# Patient Record
Sex: FEMALE | Race: WHITE | Employment: UNEMPLOYED | URBAN - METROPOLITAN AREA
[De-identification: names, ages, dates, MRNs, and addresses within clinical notes are randomized per-mention and may not be internally consistent; named-entity substitution may affect disease eponyms.]

---

## 2020-01-01 ENCOUNTER — APPOINTMENT (OUTPATIENT)
Dept: GENERAL RADIOLOGY | Age: 70
DRG: 480 | End: 2020-01-01
Attending: ORTHOPAEDIC SURGERY
Payer: MEDICARE

## 2020-01-01 ENCOUNTER — APPOINTMENT (OUTPATIENT)
Dept: GENERAL RADIOLOGY | Age: 70
DRG: 480 | End: 2020-01-01
Attending: INTERNAL MEDICINE
Payer: MEDICARE

## 2020-01-01 ENCOUNTER — APPOINTMENT (OUTPATIENT)
Dept: GENERAL RADIOLOGY | Age: 70
DRG: 480 | End: 2020-01-01
Attending: EMERGENCY MEDICINE
Payer: MEDICARE

## 2020-01-01 ENCOUNTER — APPOINTMENT (OUTPATIENT)
Dept: MRI IMAGING | Age: 70
DRG: 480 | End: 2020-01-01
Attending: INTERNAL MEDICINE
Payer: MEDICARE

## 2020-01-01 ENCOUNTER — ANESTHESIA EVENT (OUTPATIENT)
Dept: SURGERY | Age: 70
DRG: 480 | End: 2020-01-01
Payer: MEDICARE

## 2020-01-01 ENCOUNTER — APPOINTMENT (OUTPATIENT)
Dept: NON INVASIVE DIAGNOSTICS | Age: 70
DRG: 480 | End: 2020-01-01
Attending: HOSPITALIST
Payer: MEDICARE

## 2020-01-01 ENCOUNTER — APPOINTMENT (OUTPATIENT)
Dept: ULTRASOUND IMAGING | Age: 70
DRG: 480 | End: 2020-01-01
Attending: NURSE PRACTITIONER
Payer: MEDICARE

## 2020-01-01 ENCOUNTER — APPOINTMENT (OUTPATIENT)
Dept: ULTRASOUND IMAGING | Age: 70
DRG: 480 | End: 2020-01-01
Attending: INTERNAL MEDICINE
Payer: MEDICARE

## 2020-01-01 ENCOUNTER — HOSPITAL ENCOUNTER (INPATIENT)
Age: 70
LOS: 15 days | DRG: 480 | End: 2020-05-18
Attending: EMERGENCY MEDICINE | Admitting: HOSPITALIST
Payer: MEDICARE

## 2020-01-01 ENCOUNTER — ANESTHESIA (OUTPATIENT)
Dept: SURGERY | Age: 70
DRG: 480 | End: 2020-01-01
Payer: MEDICARE

## 2020-01-01 ENCOUNTER — APPOINTMENT (OUTPATIENT)
Dept: CT IMAGING | Age: 70
DRG: 480 | End: 2020-01-01
Attending: EMERGENCY MEDICINE
Payer: MEDICARE

## 2020-01-01 VITALS
TEMPERATURE: 97 F | WEIGHT: 149.4 LBS | DIASTOLIC BLOOD PRESSURE: 36 MMHG | HEART RATE: 56 BPM | RESPIRATION RATE: 16 BRPM | SYSTOLIC BLOOD PRESSURE: 55 MMHG | OXYGEN SATURATION: 96 % | BODY MASS INDEX: 26.47 KG/M2 | HEIGHT: 63 IN

## 2020-01-01 DIAGNOSIS — S72.001A CLOSED FRACTURE OF RIGHT HIP, INITIAL ENCOUNTER (HCC): Primary | ICD-10-CM

## 2020-01-01 DIAGNOSIS — R50.9 FEVER, UNSPECIFIED FEVER CAUSE: ICD-10-CM

## 2020-01-01 LAB
ABO + RH BLD: NORMAL
ACTIN IGG SERPL-ACNC: 6 UNITS (ref 0–19)
ALBUMIN SERPL-MCNC: 2.5 G/DL (ref 3.5–5)
ALBUMIN SERPL-MCNC: 2.6 G/DL (ref 3.5–5)
ALBUMIN SERPL-MCNC: 2.7 G/DL (ref 3.5–5)
ALBUMIN SERPL-MCNC: 2.7 G/DL (ref 3.5–5)
ALBUMIN SERPL-MCNC: 2.8 G/DL (ref 3.5–5)
ALBUMIN SERPL-MCNC: 2.9 G/DL (ref 3.5–5)
ALBUMIN SERPL-MCNC: 3 G/DL (ref 3.5–5)
ALBUMIN SERPL-MCNC: 3.4 G/DL (ref 3.5–5)
ALBUMIN SERPL-MCNC: 3.4 G/DL (ref 3.5–5)
ALBUMIN/GLOB SERPL: 0.7 {RATIO} (ref 1.1–2.2)
ALBUMIN/GLOB SERPL: 0.8 {RATIO} (ref 1.1–2.2)
ALBUMIN/GLOB SERPL: 0.9 {RATIO} (ref 1.1–2.2)
ALBUMIN/GLOB SERPL: 0.9 {RATIO} (ref 1.1–2.2)
ALP BONE CFR SERPL: 42 % (ref 14–68)
ALP BONE SERPL-MCNC: 114.9 UG/L
ALP INTEST CFR SERPL: 2 % (ref 0–18)
ALP LIVER CFR SERPL: 56 % (ref 18–85)
ALP SERPL-CCNC: 113 U/L (ref 45–117)
ALP SERPL-CCNC: 135 U/L (ref 45–117)
ALP SERPL-CCNC: 179 U/L (ref 45–117)
ALP SERPL-CCNC: 460 IU/L (ref 39–117)
ALP SERPL-CCNC: 466 U/L (ref 45–117)
ALP SERPL-CCNC: 480 U/L (ref 45–117)
ALP SERPL-CCNC: 508 U/L (ref 45–117)
ALP SERPL-CCNC: 569 U/L (ref 45–117)
ALP SERPL-CCNC: 620 U/L (ref 45–117)
ALP SERPL-CCNC: 622 U/L (ref 45–117)
ALT SERPL-CCNC: 104 U/L (ref 12–78)
ALT SERPL-CCNC: 106 U/L (ref 12–78)
ALT SERPL-CCNC: 111 U/L (ref 12–78)
ALT SERPL-CCNC: 131 U/L (ref 12–78)
ALT SERPL-CCNC: 28 U/L (ref 12–78)
ALT SERPL-CCNC: 36 U/L (ref 12–78)
ALT SERPL-CCNC: 37 U/L (ref 12–78)
ALT SERPL-CCNC: 74 U/L (ref 12–78)
ALT SERPL-CCNC: 78 U/L (ref 12–78)
ANION GAP SERPL CALC-SCNC: 11 MMOL/L (ref 5–15)
ANION GAP SERPL CALC-SCNC: 12 MMOL/L (ref 5–15)
ANION GAP SERPL CALC-SCNC: 4 MMOL/L (ref 5–15)
ANION GAP SERPL CALC-SCNC: 5 MMOL/L (ref 5–15)
ANION GAP SERPL CALC-SCNC: 6 MMOL/L (ref 5–15)
ANION GAP SERPL CALC-SCNC: 7 MMOL/L (ref 5–15)
ANION GAP SERPL CALC-SCNC: 7 MMOL/L (ref 5–15)
ANION GAP SERPL CALC-SCNC: 8 MMOL/L (ref 5–15)
ANION GAP SERPL CALC-SCNC: 9 MMOL/L (ref 5–15)
APPEARANCE UR: ABNORMAL
AST SERPL-CCNC: 17 U/L (ref 15–37)
AST SERPL-CCNC: 17 U/L (ref 15–37)
AST SERPL-CCNC: 20 U/L (ref 15–37)
AST SERPL-CCNC: 33 U/L (ref 15–37)
AST SERPL-CCNC: 38 U/L (ref 15–37)
AST SERPL-CCNC: 43 U/L (ref 15–37)
AST SERPL-CCNC: 61 U/L (ref 15–37)
AST SERPL-CCNC: 63 U/L (ref 15–37)
AST SERPL-CCNC: 71 U/L (ref 15–37)
ATRIAL RATE: 55 BPM
ATRIAL RATE: 91 BPM
AV VELOCITY RATIO: 0.7
B PERT DNA SPEC QL NAA+PROBE: NOT DETECTED
BACTERIA SPEC CULT: ABNORMAL
BACTERIA SPEC CULT: NORMAL
BACTERIA URNS QL MICRO: ABNORMAL /HPF
BASOPHILS # BLD: 0 K/UL (ref 0–0.1)
BASOPHILS # BLD: 0.1 K/UL (ref 0–0.1)
BASOPHILS NFR BLD: 0 % (ref 0–1)
BASOPHILS NFR BLD: 1 % (ref 0–1)
BILIRUB SERPL-MCNC: 0.3 MG/DL (ref 0.2–1)
BILIRUB SERPL-MCNC: 0.4 MG/DL (ref 0.2–1)
BILIRUB SERPL-MCNC: 0.4 MG/DL (ref 0.2–1)
BILIRUB SERPL-MCNC: 0.5 MG/DL (ref 0.2–1)
BILIRUB SERPL-MCNC: 0.7 MG/DL (ref 0.2–1)
BILIRUB SERPL-MCNC: 0.7 MG/DL (ref 0.2–1)
BILIRUB SERPL-MCNC: 0.8 MG/DL (ref 0.2–1)
BILIRUB UR QL: NEGATIVE
BLOOD GROUP ANTIBODIES SERPL: NORMAL
BNP SERPL-MCNC: 4772 PG/ML
BNP SERPL-MCNC: 5422 PG/ML
BORDETELLA PARAPERTUSSIS PCR, BORPAR: NOT DETECTED
BUN SERPL-MCNC: 13 MG/DL (ref 6–20)
BUN SERPL-MCNC: 15 MG/DL (ref 6–20)
BUN SERPL-MCNC: 16 MG/DL (ref 6–20)
BUN SERPL-MCNC: 17 MG/DL (ref 6–20)
BUN SERPL-MCNC: 22 MG/DL (ref 6–20)
BUN SERPL-MCNC: 25 MG/DL (ref 6–20)
BUN SERPL-MCNC: 26 MG/DL (ref 6–20)
BUN SERPL-MCNC: 27 MG/DL (ref 6–20)
BUN SERPL-MCNC: 27 MG/DL (ref 6–20)
BUN SERPL-MCNC: 28 MG/DL (ref 6–20)
BUN SERPL-MCNC: 29 MG/DL (ref 6–20)
BUN/CREAT SERPL: 16 (ref 12–20)
BUN/CREAT SERPL: 19 (ref 12–20)
BUN/CREAT SERPL: 20 (ref 12–20)
BUN/CREAT SERPL: 25 (ref 12–20)
BUN/CREAT SERPL: 27 (ref 12–20)
BUN/CREAT SERPL: 29 (ref 12–20)
BUN/CREAT SERPL: 33 (ref 12–20)
BUN/CREAT SERPL: 35 (ref 12–20)
BUN/CREAT SERPL: 36 (ref 12–20)
BUN/CREAT SERPL: 37 (ref 12–20)
BUN/CREAT SERPL: 41 (ref 12–20)
C PNEUM DNA SPEC QL NAA+PROBE: NOT DETECTED
CALCIUM SERPL-MCNC: 8.3 MG/DL (ref 8.5–10.1)
CALCIUM SERPL-MCNC: 8.6 MG/DL (ref 8.5–10.1)
CALCIUM SERPL-MCNC: 8.7 MG/DL (ref 8.5–10.1)
CALCIUM SERPL-MCNC: 8.7 MG/DL (ref 8.5–10.1)
CALCIUM SERPL-MCNC: 8.8 MG/DL (ref 8.5–10.1)
CALCIUM SERPL-MCNC: 8.9 MG/DL (ref 8.5–10.1)
CALCIUM SERPL-MCNC: 9 MG/DL (ref 8.5–10.1)
CALCIUM SERPL-MCNC: 9.1 MG/DL (ref 8.5–10.1)
CALCIUM SERPL-MCNC: 9.1 MG/DL (ref 8.5–10.1)
CALCIUM SERPL-MCNC: 9.2 MG/DL (ref 8.5–10.1)
CALCIUM SERPL-MCNC: 9.6 MG/DL (ref 8.5–10.1)
CALCULATED P AXIS, ECG09: 73 DEGREES
CALCULATED R AXIS, ECG10: -26 DEGREES
CALCULATED R AXIS, ECG10: -52 DEGREES
CALCULATED T AXIS, ECG11: 114 DEGREES
CALCULATED T AXIS, ECG11: 87 DEGREES
CC UR VC: ABNORMAL
CHLORIDE SERPL-SCNC: 106 MMOL/L (ref 97–108)
CHLORIDE SERPL-SCNC: 107 MMOL/L (ref 97–108)
CHLORIDE SERPL-SCNC: 108 MMOL/L (ref 97–108)
CHLORIDE SERPL-SCNC: 108 MMOL/L (ref 97–108)
CHLORIDE SERPL-SCNC: 110 MMOL/L (ref 97–108)
CHLORIDE SERPL-SCNC: 110 MMOL/L (ref 97–108)
CHLORIDE SERPL-SCNC: 111 MMOL/L (ref 97–108)
CHLORIDE SERPL-SCNC: 113 MMOL/L (ref 97–108)
CHLORIDE SERPL-SCNC: 114 MMOL/L (ref 97–108)
CHLORIDE SERPL-SCNC: 115 MMOL/L (ref 97–108)
CHOLEST SERPL-MCNC: 160 MG/DL
CK MB CFR SERPL CALC: 1.7 % (ref 0–2.5)
CK MB SERPL-MCNC: 3.2 NG/ML (ref 5–25)
CK SERPL-CCNC: 186 U/L (ref 26–192)
CO2 SERPL-SCNC: 18 MMOL/L (ref 21–32)
CO2 SERPL-SCNC: 18 MMOL/L (ref 21–32)
CO2 SERPL-SCNC: 23 MMOL/L (ref 21–32)
CO2 SERPL-SCNC: 24 MMOL/L (ref 21–32)
CO2 SERPL-SCNC: 25 MMOL/L (ref 21–32)
CO2 SERPL-SCNC: 26 MMOL/L (ref 21–32)
CO2 SERPL-SCNC: 28 MMOL/L (ref 21–32)
COLOR UR: ABNORMAL
COMMENT, HOLDF: NORMAL
CORTIS SERPL-MCNC: 27.8 UG/DL
CORTIS SERPL-MCNC: 29.1 UG/DL
CREAT SERPL-MCNC: 0.61 MG/DL (ref 0.55–1.02)
CREAT SERPL-MCNC: 0.63 MG/DL (ref 0.55–1.02)
CREAT SERPL-MCNC: 0.67 MG/DL (ref 0.55–1.02)
CREAT SERPL-MCNC: 0.71 MG/DL (ref 0.55–1.02)
CREAT SERPL-MCNC: 0.75 MG/DL (ref 0.55–1.02)
CREAT SERPL-MCNC: 0.75 MG/DL (ref 0.55–1.02)
CREAT SERPL-MCNC: 0.76 MG/DL (ref 0.55–1.02)
CREAT SERPL-MCNC: 0.77 MG/DL (ref 0.55–1.02)
CREAT SERPL-MCNC: 0.8 MG/DL (ref 0.55–1.02)
CREAT SERPL-MCNC: 0.82 MG/DL (ref 0.55–1.02)
CREAT SERPL-MCNC: 0.83 MG/DL (ref 0.55–1.02)
CREAT SERPL-MCNC: 0.86 MG/DL (ref 0.55–1.02)
CREAT SERPL-MCNC: 0.93 MG/DL (ref 0.55–1.02)
CRP SERPL-MCNC: 5.49 MG/DL (ref 0–0.6)
D DIMER PPP FEU-MCNC: 30.85 MG/L FEU (ref 0–0.65)
D DIMER PPP FEU-MCNC: 9.08 MG/L FEU (ref 0–0.65)
DIAGNOSIS, 93000: NORMAL
DIAGNOSIS, 93000: NORMAL
DIFFERENTIAL METHOD BLD: ABNORMAL
ECHO AO ROOT DIAM: 2.93 CM
ECHO AV AREA PEAK VELOCITY: 2.3 CM2
ECHO AV AREA/BSA PEAK VELOCITY: 1.4 CM2/M2
ECHO AV PEAK GRADIENT: 7.3 MMHG
ECHO AV PEAK VELOCITY: 134.93 CM/S
ECHO LA AREA 4C: 25.9 CM2
ECHO LA MAJOR AXIS: 3.49 CM
ECHO LA TO AORTIC ROOT RATIO: 1.19
ECHO LA VOL 2C: 85.15 ML (ref 22–52)
ECHO LA VOL 4C: 78.48 ML (ref 22–52)
ECHO LA VOL BP: 91.85 ML (ref 22–52)
ECHO LA VOL/BSA BIPLANE: 56.19 ML/M2 (ref 16–28)
ECHO LA VOLUME INDEX A2C: 52.09 ML/M2 (ref 16–28)
ECHO LA VOLUME INDEX A4C: 48.01 ML/M2 (ref 16–28)
ECHO LV E' LATERAL VELOCITY: 8.54 CM/S
ECHO LV E' SEPTAL VELOCITY: 7.86 CM/S
ECHO LV EDV TEICHHOLZ: 0.37 ML
ECHO LV ESV TEICHHOLZ: 0.12 ML
ECHO LV INTERNAL DIMENSION DIASTOLIC: 3.72 CM (ref 3.9–5.3)
ECHO LV INTERNAL DIMENSION SYSTOLIC: 2.39 CM
ECHO LV IVSD: 1.46 CM (ref 0.6–0.9)
ECHO LV IVSS: 2 CM
ECHO LV MASS 2D: 269.2 G (ref 67–162)
ECHO LV MASS INDEX 2D: 164.7 G/M2 (ref 43–95)
ECHO LV POSTERIOR WALL DIASTOLIC: 1.69 CM (ref 0.6–0.9)
ECHO LV POSTERIOR WALL SYSTOLIC: 2.01 CM
ECHO LVOT DIAM: 2.06 CM
ECHO LVOT PEAK GRADIENT: 3.5 MMHG
ECHO LVOT PEAK VELOCITY: 93.92 CM/S
ECHO MV AREA PHT: 4.3 CM2
ECHO MV E DECELERATION TIME (DT): 206.6 MS
ECHO MV E VELOCITY: 157.87 CM/S
ECHO MV E/E' LATERAL: 18.49
ECHO MV E/E' RATIO (AVERAGED): 19.29
ECHO MV E/E' SEPTAL: 20.09
ECHO MV PRESSURE HALF TIME (PHT): 50.9 MS
ECHO MV REGURGITANT PEAK GRADIENT: 138.7 MMHG
ECHO MV REGURGITANT PEAK VELOCITY: 588.9 CM/S
ECHO PV MAX VELOCITY: 127.76 CM/S
ECHO PV PEAK GRADIENT: 6.5 MMHG
ECHO RV INTERNAL DIMENSION: 4.38 CM
ECHO TV REGURGITANT MAX VELOCITY: 234.96 CM/S
ECHO TV REGURGITANT PEAK GRADIENT: 22.1 MMHG
EOSINOPHIL # BLD: 0 K/UL (ref 0–0.4)
EOSINOPHIL # BLD: 0.1 K/UL (ref 0–0.4)
EOSINOPHIL # BLD: 0.1 K/UL (ref 0–0.4)
EOSINOPHIL # BLD: 0.2 K/UL (ref 0–0.4)
EOSINOPHIL NFR BLD: 0 % (ref 0–7)
EOSINOPHIL NFR BLD: 1 % (ref 0–7)
EPITH CASTS URNS QL MICRO: ABNORMAL /LPF
ERYTHROCYTE [DISTWIDTH] IN BLOOD BY AUTOMATED COUNT: 14.4 % (ref 11.5–14.5)
ERYTHROCYTE [DISTWIDTH] IN BLOOD BY AUTOMATED COUNT: 14.6 % (ref 11.5–14.5)
ERYTHROCYTE [DISTWIDTH] IN BLOOD BY AUTOMATED COUNT: 15.3 % (ref 11.5–14.5)
ERYTHROCYTE [DISTWIDTH] IN BLOOD BY AUTOMATED COUNT: 16.2 % (ref 11.5–14.5)
ERYTHROCYTE [DISTWIDTH] IN BLOOD BY AUTOMATED COUNT: 16.5 % (ref 11.5–14.5)
ERYTHROCYTE [DISTWIDTH] IN BLOOD BY AUTOMATED COUNT: 17 % (ref 11.5–14.5)
ERYTHROCYTE [DISTWIDTH] IN BLOOD BY AUTOMATED COUNT: 17.3 % (ref 11.5–14.5)
ERYTHROCYTE [DISTWIDTH] IN BLOOD BY AUTOMATED COUNT: 17.3 % (ref 11.5–14.5)
ERYTHROCYTE [DISTWIDTH] IN BLOOD BY AUTOMATED COUNT: 17.5 % (ref 11.5–14.5)
EST. AVERAGE GLUCOSE BLD GHB EST-MCNC: 212 MG/DL
FERRITIN SERPL-MCNC: 115 NG/ML (ref 26–388)
FERRITIN SERPL-MCNC: 141 NG/ML (ref 8–252)
FLUAV H1 2009 PAND RNA SPEC QL NAA+PROBE: NOT DETECTED
FLUAV H1 RNA SPEC QL NAA+PROBE: NOT DETECTED
FLUAV H3 RNA SPEC QL NAA+PROBE: NOT DETECTED
FLUAV SUBTYP SPEC NAA+PROBE: NOT DETECTED
FLUBV RNA SPEC QL NAA+PROBE: NOT DETECTED
FLUID CULTURE, SPNG2: NORMAL
GGT SERPL-CCNC: 412 U/L (ref 5–55)
GLOBULIN SER CALC-MCNC: 3.4 G/DL (ref 2–4)
GLOBULIN SER CALC-MCNC: 3.4 G/DL (ref 2–4)
GLOBULIN SER CALC-MCNC: 3.6 G/DL (ref 2–4)
GLOBULIN SER CALC-MCNC: 3.6 G/DL (ref 2–4)
GLOBULIN SER CALC-MCNC: 3.8 G/DL (ref 2–4)
GLOBULIN SER CALC-MCNC: 3.9 G/DL (ref 2–4)
GLOBULIN SER CALC-MCNC: 4.3 G/DL (ref 2–4)
GLUCOSE BLD STRIP.AUTO-MCNC: 111 MG/DL (ref 65–100)
GLUCOSE BLD STRIP.AUTO-MCNC: 113 MG/DL (ref 65–100)
GLUCOSE BLD STRIP.AUTO-MCNC: 116 MG/DL (ref 65–100)
GLUCOSE BLD STRIP.AUTO-MCNC: 119 MG/DL (ref 65–100)
GLUCOSE BLD STRIP.AUTO-MCNC: 121 MG/DL (ref 65–100)
GLUCOSE BLD STRIP.AUTO-MCNC: 125 MG/DL (ref 65–100)
GLUCOSE BLD STRIP.AUTO-MCNC: 125 MG/DL (ref 65–100)
GLUCOSE BLD STRIP.AUTO-MCNC: 126 MG/DL (ref 65–100)
GLUCOSE BLD STRIP.AUTO-MCNC: 133 MG/DL (ref 65–100)
GLUCOSE BLD STRIP.AUTO-MCNC: 133 MG/DL (ref 65–100)
GLUCOSE BLD STRIP.AUTO-MCNC: 134 MG/DL (ref 65–100)
GLUCOSE BLD STRIP.AUTO-MCNC: 143 MG/DL (ref 65–100)
GLUCOSE BLD STRIP.AUTO-MCNC: 148 MG/DL (ref 65–100)
GLUCOSE BLD STRIP.AUTO-MCNC: 148 MG/DL (ref 65–100)
GLUCOSE BLD STRIP.AUTO-MCNC: 175 MG/DL (ref 65–100)
GLUCOSE BLD STRIP.AUTO-MCNC: 180 MG/DL (ref 65–100)
GLUCOSE BLD STRIP.AUTO-MCNC: 187 MG/DL (ref 65–100)
GLUCOSE BLD STRIP.AUTO-MCNC: 191 MG/DL (ref 65–100)
GLUCOSE BLD STRIP.AUTO-MCNC: 199 MG/DL (ref 65–100)
GLUCOSE BLD STRIP.AUTO-MCNC: 201 MG/DL (ref 65–100)
GLUCOSE BLD STRIP.AUTO-MCNC: 202 MG/DL (ref 65–100)
GLUCOSE BLD STRIP.AUTO-MCNC: 208 MG/DL (ref 65–100)
GLUCOSE BLD STRIP.AUTO-MCNC: 209 MG/DL (ref 65–100)
GLUCOSE BLD STRIP.AUTO-MCNC: 210 MG/DL (ref 65–100)
GLUCOSE BLD STRIP.AUTO-MCNC: 211 MG/DL (ref 65–100)
GLUCOSE BLD STRIP.AUTO-MCNC: 212 MG/DL (ref 65–100)
GLUCOSE BLD STRIP.AUTO-MCNC: 214 MG/DL (ref 65–100)
GLUCOSE BLD STRIP.AUTO-MCNC: 214 MG/DL (ref 65–100)
GLUCOSE BLD STRIP.AUTO-MCNC: 216 MG/DL (ref 65–100)
GLUCOSE BLD STRIP.AUTO-MCNC: 217 MG/DL (ref 65–100)
GLUCOSE BLD STRIP.AUTO-MCNC: 223 MG/DL (ref 65–100)
GLUCOSE BLD STRIP.AUTO-MCNC: 224 MG/DL (ref 65–100)
GLUCOSE BLD STRIP.AUTO-MCNC: 227 MG/DL (ref 65–100)
GLUCOSE BLD STRIP.AUTO-MCNC: 229 MG/DL (ref 65–100)
GLUCOSE BLD STRIP.AUTO-MCNC: 229 MG/DL (ref 65–100)
GLUCOSE BLD STRIP.AUTO-MCNC: 230 MG/DL (ref 65–100)
GLUCOSE BLD STRIP.AUTO-MCNC: 230 MG/DL (ref 65–100)
GLUCOSE BLD STRIP.AUTO-MCNC: 233 MG/DL (ref 65–100)
GLUCOSE BLD STRIP.AUTO-MCNC: 235 MG/DL (ref 65–100)
GLUCOSE BLD STRIP.AUTO-MCNC: 239 MG/DL (ref 65–100)
GLUCOSE BLD STRIP.AUTO-MCNC: 241 MG/DL (ref 65–100)
GLUCOSE BLD STRIP.AUTO-MCNC: 244 MG/DL (ref 65–100)
GLUCOSE BLD STRIP.AUTO-MCNC: 248 MG/DL (ref 65–100)
GLUCOSE BLD STRIP.AUTO-MCNC: 250 MG/DL (ref 65–100)
GLUCOSE BLD STRIP.AUTO-MCNC: 253 MG/DL (ref 65–100)
GLUCOSE BLD STRIP.AUTO-MCNC: 260 MG/DL (ref 65–100)
GLUCOSE BLD STRIP.AUTO-MCNC: 264 MG/DL (ref 65–100)
GLUCOSE BLD STRIP.AUTO-MCNC: 266 MG/DL (ref 65–100)
GLUCOSE BLD STRIP.AUTO-MCNC: 267 MG/DL (ref 65–100)
GLUCOSE BLD STRIP.AUTO-MCNC: 269 MG/DL (ref 65–100)
GLUCOSE BLD STRIP.AUTO-MCNC: 271 MG/DL (ref 65–100)
GLUCOSE BLD STRIP.AUTO-MCNC: 273 MG/DL (ref 65–100)
GLUCOSE BLD STRIP.AUTO-MCNC: 276 MG/DL (ref 65–100)
GLUCOSE BLD STRIP.AUTO-MCNC: 285 MG/DL (ref 65–100)
GLUCOSE BLD STRIP.AUTO-MCNC: 288 MG/DL (ref 65–100)
GLUCOSE BLD STRIP.AUTO-MCNC: 288 MG/DL (ref 65–100)
GLUCOSE BLD STRIP.AUTO-MCNC: 300 MG/DL (ref 65–100)
GLUCOSE BLD STRIP.AUTO-MCNC: 306 MG/DL (ref 65–100)
GLUCOSE BLD STRIP.AUTO-MCNC: 311 MG/DL (ref 65–100)
GLUCOSE BLD STRIP.AUTO-MCNC: 322 MG/DL (ref 65–100)
GLUCOSE BLD STRIP.AUTO-MCNC: 327 MG/DL (ref 65–100)
GLUCOSE BLD STRIP.AUTO-MCNC: 363 MG/DL (ref 65–100)
GLUCOSE BLD STRIP.AUTO-MCNC: 368 MG/DL (ref 65–100)
GLUCOSE BLD STRIP.AUTO-MCNC: 385 MG/DL (ref 65–100)
GLUCOSE BLD STRIP.AUTO-MCNC: 395 MG/DL (ref 65–100)
GLUCOSE BLD STRIP.AUTO-MCNC: 59 MG/DL (ref 65–100)
GLUCOSE BLD STRIP.AUTO-MCNC: 64 MG/DL (ref 65–100)
GLUCOSE BLD STRIP.AUTO-MCNC: 84 MG/DL (ref 65–100)
GLUCOSE SERPL-MCNC: 100 MG/DL (ref 65–100)
GLUCOSE SERPL-MCNC: 121 MG/DL (ref 65–100)
GLUCOSE SERPL-MCNC: 126 MG/DL (ref 65–100)
GLUCOSE SERPL-MCNC: 200 MG/DL (ref 65–100)
GLUCOSE SERPL-MCNC: 204 MG/DL (ref 65–100)
GLUCOSE SERPL-MCNC: 217 MG/DL (ref 65–100)
GLUCOSE SERPL-MCNC: 220 MG/DL (ref 65–100)
GLUCOSE SERPL-MCNC: 226 MG/DL (ref 65–100)
GLUCOSE SERPL-MCNC: 237 MG/DL (ref 65–100)
GLUCOSE SERPL-MCNC: 241 MG/DL (ref 65–100)
GLUCOSE SERPL-MCNC: 52 MG/DL (ref 65–100)
GLUCOSE SERPL-MCNC: 66 MG/DL (ref 65–100)
GLUCOSE SERPL-MCNC: 92 MG/DL (ref 65–100)
GLUCOSE UR STRIP.AUTO-MCNC: 100 MG/DL
HADV DNA SPEC QL NAA+PROBE: NOT DETECTED
HAV IGM SER QL: NONREACTIVE
HBA1C MFR BLD: 9 % (ref 4–5.6)
HBV CORE IGM SER QL: NONREACTIVE
HBV SURFACE AG SER QL: <0.1 INDEX
HBV SURFACE AG SER QL: NEGATIVE
HCOV 229E RNA SPEC QL NAA+PROBE: NOT DETECTED
HCOV HKU1 RNA SPEC QL NAA+PROBE: NOT DETECTED
HCOV NL63 RNA SPEC QL NAA+PROBE: NOT DETECTED
HCOV OC43 RNA SPEC QL NAA+PROBE: NOT DETECTED
HCT VFR BLD AUTO: 24.5 % (ref 35–47)
HCT VFR BLD AUTO: 24.7 % (ref 35–47)
HCT VFR BLD AUTO: 26.1 % (ref 35–47)
HCT VFR BLD AUTO: 26.4 % (ref 35–47)
HCT VFR BLD AUTO: 26.7 % (ref 35–47)
HCT VFR BLD AUTO: 27 % (ref 35–47)
HCT VFR BLD AUTO: 27.2 % (ref 35–47)
HCT VFR BLD AUTO: 28.9 % (ref 35–47)
HCT VFR BLD AUTO: 29.4 % (ref 35–47)
HCV AB SERPL QL IA: NONREACTIVE
HCV COMMENT,HCGAC: NORMAL
HDLC SERPL-MCNC: 54 MG/DL
HDLC SERPL: 3 {RATIO} (ref 0–5)
HGB BLD-MCNC: 7.7 G/DL (ref 11.5–16)
HGB BLD-MCNC: 7.9 G/DL (ref 11.5–16)
HGB BLD-MCNC: 8 G/DL (ref 11.5–16)
HGB BLD-MCNC: 8.7 G/DL (ref 11.5–16)
HGB BLD-MCNC: 8.8 G/DL (ref 11.5–16)
HGB BLD-MCNC: 8.8 G/DL (ref 11.5–16)
HGB BLD-MCNC: 9.2 G/DL (ref 11.5–16)
HGB BLD-MCNC: 9.8 G/DL (ref 11.5–16)
HGB UR QL STRIP: ABNORMAL
HMPV RNA SPEC QL NAA+PROBE: NOT DETECTED
HPIV1 RNA SPEC QL NAA+PROBE: NOT DETECTED
HPIV2 RNA SPEC QL NAA+PROBE: NOT DETECTED
HPIV3 RNA SPEC QL NAA+PROBE: NOT DETECTED
HPIV4 RNA SPEC QL NAA+PROBE: NOT DETECTED
HYALINE CASTS URNS QL MICRO: ABNORMAL /LPF (ref 0–5)
IL6 SERPL-MCNC: 25.7 PG/ML (ref 0–15.5)
IMM GRANULOCYTES # BLD AUTO: 0 K/UL (ref 0–0.04)
IMM GRANULOCYTES # BLD AUTO: 0 K/UL (ref 0–0.04)
IMM GRANULOCYTES # BLD AUTO: 0.1 K/UL (ref 0–0.04)
IMM GRANULOCYTES # BLD AUTO: 0.2 K/UL (ref 0–0.04)
IMM GRANULOCYTES NFR BLD AUTO: 0 % (ref 0–0.5)
IMM GRANULOCYTES NFR BLD AUTO: 0 % (ref 0–0.5)
IMM GRANULOCYTES NFR BLD AUTO: 1 % (ref 0–0.5)
INR PPP: 1 (ref 0.9–1.1)
INR PPP: 1.1 (ref 0.9–1.1)
IRON SATN MFR SERPL: 21 % (ref 20–50)
IRON SERPL-MCNC: 55 UG/DL (ref 35–150)
KETONES UR QL STRIP.AUTO: NEGATIVE MG/DL
L PNEUMO1 AG UR QL IA: NEGATIVE
LACTATE SERPL-SCNC: 1.2 MMOL/L (ref 0.4–2)
LACTATE SERPL-SCNC: 1.6 MMOL/L (ref 0.4–2)
LACTATE SERPL-SCNC: 1.7 MMOL/L (ref 0.4–2)
LACTATE SERPL-SCNC: 2.6 MMOL/L (ref 0.4–2)
LACTATE SERPL-SCNC: 4.2 MMOL/L (ref 0.4–2)
LDH SERPL L TO P-CCNC: 320 U/L (ref 81–246)
LDH SERPL L TO P-CCNC: 425 U/L (ref 81–246)
LDLC SERPL CALC-MCNC: 90.4 MG/DL (ref 0–100)
LEUKOCYTE ESTERASE UR QL STRIP.AUTO: NEGATIVE
LIPID PROFILE,FLP: NORMAL
LVFS 2D: 35.79 %
LVSV (TEICH): 23.73 ML
LYMPHOCYTES # BLD: 0.8 K/UL (ref 0.8–3.5)
LYMPHOCYTES # BLD: 1 K/UL (ref 0.8–3.5)
LYMPHOCYTES # BLD: 1.5 K/UL (ref 0.8–3.5)
LYMPHOCYTES # BLD: 1.6 K/UL (ref 0.8–3.5)
LYMPHOCYTES # BLD: 1.8 K/UL (ref 0.8–3.5)
LYMPHOCYTES # BLD: 2.4 K/UL (ref 0.8–3.5)
LYMPHOCYTES # BLD: 3 K/UL (ref 0.8–3.5)
LYMPHOCYTES NFR BLD: 10 % (ref 12–49)
LYMPHOCYTES NFR BLD: 12 % (ref 12–49)
LYMPHOCYTES NFR BLD: 14 % (ref 12–49)
LYMPHOCYTES NFR BLD: 14 % (ref 12–49)
LYMPHOCYTES NFR BLD: 20 % (ref 12–49)
LYMPHOCYTES NFR BLD: 25 % (ref 12–49)
LYMPHOCYTES NFR BLD: 7 % (ref 12–49)
M PNEUMO DNA SPEC QL NAA+PROBE: NOT DETECTED
MAGNESIUM SERPL-MCNC: 1.6 MG/DL (ref 1.6–2.4)
MAGNESIUM SERPL-MCNC: 2.1 MG/DL (ref 1.6–2.4)
MCH RBC QN AUTO: 28.6 PG (ref 26–34)
MCH RBC QN AUTO: 29 PG (ref 26–34)
MCH RBC QN AUTO: 29.3 PG (ref 26–34)
MCH RBC QN AUTO: 29.3 PG (ref 26–34)
MCH RBC QN AUTO: 29.4 PG (ref 26–34)
MCH RBC QN AUTO: 29.6 PG (ref 26–34)
MCH RBC QN AUTO: 29.7 PG (ref 26–34)
MCH RBC QN AUTO: 29.7 PG (ref 26–34)
MCH RBC QN AUTO: 29.8 PG (ref 26–34)
MCHC RBC AUTO-ENTMCNC: 31.2 G/DL (ref 30–36.5)
MCHC RBC AUTO-ENTMCNC: 31.8 G/DL (ref 30–36.5)
MCHC RBC AUTO-ENTMCNC: 32.2 G/DL (ref 30–36.5)
MCHC RBC AUTO-ENTMCNC: 32.4 G/DL (ref 30–36.5)
MCHC RBC AUTO-ENTMCNC: 32.6 G/DL (ref 30–36.5)
MCHC RBC AUTO-ENTMCNC: 32.6 G/DL (ref 30–36.5)
MCHC RBC AUTO-ENTMCNC: 33 G/DL (ref 30–36.5)
MCHC RBC AUTO-ENTMCNC: 33.3 G/DL (ref 30–36.5)
MCHC RBC AUTO-ENTMCNC: 33.3 G/DL (ref 30–36.5)
MCV RBC AUTO: 88.2 FL (ref 80–99)
MCV RBC AUTO: 88.8 FL (ref 80–99)
MCV RBC AUTO: 89 FL (ref 80–99)
MCV RBC AUTO: 90.4 FL (ref 80–99)
MCV RBC AUTO: 90.7 FL (ref 80–99)
MCV RBC AUTO: 91.2 FL (ref 80–99)
MCV RBC AUTO: 91.8 FL (ref 80–99)
MCV RBC AUTO: 91.9 FL (ref 80–99)
MCV RBC AUTO: 92 FL (ref 80–99)
MONOCYTES # BLD: 0.4 K/UL (ref 0–1)
MONOCYTES # BLD: 0.6 K/UL (ref 0–1)
MONOCYTES # BLD: 0.9 K/UL (ref 0–1)
MONOCYTES # BLD: 0.9 K/UL (ref 0–1)
MONOCYTES # BLD: 1.1 K/UL (ref 0–1)
MONOCYTES # BLD: 1.2 K/UL (ref 0–1)
MONOCYTES # BLD: 1.6 K/UL (ref 0–1)
MONOCYTES NFR BLD: 10 % (ref 5–13)
MONOCYTES NFR BLD: 13 % (ref 5–13)
MONOCYTES NFR BLD: 4 % (ref 5–13)
MONOCYTES NFR BLD: 5 % (ref 5–13)
MONOCYTES NFR BLD: 7 % (ref 5–13)
MONOCYTES NFR BLD: 8 % (ref 5–13)
MONOCYTES NFR BLD: 8 % (ref 5–13)
MV DEC SLOPE: 7.64
NEUTS SEG # BLD: 10.5 K/UL (ref 1.8–8)
NEUTS SEG # BLD: 12.2 K/UL (ref 1.8–8)
NEUTS SEG # BLD: 7.7 K/UL (ref 1.8–8)
NEUTS SEG # BLD: 7.8 K/UL (ref 1.8–8)
NEUTS SEG # BLD: 8.1 K/UL (ref 1.8–8)
NEUTS SEG # BLD: 8.2 K/UL (ref 1.8–8)
NEUTS SEG # BLD: 8.8 K/UL (ref 1.8–8)
NEUTS SEG NFR BLD: 64 % (ref 32–75)
NEUTS SEG NFR BLD: 65 % (ref 32–75)
NEUTS SEG NFR BLD: 76 % (ref 32–75)
NEUTS SEG NFR BLD: 77 % (ref 32–75)
NEUTS SEG NFR BLD: 80 % (ref 32–75)
NEUTS SEG NFR BLD: 85 % (ref 32–75)
NEUTS SEG NFR BLD: 88 % (ref 32–75)
NITRITE UR QL STRIP.AUTO: NEGATIVE
NRBC # BLD: 0 K/UL (ref 0–0.01)
NRBC BLD-RTO: 0 PER 100 WBC
ORGANISM ID, SPNG3: NORMAL
P-R INTERVAL, ECG05: 240 MS
PH UR STRIP: 7 [PH] (ref 5–8)
PLATELET # BLD AUTO: 386 K/UL (ref 150–400)
PLATELET # BLD AUTO: 424 K/UL (ref 150–400)
PLATELET # BLD AUTO: 434 K/UL (ref 150–400)
PLATELET # BLD AUTO: 542 K/UL (ref 150–400)
PLATELET # BLD AUTO: 678 K/UL (ref 150–400)
PLATELET # BLD AUTO: 690 K/UL (ref 150–400)
PLATELET # BLD AUTO: 699 K/UL (ref 150–400)
PLATELET # BLD AUTO: 730 K/UL (ref 150–400)
PLATELET # BLD AUTO: 739 K/UL (ref 150–400)
PLATELET COMMENTS,PCOM: ABNORMAL
PLEASE NOTE, SPNG4: NORMAL
PMV BLD AUTO: 10.2 FL (ref 8.9–12.9)
PMV BLD AUTO: 10.4 FL (ref 8.9–12.9)
PMV BLD AUTO: 10.6 FL (ref 8.9–12.9)
PMV BLD AUTO: 10.6 FL (ref 8.9–12.9)
PMV BLD AUTO: 10.8 FL (ref 8.9–12.9)
PMV BLD AUTO: 10.9 FL (ref 8.9–12.9)
PMV BLD AUTO: 11 FL (ref 8.9–12.9)
PMV BLD AUTO: 11.1 FL (ref 8.9–12.9)
PMV BLD AUTO: 11.6 FL (ref 8.9–12.9)
POTASSIUM SERPL-SCNC: 3.3 MMOL/L (ref 3.5–5.1)
POTASSIUM SERPL-SCNC: 3.3 MMOL/L (ref 3.5–5.1)
POTASSIUM SERPL-SCNC: 3.4 MMOL/L (ref 3.5–5.1)
POTASSIUM SERPL-SCNC: 3.5 MMOL/L (ref 3.5–5.1)
POTASSIUM SERPL-SCNC: 3.6 MMOL/L (ref 3.5–5.1)
POTASSIUM SERPL-SCNC: 3.6 MMOL/L (ref 3.5–5.1)
POTASSIUM SERPL-SCNC: 3.7 MMOL/L (ref 3.5–5.1)
POTASSIUM SERPL-SCNC: 3.8 MMOL/L (ref 3.5–5.1)
POTASSIUM SERPL-SCNC: 3.9 MMOL/L (ref 3.5–5.1)
POTASSIUM SERPL-SCNC: 4 MMOL/L (ref 3.5–5.1)
POTASSIUM SERPL-SCNC: 4.1 MMOL/L (ref 3.5–5.1)
POTASSIUM SERPL-SCNC: 4.2 MMOL/L (ref 3.5–5.1)
POTASSIUM SERPL-SCNC: 5 MMOL/L (ref 3.5–5.1)
PROCALCITONIN SERPL-MCNC: 0.13 NG/ML
PROCALCITONIN SERPL-MCNC: 0.42 NG/ML
PROT SERPL-MCNC: 6.1 G/DL (ref 6.4–8.2)
PROT SERPL-MCNC: 6.1 G/DL (ref 6.4–8.2)
PROT SERPL-MCNC: 6.2 G/DL (ref 6.4–8.2)
PROT SERPL-MCNC: 6.4 G/DL (ref 6.4–8.2)
PROT SERPL-MCNC: 6.6 G/DL (ref 6.4–8.2)
PROT SERPL-MCNC: 6.7 G/DL (ref 6.4–8.2)
PROT SERPL-MCNC: 6.7 G/DL (ref 6.4–8.2)
PROT SERPL-MCNC: 7.3 G/DL (ref 6.4–8.2)
PROT SERPL-MCNC: 7.7 G/DL (ref 6.4–8.2)
PROT UR STRIP-MCNC: 300 MG/DL
PROTHROMBIN TIME: 10.9 SEC (ref 9–11.1)
PROTHROMBIN TIME: 11.4 SEC (ref 9–11.1)
Q-T INTERVAL, ECG07: 390 MS
Q-T INTERVAL, ECG07: 448 MS
QRS DURATION, ECG06: 80 MS
QRS DURATION, ECG06: 90 MS
QTC CALCULATION (BEZET), ECG08: 471 MS
QTC CALCULATION (BEZET), ECG08: 493 MS
RBC # BLD AUTO: 2.69 M/UL (ref 3.8–5.2)
RBC # BLD AUTO: 2.7 M/UL (ref 3.8–5.2)
RBC # BLD AUTO: 2.92 M/UL (ref 3.8–5.2)
RBC # BLD AUTO: 2.96 M/UL (ref 3.8–5.2)
RBC # BLD AUTO: 3 M/UL (ref 3.8–5.2)
RBC # BLD AUTO: 3.14 M/UL (ref 3.8–5.2)
RBC # BLD AUTO: 3.31 M/UL (ref 3.8–5.2)
RBC #/AREA URNS HPF: ABNORMAL /HPF (ref 0–5)
RBC MORPH BLD: ABNORMAL
RSV RNA SPEC QL NAA+PROBE: NOT DETECTED
RV+EV RNA SPEC QL NAA+PROBE: NOT DETECTED
S PNEUM AG SPEC QL LA: NEGATIVE
SAMPLES BEING HELD,HOLD: NORMAL
SARS-COV-2, COV2: NOT DETECTED
SERVICE CMNT-IMP: ABNORMAL
SERVICE CMNT-IMP: NORMAL
SODIUM SERPL-SCNC: 137 MMOL/L (ref 136–145)
SODIUM SERPL-SCNC: 138 MMOL/L (ref 136–145)
SODIUM SERPL-SCNC: 139 MMOL/L (ref 136–145)
SODIUM SERPL-SCNC: 140 MMOL/L (ref 136–145)
SODIUM SERPL-SCNC: 141 MMOL/L (ref 136–145)
SODIUM SERPL-SCNC: 141 MMOL/L (ref 136–145)
SODIUM SERPL-SCNC: 142 MMOL/L (ref 136–145)
SODIUM SERPL-SCNC: 144 MMOL/L (ref 136–145)
SODIUM SERPL-SCNC: 144 MMOL/L (ref 136–145)
SODIUM SERPL-SCNC: 145 MMOL/L (ref 136–145)
SODIUM SERPL-SCNC: 146 MMOL/L (ref 136–145)
SP GR UR REFRACTOMETRY: 1.01 (ref 1–1.03)
SP1: NORMAL
SP2: NORMAL
SP3: NORMAL
SPECIMEN EXP DATE BLD: NORMAL
SPECIMEN SOURCE, FCOV2M: NORMAL
SPECIMEN SOURCE: NORMAL
SPECIMEN SOURCE: NORMAL
SPECIMEN, SPNG1: NORMAL
T4 FREE SERPL-MCNC: 1.1 NG/DL (ref 0.8–1.5)
TIBC SERPL-MCNC: 256 UG/DL (ref 250–450)
TRIGL SERPL-MCNC: 78 MG/DL (ref ?–150)
TROPONIN I SERPL-MCNC: 0.05 NG/ML
TROPONIN I SERPL-MCNC: 0.05 NG/ML
TROPONIN I SERPL-MCNC: 0.08 NG/ML
TROPONIN I SERPL-MCNC: <0.05 NG/ML
TROPONIN I SERPL-MCNC: <0.05 NG/ML
TSH SERPL DL<=0.05 MIU/L-ACNC: 1.51 UIU/ML (ref 0.36–3.74)
UA: UC IF INDICATED,UAUC: ABNORMAL
UROBILINOGEN UR QL STRIP.AUTO: 1 EU/DL (ref 0.2–1)
VENTRICULAR RATE, ECG03: 73 BPM
VENTRICULAR RATE, ECG03: 88 BPM
VLDLC SERPL CALC-MCNC: 15.6 MG/DL
WBC # BLD AUTO: 10.5 K/UL (ref 3.6–11)
WBC # BLD AUTO: 11.5 K/UL (ref 3.6–11)
WBC # BLD AUTO: 11.9 K/UL (ref 3.6–11)
WBC # BLD AUTO: 12 K/UL (ref 3.6–11)
WBC # BLD AUTO: 12.1 K/UL (ref 3.6–11)
WBC # BLD AUTO: 13.4 K/UL (ref 3.6–11)
WBC # BLD AUTO: 15.3 K/UL (ref 3.6–11)
WBC # BLD AUTO: 15.5 K/UL (ref 3.6–11)
WBC # BLD AUTO: 9.7 K/UL (ref 3.6–11)
WBC URNS QL MICRO: ABNORMAL /HPF (ref 0–4)

## 2020-01-01 PROCEDURE — 74011000250 HC RX REV CODE- 250: Performed by: NURSE PRACTITIONER

## 2020-01-01 PROCEDURE — 74011250637 HC RX REV CODE- 250/637: Performed by: ORTHOPAEDIC SURGERY

## 2020-01-01 PROCEDURE — 74011636637 HC RX REV CODE- 636/637: Performed by: ORTHOPAEDIC SURGERY

## 2020-01-01 PROCEDURE — 97530 THERAPEUTIC ACTIVITIES: CPT

## 2020-01-01 PROCEDURE — 74011636637 HC RX REV CODE- 636/637: Performed by: INTERNAL MEDICINE

## 2020-01-01 PROCEDURE — 74011250636 HC RX REV CODE- 250/636: Performed by: INTERNAL MEDICINE

## 2020-01-01 PROCEDURE — 82962 GLUCOSE BLOOD TEST: CPT

## 2020-01-01 PROCEDURE — 74011250637 HC RX REV CODE- 250/637: Performed by: INTERNAL MEDICINE

## 2020-01-01 PROCEDURE — 94760 N-INVAS EAR/PLS OXIMETRY 1: CPT

## 2020-01-01 PROCEDURE — 85025 COMPLETE CBC W/AUTO DIFF WBC: CPT

## 2020-01-01 PROCEDURE — 83605 ASSAY OF LACTIC ACID: CPT

## 2020-01-01 PROCEDURE — 65270000029 HC RM PRIVATE

## 2020-01-01 PROCEDURE — 93970 EXTREMITY STUDY: CPT

## 2020-01-01 PROCEDURE — 36415 COLL VENOUS BLD VENIPUNCTURE: CPT

## 2020-01-01 PROCEDURE — C1713 ANCHOR/SCREW BN/BN,TIS/BN: HCPCS | Performed by: ORTHOPAEDIC SURGERY

## 2020-01-01 PROCEDURE — 74011250636 HC RX REV CODE- 250/636: Performed by: NURSE PRACTITIONER

## 2020-01-01 PROCEDURE — 51798 US URINE CAPACITY MEASURE: CPT

## 2020-01-01 PROCEDURE — 83516 IMMUNOASSAY NONANTIBODY: CPT

## 2020-01-01 PROCEDURE — C9113 INJ PANTOPRAZOLE SODIUM, VIA: HCPCS | Performed by: NURSE PRACTITIONER

## 2020-01-01 PROCEDURE — 74011000258 HC RX REV CODE- 258: Performed by: ORTHOPAEDIC SURGERY

## 2020-01-01 PROCEDURE — 97530 THERAPEUTIC ACTIVITIES: CPT | Performed by: OCCUPATIONAL THERAPIST

## 2020-01-01 PROCEDURE — 92526 ORAL FUNCTION THERAPY: CPT

## 2020-01-01 PROCEDURE — 77030011640 HC PAD GRND REM COVD -A: Performed by: ORTHOPAEDIC SURGERY

## 2020-01-01 PROCEDURE — 74011000250 HC RX REV CODE- 250: Performed by: ORTHOPAEDIC SURGERY

## 2020-01-01 PROCEDURE — 81001 URINALYSIS AUTO W/SCOPE: CPT

## 2020-01-01 PROCEDURE — 77030018836 HC SOL IRR NACL ICUM -A: Performed by: ORTHOPAEDIC SURGERY

## 2020-01-01 PROCEDURE — 77010033678 HC OXYGEN DAILY

## 2020-01-01 PROCEDURE — 74011000258 HC RX REV CODE- 258: Performed by: INTERNAL MEDICINE

## 2020-01-01 PROCEDURE — 71250 CT THORAX DX C-: CPT

## 2020-01-01 PROCEDURE — 87449 NOS EACH ORGANISM AG IA: CPT

## 2020-01-01 PROCEDURE — 77030036660

## 2020-01-01 PROCEDURE — 74011250637 HC RX REV CODE- 250/637: Performed by: NURSE PRACTITIONER

## 2020-01-01 PROCEDURE — 77030008684 HC TU ET CUF COVD -B: Performed by: ANESTHESIOLOGY

## 2020-01-01 PROCEDURE — 94640 AIRWAY INHALATION TREATMENT: CPT

## 2020-01-01 PROCEDURE — 87635 SARS-COV-2 COVID-19 AMP PRB: CPT

## 2020-01-01 PROCEDURE — 80048 BASIC METABOLIC PNL TOTAL CA: CPT

## 2020-01-01 PROCEDURE — 83880 ASSAY OF NATRIURETIC PEPTIDE: CPT

## 2020-01-01 PROCEDURE — 74011250636 HC RX REV CODE- 250/636: Performed by: ORTHOPAEDIC SURGERY

## 2020-01-01 PROCEDURE — 76000 FLUOROSCOPY <1 HR PHYS/QHP: CPT

## 2020-01-01 PROCEDURE — 71045 X-RAY EXAM CHEST 1 VIEW: CPT

## 2020-01-01 PROCEDURE — 74011000250 HC RX REV CODE- 250: Performed by: INTERNAL MEDICINE

## 2020-01-01 PROCEDURE — C1769 GUIDE WIRE: HCPCS | Performed by: ORTHOPAEDIC SURGERY

## 2020-01-01 PROCEDURE — 86900 BLOOD TYPING SEROLOGIC ABO: CPT

## 2020-01-01 PROCEDURE — 74011250636 HC RX REV CODE- 250/636: Performed by: EMERGENCY MEDICINE

## 2020-01-01 PROCEDURE — 85018 HEMOGLOBIN: CPT

## 2020-01-01 PROCEDURE — 84484 ASSAY OF TROPONIN QUANT: CPT

## 2020-01-01 PROCEDURE — C1751 CATH, INF, PER/CENT/MIDLINE: HCPCS

## 2020-01-01 PROCEDURE — 93005 ELECTROCARDIOGRAM TRACING: CPT

## 2020-01-01 PROCEDURE — 87899 AGENT NOS ASSAY W/OPTIC: CPT

## 2020-01-01 PROCEDURE — 76010000153 HC OR TIME 1.5 TO 2 HR: Performed by: ORTHOPAEDIC SURGERY

## 2020-01-01 PROCEDURE — 74011250636 HC RX REV CODE- 250/636: Performed by: NURSE ANESTHETIST, CERTIFIED REGISTERED

## 2020-01-01 PROCEDURE — 83520 IMMUNOASSAY QUANT NOS NONAB: CPT

## 2020-01-01 PROCEDURE — 77030019908 HC STETH ESOPH SIMS -A: Performed by: ANESTHESIOLOGY

## 2020-01-01 PROCEDURE — 77030005518 HC CATH URETH FOL 2W BARD -B

## 2020-01-01 PROCEDURE — 82533 TOTAL CORTISOL: CPT

## 2020-01-01 PROCEDURE — 77030019905 HC CATH URETH INTMIT MDII -A

## 2020-01-01 PROCEDURE — 80053 COMPREHEN METABOLIC PANEL: CPT

## 2020-01-01 PROCEDURE — 82728 ASSAY OF FERRITIN: CPT

## 2020-01-01 PROCEDURE — 74011000250 HC RX REV CODE- 250: Performed by: HOSPITALIST

## 2020-01-01 PROCEDURE — 83735 ASSAY OF MAGNESIUM: CPT

## 2020-01-01 PROCEDURE — 92610 EVALUATE SWALLOWING FUNCTION: CPT

## 2020-01-01 PROCEDURE — 51702 INSERT TEMP BLADDER CATH: CPT

## 2020-01-01 PROCEDURE — 77030029684 HC NEB SM VOL KT MONA -A

## 2020-01-01 PROCEDURE — 97535 SELF CARE MNGMENT TRAINING: CPT | Performed by: OCCUPATIONAL THERAPIST

## 2020-01-01 PROCEDURE — 74011250636 HC RX REV CODE- 250/636: Performed by: HOSPITALIST

## 2020-01-01 PROCEDURE — 83540 ASSAY OF IRON: CPT

## 2020-01-01 PROCEDURE — 94761 N-INVAS EAR/PLS OXIMETRY MLT: CPT

## 2020-01-01 PROCEDURE — 83036 HEMOGLOBIN GLYCOSYLATED A1C: CPT

## 2020-01-01 PROCEDURE — 97162 PT EVAL MOD COMPLEX 30 MIN: CPT

## 2020-01-01 PROCEDURE — 77030020061 HC IV BLD WRMR ADMIN SET 3M -B: Performed by: ANESTHESIOLOGY

## 2020-01-01 PROCEDURE — 93306 TTE W/DOPPLER COMPLETE: CPT

## 2020-01-01 PROCEDURE — 85379 FIBRIN DEGRADATION QUANT: CPT

## 2020-01-01 PROCEDURE — 72192 CT PELVIS W/O DYE: CPT

## 2020-01-01 PROCEDURE — 74011000250 HC RX REV CODE- 250: Performed by: NURSE ANESTHETIST, CERTIFIED REGISTERED

## 2020-01-01 PROCEDURE — 76210000006 HC OR PH I REC 0.5 TO 1 HR: Performed by: ORTHOPAEDIC SURGERY

## 2020-01-01 PROCEDURE — 87798 DETECT AGENT NOS DNA AMP: CPT

## 2020-01-01 PROCEDURE — 76705 ECHO EXAM OF ABDOMEN: CPT

## 2020-01-01 PROCEDURE — 84443 ASSAY THYROID STIM HORMONE: CPT

## 2020-01-01 PROCEDURE — 77030020788: Performed by: ORTHOPAEDIC SURGERY

## 2020-01-01 PROCEDURE — 0100U RESPIRATORY PANEL,PCR,NASOPHARYNGEAL: CPT

## 2020-01-01 PROCEDURE — 0QS606Z REPOSITION RIGHT UPPER FEMUR WITH INTRAMEDULLARY INTERNAL FIXATION DEVICE, OPEN APPROACH: ICD-10-PCS | Performed by: ORTHOPAEDIC SURGERY

## 2020-01-01 PROCEDURE — 85027 COMPLETE CBC AUTOMATED: CPT

## 2020-01-01 PROCEDURE — 87186 SC STD MICRODIL/AGAR DIL: CPT

## 2020-01-01 PROCEDURE — 77030016474 HC BIT DRL QC3 SYNT -C: Performed by: ORTHOPAEDIC SURGERY

## 2020-01-01 PROCEDURE — 84145 PROCALCITONIN (PCT): CPT

## 2020-01-01 PROCEDURE — 86038 ANTINUCLEAR ANTIBODIES: CPT

## 2020-01-01 PROCEDURE — 65660000000 HC RM CCU STEPDOWN

## 2020-01-01 PROCEDURE — 77030040361 HC SLV COMPR DVT MDII -B: Performed by: ORTHOPAEDIC SURGERY

## 2020-01-01 PROCEDURE — 85610 PROTHROMBIN TIME: CPT

## 2020-01-01 PROCEDURE — P9045 ALBUMIN (HUMAN), 5%, 250 ML: HCPCS | Performed by: NURSE ANESTHETIST, CERTIFIED REGISTERED

## 2020-01-01 PROCEDURE — 77030008462 HC STPLR SKN PROX J&J -A: Performed by: ORTHOPAEDIC SURGERY

## 2020-01-01 PROCEDURE — 74181 MRI ABDOMEN W/O CONTRAST: CPT

## 2020-01-01 PROCEDURE — 74011000258 HC RX REV CODE- 258: Performed by: NURSE PRACTITIONER

## 2020-01-01 PROCEDURE — 77030026438 HC STYL ET INTUB CARD -A: Performed by: ANESTHESIOLOGY

## 2020-01-01 PROCEDURE — 77030013079 HC BLNKT BAIR HGGR 3M -A: Performed by: ANESTHESIOLOGY

## 2020-01-01 PROCEDURE — 83915 ASSAY OF NUCLEOTIDASE: CPT

## 2020-01-01 PROCEDURE — 70450 CT HEAD/BRAIN W/O DYE: CPT

## 2020-01-01 PROCEDURE — 74011000250 HC RX REV CODE- 250: Performed by: EMERGENCY MEDICINE

## 2020-01-01 PROCEDURE — 87086 URINE CULTURE/COLONY COUNT: CPT

## 2020-01-01 PROCEDURE — 97116 GAIT TRAINING THERAPY: CPT | Performed by: PHYSICAL THERAPIST

## 2020-01-01 PROCEDURE — 74011250637 HC RX REV CODE- 250/637: Performed by: HOSPITALIST

## 2020-01-01 PROCEDURE — 84080 ASSAY ALKALINE PHOSPHATASES: CPT

## 2020-01-01 PROCEDURE — 80074 ACUTE HEPATITIS PANEL: CPT

## 2020-01-01 PROCEDURE — 76060000034 HC ANESTHESIA 1.5 TO 2 HR: Performed by: ORTHOPAEDIC SURGERY

## 2020-01-01 PROCEDURE — 80061 LIPID PANEL: CPT

## 2020-01-01 PROCEDURE — 84439 ASSAY OF FREE THYROXINE: CPT

## 2020-01-01 PROCEDURE — 87040 BLOOD CULTURE FOR BACTERIA: CPT

## 2020-01-01 PROCEDURE — 82977 ASSAY OF GGT: CPT

## 2020-01-01 PROCEDURE — 99285 EMERGENCY DEPT VISIT HI MDM: CPT

## 2020-01-01 PROCEDURE — 86140 C-REACTIVE PROTEIN: CPT

## 2020-01-01 PROCEDURE — 77030031139 HC SUT VCRL2 J&J -A: Performed by: ORTHOPAEDIC SURGERY

## 2020-01-01 PROCEDURE — 73502 X-RAY EXAM HIP UNI 2-3 VIEWS: CPT

## 2020-01-01 PROCEDURE — 82550 ASSAY OF CK (CPK): CPT

## 2020-01-01 PROCEDURE — 77030018846 HC SOL IRR STRL H20 ICUM -A: Performed by: ORTHOPAEDIC SURGERY

## 2020-01-01 PROCEDURE — 87529 HSV DNA AMP PROBE: CPT

## 2020-01-01 PROCEDURE — 74011636637 HC RX REV CODE- 636/637: Performed by: NURSE PRACTITIONER

## 2020-01-01 PROCEDURE — 97165 OT EVAL LOW COMPLEX 30 MIN: CPT | Performed by: OCCUPATIONAL THERAPIST

## 2020-01-01 PROCEDURE — 74011000272 HC RX REV CODE- 272: Performed by: ORTHOPAEDIC SURGERY

## 2020-01-01 PROCEDURE — 74011250636 HC RX REV CODE- 250/636: Performed by: ANESTHESIOLOGY

## 2020-01-01 PROCEDURE — 87077 CULTURE AEROBIC IDENTIFY: CPT

## 2020-01-01 PROCEDURE — 83615 LACTATE (LD) (LDH) ENZYME: CPT

## 2020-01-01 DEVICE — NAIL IM L170MM DIA11MM NK 125DEG SHT PROX FEM GRN TI-15MO: Type: IMPLANTABLE DEVICE | Site: FEMUR | Status: FUNCTIONAL

## 2020-01-01 DEVICE — SCREW BNE L85MM DIA10.35MM G TI CANN PERF FOR PROX FEM: Type: IMPLANTABLE DEVICE | Site: FEMUR | Status: FUNCTIONAL

## 2020-01-01 DEVICE — SCREW BNE L34MM DIA5MM NONSTERILE TIB LT GRN TI ST CANN LOK: Type: IMPLANTABLE DEVICE | Site: FEMUR | Status: FUNCTIONAL

## 2020-01-01 RX ORDER — ASCORBIC ACID 500 MG
500 TABLET ORAL 2 TIMES DAILY
Status: DISPENSED | OUTPATIENT
Start: 2020-01-01 | End: 2020-01-01

## 2020-01-01 RX ORDER — SODIUM CHLORIDE 0.9 % (FLUSH) 0.9 %
5-40 SYRINGE (ML) INJECTION EVERY 8 HOURS
Status: DISCONTINUED | OUTPATIENT
Start: 2020-01-01 | End: 2020-01-01

## 2020-01-01 RX ORDER — ALBUTEROL SULFATE 0.83 MG/ML
2.5 SOLUTION RESPIRATORY (INHALATION)
Status: DISCONTINUED | OUTPATIENT
Start: 2020-01-01 | End: 2020-01-01

## 2020-01-01 RX ORDER — FENTANYL CITRATE 50 UG/ML
25 INJECTION, SOLUTION INTRAMUSCULAR; INTRAVENOUS
Status: DISCONTINUED | OUTPATIENT
Start: 2020-01-01 | End: 2020-01-01 | Stop reason: HOSPADM

## 2020-01-01 RX ORDER — SODIUM CHLORIDE, SODIUM LACTATE, POTASSIUM CHLORIDE, CALCIUM CHLORIDE 600; 310; 30; 20 MG/100ML; MG/100ML; MG/100ML; MG/100ML
25 INJECTION, SOLUTION INTRAVENOUS CONTINUOUS
Status: DISCONTINUED | OUTPATIENT
Start: 2020-01-01 | End: 2020-01-01 | Stop reason: HOSPADM

## 2020-01-01 RX ORDER — PHENYLEPHRINE HCL IN 0.9% NACL 0.4MG/10ML
SYRINGE (ML) INTRAVENOUS AS NEEDED
Status: DISCONTINUED | OUTPATIENT
Start: 2020-01-01 | End: 2020-01-01 | Stop reason: HOSPADM

## 2020-01-01 RX ORDER — DEXTROSE 50 % IN WATER (D50W) INTRAVENOUS SYRINGE
25-50 AS NEEDED
Status: DISCONTINUED | OUTPATIENT
Start: 2020-01-01 | End: 2020-05-19 | Stop reason: HOSPADM

## 2020-01-01 RX ORDER — NALOXONE HYDROCHLORIDE 0.4 MG/ML
0.4 INJECTION, SOLUTION INTRAMUSCULAR; INTRAVENOUS; SUBCUTANEOUS AS NEEDED
Status: DISCONTINUED | OUTPATIENT
Start: 2020-01-01 | End: 2020-05-19 | Stop reason: HOSPADM

## 2020-01-01 RX ORDER — POTASSIUM CHLORIDE 750 MG/1
20 TABLET, FILM COATED, EXTENDED RELEASE ORAL
Status: COMPLETED | OUTPATIENT
Start: 2020-01-01 | End: 2020-01-01

## 2020-01-01 RX ORDER — SODIUM CHLORIDE 0.9 % (FLUSH) 0.9 %
5-40 SYRINGE (ML) INJECTION AS NEEDED
Status: DISCONTINUED | OUTPATIENT
Start: 2020-01-01 | End: 2020-01-01

## 2020-01-01 RX ORDER — FUROSEMIDE 10 MG/ML
40 INJECTION INTRAMUSCULAR; INTRAVENOUS ONCE
Status: COMPLETED | OUTPATIENT
Start: 2020-01-01 | End: 2020-01-01

## 2020-01-01 RX ORDER — SODIUM CHLORIDE 9 MG/ML
75 INJECTION, SOLUTION INTRAVENOUS CONTINUOUS
Status: DISCONTINUED | OUTPATIENT
Start: 2020-01-01 | End: 2020-01-01

## 2020-01-01 RX ORDER — PANTOPRAZOLE SODIUM 40 MG/1
40 TABLET, DELAYED RELEASE ORAL DAILY
COMMUNITY

## 2020-01-01 RX ORDER — SODIUM CHLORIDE 0.9 % (FLUSH) 0.9 %
5-40 SYRINGE (ML) INJECTION EVERY 8 HOURS
Status: DISCONTINUED | OUTPATIENT
Start: 2020-01-01 | End: 2020-05-19 | Stop reason: HOSPADM

## 2020-01-01 RX ORDER — ATORVASTATIN CALCIUM 40 MG/1
40 TABLET, FILM COATED ORAL DAILY
Status: DISCONTINUED | OUTPATIENT
Start: 2020-01-01 | End: 2020-01-01

## 2020-01-01 RX ORDER — GUAIFENESIN 600 MG/1
600 TABLET, EXTENDED RELEASE ORAL
COMMUNITY

## 2020-01-01 RX ORDER — ACETAMINOPHEN 325 MG/1
650 TABLET ORAL EVERY 6 HOURS
Status: DISCONTINUED | OUTPATIENT
Start: 2020-01-01 | End: 2020-01-01

## 2020-01-01 RX ORDER — CEFAZOLIN SODIUM 1 G/3ML
INJECTION, POWDER, FOR SOLUTION INTRAMUSCULAR; INTRAVENOUS AS NEEDED
Status: DISCONTINUED | OUTPATIENT
Start: 2020-01-01 | End: 2020-01-01 | Stop reason: HOSPADM

## 2020-01-01 RX ORDER — ONDANSETRON 2 MG/ML
INJECTION INTRAMUSCULAR; INTRAVENOUS AS NEEDED
Status: DISCONTINUED | OUTPATIENT
Start: 2020-01-01 | End: 2020-01-01 | Stop reason: HOSPADM

## 2020-01-01 RX ORDER — METOPROLOL TARTRATE 5 MG/5ML
2.5 INJECTION INTRAVENOUS EVERY 6 HOURS
Status: DISCONTINUED | OUTPATIENT
Start: 2020-01-01 | End: 2020-01-01

## 2020-01-01 RX ORDER — SODIUM CHLORIDE 0.9 % (FLUSH) 0.9 %
5-40 SYRINGE (ML) INJECTION AS NEEDED
Status: DISCONTINUED | OUTPATIENT
Start: 2020-01-01 | End: 2020-01-01 | Stop reason: HOSPADM

## 2020-01-01 RX ORDER — POTASSIUM CHLORIDE 7.45 MG/ML
10 INJECTION INTRAVENOUS
Status: COMPLETED | OUTPATIENT
Start: 2020-01-01 | End: 2020-01-01

## 2020-01-01 RX ORDER — DONEPEZIL HYDROCHLORIDE 5 MG/1
5 TABLET, FILM COATED ORAL
Status: DISCONTINUED | OUTPATIENT
Start: 2020-01-01 | End: 2020-01-01

## 2020-01-01 RX ORDER — ACETAMINOPHEN 325 MG/1
650 TABLET ORAL
Status: DISCONTINUED | OUTPATIENT
Start: 2020-01-01 | End: 2020-05-19 | Stop reason: HOSPADM

## 2020-01-01 RX ORDER — ATORVASTATIN CALCIUM 40 MG/1
40 TABLET, FILM COATED ORAL DAILY
COMMUNITY

## 2020-01-01 RX ORDER — ROCURONIUM BROMIDE 10 MG/ML
INJECTION, SOLUTION INTRAVENOUS AS NEEDED
Status: DISCONTINUED | OUTPATIENT
Start: 2020-01-01 | End: 2020-01-01 | Stop reason: HOSPADM

## 2020-01-01 RX ORDER — ACETAMINOPHEN 650 MG/1
650 SUPPOSITORY RECTAL
Status: DISCONTINUED | OUTPATIENT
Start: 2020-01-01 | End: 2020-01-01 | Stop reason: SDUPTHER

## 2020-01-01 RX ORDER — ASCORBIC ACID 500 MG
500 TABLET ORAL DAILY
COMMUNITY

## 2020-01-01 RX ORDER — FENTANYL CITRATE 50 UG/ML
25 INJECTION, SOLUTION INTRAMUSCULAR; INTRAVENOUS
Status: DISCONTINUED | OUTPATIENT
Start: 2020-01-01 | End: 2020-01-01

## 2020-01-01 RX ORDER — LORAZEPAM 2 MG/ML
0.5 INJECTION INTRAMUSCULAR ONCE
Status: COMPLETED | OUTPATIENT
Start: 2020-01-01 | End: 2020-01-01

## 2020-01-01 RX ORDER — INSULIN LISPRO 100 [IU]/ML
10 INJECTION, SOLUTION INTRAVENOUS; SUBCUTANEOUS ONCE
Status: COMPLETED | OUTPATIENT
Start: 2020-01-01 | End: 2020-01-01

## 2020-01-01 RX ORDER — METOPROLOL TARTRATE 50 MG/1
50 TABLET ORAL 2 TIMES DAILY
Status: DISCONTINUED | OUTPATIENT
Start: 2020-01-01 | End: 2020-01-01

## 2020-01-01 RX ORDER — METOPROLOL TARTRATE 50 MG/1
TABLET ORAL 2 TIMES DAILY
Status: ON HOLD | COMMUNITY
End: 2020-01-01

## 2020-01-01 RX ORDER — HALOPERIDOL 5 MG/ML
2 INJECTION INTRAMUSCULAR
Status: DISCONTINUED | OUTPATIENT
Start: 2020-01-01 | End: 2020-05-19 | Stop reason: HOSPADM

## 2020-01-01 RX ORDER — LABETALOL HYDROCHLORIDE 5 MG/ML
20 INJECTION, SOLUTION INTRAVENOUS
Status: DISCONTINUED | OUTPATIENT
Start: 2020-01-01 | End: 2020-01-01

## 2020-01-01 RX ORDER — ETOMIDATE 2 MG/ML
INJECTION INTRAVENOUS AS NEEDED
Status: DISCONTINUED | OUTPATIENT
Start: 2020-01-01 | End: 2020-01-01 | Stop reason: HOSPADM

## 2020-01-01 RX ORDER — FERROUS SULFATE, DRIED 160(50) MG
1 TABLET, EXTENDED RELEASE ORAL
Status: DISCONTINUED | OUTPATIENT
Start: 2020-01-01 | End: 2020-05-19 | Stop reason: HOSPADM

## 2020-01-01 RX ORDER — SODIUM CHLORIDE 9 MG/ML
125 INJECTION, SOLUTION INTRAVENOUS CONTINUOUS
Status: DISPENSED | OUTPATIENT
Start: 2020-01-01 | End: 2020-01-01

## 2020-01-01 RX ORDER — HEPARIN SODIUM 5000 [USP'U]/ML
5000 INJECTION, SOLUTION INTRAVENOUS; SUBCUTANEOUS EVERY 8 HOURS
Status: DISCONTINUED | OUTPATIENT
Start: 2020-01-01 | End: 2020-05-19 | Stop reason: HOSPADM

## 2020-01-01 RX ORDER — DIPHENHYDRAMINE HYDROCHLORIDE 50 MG/ML
12.5 INJECTION, SOLUTION INTRAMUSCULAR; INTRAVENOUS
Status: DISCONTINUED | OUTPATIENT
Start: 2020-01-01 | End: 2020-01-01 | Stop reason: HOSPADM

## 2020-01-01 RX ORDER — TRAMADOL HYDROCHLORIDE 50 MG/1
50 TABLET ORAL
Status: DISCONTINUED | OUTPATIENT
Start: 2020-01-01 | End: 2020-01-01

## 2020-01-01 RX ORDER — FUROSEMIDE 20 MG/1
20 TABLET ORAL DAILY
Status: DISCONTINUED | OUTPATIENT
Start: 2020-01-01 | End: 2020-01-01

## 2020-01-01 RX ORDER — SUCCINYLCHOLINE CHLORIDE 20 MG/ML
INJECTION INTRAMUSCULAR; INTRAVENOUS AS NEEDED
Status: DISCONTINUED | OUTPATIENT
Start: 2020-01-01 | End: 2020-01-01 | Stop reason: HOSPADM

## 2020-01-01 RX ORDER — FENTANYL CITRATE 50 UG/ML
INJECTION, SOLUTION INTRAMUSCULAR; INTRAVENOUS AS NEEDED
Status: DISCONTINUED | OUTPATIENT
Start: 2020-01-01 | End: 2020-01-01 | Stop reason: HOSPADM

## 2020-01-01 RX ORDER — OXYCODONE HYDROCHLORIDE 5 MG/1
2.5 TABLET ORAL
Status: DISCONTINUED | OUTPATIENT
Start: 2020-01-01 | End: 2020-05-19 | Stop reason: HOSPADM

## 2020-01-01 RX ORDER — POLYETHYLENE GLYCOL 3350 17 G/17G
17 POWDER, FOR SOLUTION ORAL DAILY
Status: DISCONTINUED | OUTPATIENT
Start: 2020-01-01 | End: 2020-05-19 | Stop reason: HOSPADM

## 2020-01-01 RX ORDER — AMLODIPINE BESYLATE 5 MG/1
2.5 TABLET ORAL DAILY
Status: DISCONTINUED | OUTPATIENT
Start: 2020-01-01 | End: 2020-01-01

## 2020-01-01 RX ORDER — ONDANSETRON 2 MG/ML
4 INJECTION INTRAMUSCULAR; INTRAVENOUS AS NEEDED
Status: DISCONTINUED | OUTPATIENT
Start: 2020-01-01 | End: 2020-01-01 | Stop reason: HOSPADM

## 2020-01-01 RX ORDER — AMPICILLIN 250 MG/1
250 INJECTION, POWDER, FOR SOLUTION INTRAMUSCULAR; INTRAVENOUS EVERY 6 HOURS
Status: DISCONTINUED | OUTPATIENT
Start: 2020-01-01 | End: 2020-01-01

## 2020-01-01 RX ORDER — SODIUM CHLORIDE 0.9 % (FLUSH) 0.9 %
5-40 SYRINGE (ML) INJECTION AS NEEDED
Status: DISCONTINUED | OUTPATIENT
Start: 2020-01-01 | End: 2020-05-19 | Stop reason: HOSPADM

## 2020-01-01 RX ORDER — OXYCODONE HYDROCHLORIDE 5 MG/1
2.5 TABLET ORAL
Status: DISCONTINUED | OUTPATIENT
Start: 2020-01-01 | End: 2020-01-01 | Stop reason: SDUPTHER

## 2020-01-01 RX ORDER — INSULIN GLARGINE 100 [IU]/ML
5 INJECTION, SOLUTION SUBCUTANEOUS
Status: DISCONTINUED | OUTPATIENT
Start: 2020-01-01 | End: 2020-05-19 | Stop reason: HOSPADM

## 2020-01-01 RX ORDER — METOPROLOL SUCCINATE 50 MG/1
50 TABLET, EXTENDED RELEASE ORAL DAILY
COMMUNITY

## 2020-01-01 RX ORDER — AMOXICILLIN 250 MG
2 CAPSULE ORAL 2 TIMES DAILY
Status: DISCONTINUED | OUTPATIENT
Start: 2020-01-01 | End: 2020-05-19 | Stop reason: HOSPADM

## 2020-01-01 RX ORDER — INSULIN LISPRO 100 [IU]/ML
3 INJECTION, SOLUTION INTRAVENOUS; SUBCUTANEOUS
Status: DISCONTINUED | OUTPATIENT
Start: 2020-01-01 | End: 2020-01-01

## 2020-01-01 RX ORDER — ACETAMINOPHEN 325 MG/1
650 TABLET ORAL EVERY 6 HOURS
Status: DISCONTINUED | OUTPATIENT
Start: 2020-01-01 | End: 2020-01-01 | Stop reason: SDUPTHER

## 2020-01-01 RX ORDER — LIDOCAINE HYDROCHLORIDE 10 MG/ML
0.1 INJECTION, SOLUTION EPIDURAL; INFILTRATION; INTRACAUDAL; PERINEURAL AS NEEDED
Status: DISCONTINUED | OUTPATIENT
Start: 2020-01-01 | End: 2020-01-01 | Stop reason: HOSPADM

## 2020-01-01 RX ORDER — SODIUM CHLORIDE 0.9 % (FLUSH) 0.9 %
5-40 SYRINGE (ML) INJECTION EVERY 8 HOURS
Status: DISCONTINUED | OUTPATIENT
Start: 2020-01-01 | End: 2020-01-01 | Stop reason: HOSPADM

## 2020-01-01 RX ORDER — MORPHINE SULFATE 2 MG/ML
INJECTION, SOLUTION INTRAMUSCULAR; INTRAVENOUS
Status: DISCONTINUED
Start: 2020-01-01 | End: 2020-05-19 | Stop reason: HOSPADM

## 2020-01-01 RX ORDER — ZINC SULFATE 50(220)MG
1 CAPSULE ORAL DAILY
Status: DISPENSED | OUTPATIENT
Start: 2020-01-01 | End: 2020-01-01

## 2020-01-01 RX ORDER — DONEPEZIL HYDROCHLORIDE 5 MG/1
5 TABLET, FILM COATED ORAL DAILY
COMMUNITY

## 2020-01-01 RX ORDER — ALBUTEROL SULFATE 0.83 MG/ML
2.5 SOLUTION RESPIRATORY (INHALATION)
Status: ON HOLD | COMMUNITY
End: 2020-01-01

## 2020-01-01 RX ORDER — ALBUMIN HUMAN 50 G/1000ML
SOLUTION INTRAVENOUS AS NEEDED
Status: DISCONTINUED | OUTPATIENT
Start: 2020-01-01 | End: 2020-01-01 | Stop reason: HOSPADM

## 2020-01-01 RX ORDER — SODIUM CHLORIDE 9 MG/ML
50 INJECTION, SOLUTION INTRAVENOUS CONTINUOUS
Status: DISPENSED | OUTPATIENT
Start: 2020-01-01 | End: 2020-01-01

## 2020-01-01 RX ORDER — FUROSEMIDE 40 MG/1
40 TABLET ORAL DAILY
Status: DISCONTINUED | OUTPATIENT
Start: 2020-01-01 | End: 2020-05-19 | Stop reason: HOSPADM

## 2020-01-01 RX ORDER — FACIAL-BODY WIPES
10 EACH TOPICAL DAILY PRN
Status: DISCONTINUED | OUTPATIENT
Start: 2020-01-01 | End: 2020-05-19 | Stop reason: HOSPADM

## 2020-01-01 RX ORDER — METRONIDAZOLE 500 MG/100ML
500 INJECTION, SOLUTION INTRAVENOUS EVERY 12 HOURS
Status: DISCONTINUED | OUTPATIENT
Start: 2020-01-01 | End: 2020-01-01

## 2020-01-01 RX ORDER — INSULIN GLARGINE 100 [IU]/ML
13 INJECTION, SOLUTION SUBCUTANEOUS
Status: DISCONTINUED | OUTPATIENT
Start: 2020-01-01 | End: 2020-01-01

## 2020-01-01 RX ORDER — SODIUM CHLORIDE, SODIUM LACTATE, POTASSIUM CHLORIDE, CALCIUM CHLORIDE 600; 310; 30; 20 MG/100ML; MG/100ML; MG/100ML; MG/100ML
25 INJECTION, SOLUTION INTRAVENOUS CONTINUOUS
Status: DISCONTINUED | OUTPATIENT
Start: 2020-01-01 | End: 2020-01-01

## 2020-01-01 RX ORDER — HYDRALAZINE HYDROCHLORIDE 20 MG/ML
10 INJECTION INTRAMUSCULAR; INTRAVENOUS
Status: DISCONTINUED | OUTPATIENT
Start: 2020-01-01 | End: 2020-05-19 | Stop reason: HOSPADM

## 2020-01-01 RX ORDER — QUETIAPINE FUMARATE 25 MG/1
25 TABLET, FILM COATED ORAL
Status: DISCONTINUED | OUTPATIENT
Start: 2020-01-01 | End: 2020-05-19 | Stop reason: HOSPADM

## 2020-01-01 RX ORDER — LEVOTHYROXINE SODIUM 25 UG/1
25 TABLET ORAL
Status: DISCONTINUED | OUTPATIENT
Start: 2020-01-01 | End: 2020-05-19 | Stop reason: HOSPADM

## 2020-01-01 RX ORDER — OXYCODONE HYDROCHLORIDE 5 MG/1
5 TABLET ORAL
Status: DISCONTINUED | OUTPATIENT
Start: 2020-01-01 | End: 2020-01-01 | Stop reason: SDUPTHER

## 2020-01-01 RX ORDER — HYDRALAZINE HYDROCHLORIDE 20 MG/ML
INJECTION INTRAMUSCULAR; INTRAVENOUS AS NEEDED
Status: DISCONTINUED | OUTPATIENT
Start: 2020-01-01 | End: 2020-01-01 | Stop reason: HOSPADM

## 2020-01-01 RX ORDER — MORPHINE SULFATE 2 MG/ML
1 INJECTION, SOLUTION INTRAMUSCULAR; INTRAVENOUS ONCE
Status: COMPLETED | OUTPATIENT
Start: 2020-01-01 | End: 2020-01-01

## 2020-01-01 RX ORDER — METFORMIN HYDROCHLORIDE 500 MG/1
500 TABLET ORAL 2 TIMES DAILY WITH MEALS
COMMUNITY

## 2020-01-01 RX ORDER — MORPHINE SULFATE 10 MG/ML
2 INJECTION, SOLUTION INTRAMUSCULAR; INTRAVENOUS
Status: DISCONTINUED | OUTPATIENT
Start: 2020-01-01 | End: 2020-01-01 | Stop reason: HOSPADM

## 2020-01-01 RX ORDER — OXYCODONE AND ACETAMINOPHEN 5; 325 MG/1; MG/1
1 TABLET ORAL
Status: DISCONTINUED | OUTPATIENT
Start: 2020-01-01 | End: 2020-01-01

## 2020-01-01 RX ORDER — GLYCOPYRROLATE 0.2 MG/ML
INJECTION INTRAMUSCULAR; INTRAVENOUS AS NEEDED
Status: DISCONTINUED | OUTPATIENT
Start: 2020-01-01 | End: 2020-01-01 | Stop reason: HOSPADM

## 2020-01-01 RX ORDER — LORAZEPAM 2 MG/ML
1 INJECTION INTRAMUSCULAR
Status: DISCONTINUED | OUTPATIENT
Start: 2020-01-01 | End: 2020-05-19 | Stop reason: HOSPADM

## 2020-01-01 RX ORDER — ACETAMINOPHEN 650 MG/1
650 SUPPOSITORY RECTAL
Status: DISCONTINUED | OUTPATIENT
Start: 2020-01-01 | End: 2020-05-19 | Stop reason: HOSPADM

## 2020-01-01 RX ORDER — AMLODIPINE BESYLATE 5 MG/1
10 TABLET ORAL DAILY
Status: DISCONTINUED | OUTPATIENT
Start: 2020-01-01 | End: 2020-05-19 | Stop reason: HOSPADM

## 2020-01-01 RX ORDER — HYDRALAZINE HYDROCHLORIDE 20 MG/ML
10 INJECTION INTRAMUSCULAR; INTRAVENOUS
Status: DISCONTINUED | OUTPATIENT
Start: 2020-01-01 | End: 2020-01-01

## 2020-01-01 RX ORDER — BUPROPION HYDROCHLORIDE 150 MG/1
150 TABLET, EXTENDED RELEASE ORAL 2 TIMES DAILY
COMMUNITY

## 2020-01-01 RX ORDER — ONDANSETRON 2 MG/ML
4 INJECTION INTRAMUSCULAR; INTRAVENOUS
Status: DISCONTINUED | OUTPATIENT
Start: 2020-01-01 | End: 2020-05-19 | Stop reason: HOSPADM

## 2020-01-01 RX ORDER — POTASSIUM CHLORIDE 750 MG/1
20 TABLET, FILM COATED, EXTENDED RELEASE ORAL DAILY
Status: DISPENSED | OUTPATIENT
Start: 2020-01-01 | End: 2020-01-01

## 2020-01-01 RX ORDER — CLOPIDOGREL BISULFATE 75 MG/1
75 TABLET ORAL DAILY
COMMUNITY

## 2020-01-01 RX ORDER — OXYCODONE HYDROCHLORIDE 5 MG/1
5 TABLET ORAL
Status: DISCONTINUED | OUTPATIENT
Start: 2020-01-01 | End: 2020-05-19 | Stop reason: HOSPADM

## 2020-01-01 RX ORDER — HEPARIN SODIUM 5000 [USP'U]/ML
5000 INJECTION, SOLUTION INTRAVENOUS; SUBCUTANEOUS EVERY 8 HOURS
Status: DISCONTINUED | OUTPATIENT
Start: 2020-01-01 | End: 2020-01-01

## 2020-01-01 RX ORDER — INSULIN GLARGINE 100 [IU]/ML
5 INJECTION, SOLUTION SUBCUTANEOUS
Status: DISCONTINUED | OUTPATIENT
Start: 2020-01-01 | End: 2020-01-01

## 2020-01-01 RX ORDER — ASPIRIN 325 MG
325 TABLET, DELAYED RELEASE (ENTERIC COATED) ORAL 2 TIMES DAILY
Status: DISCONTINUED | OUTPATIENT
Start: 2020-01-01 | End: 2020-05-19 | Stop reason: HOSPADM

## 2020-01-01 RX ORDER — INSULIN LISPRO 100 [IU]/ML
INJECTION, SOLUTION INTRAVENOUS; SUBCUTANEOUS
Status: DISCONTINUED | OUTPATIENT
Start: 2020-01-01 | End: 2020-05-19 | Stop reason: HOSPADM

## 2020-01-01 RX ORDER — NEOSTIGMINE METHYLSULFATE 1 MG/ML
INJECTION, SOLUTION INTRAVENOUS AS NEEDED
Status: DISCONTINUED | OUTPATIENT
Start: 2020-01-01 | End: 2020-01-01 | Stop reason: HOSPADM

## 2020-01-01 RX ORDER — INSULIN LISPRO 100 [IU]/ML
4 INJECTION, SOLUTION INTRAVENOUS; SUBCUTANEOUS
COMMUNITY

## 2020-01-01 RX ORDER — AMOXICILLIN 250 MG/1
500 CAPSULE ORAL EVERY 12 HOURS
Status: DISCONTINUED | OUTPATIENT
Start: 2020-01-01 | End: 2020-01-01

## 2020-01-01 RX ORDER — ALBUTEROL SULFATE 0.83 MG/ML
2.5 SOLUTION RESPIRATORY (INHALATION)
Status: DISPENSED | OUTPATIENT
Start: 2020-01-01 | End: 2020-01-01

## 2020-01-01 RX ORDER — MAGNESIUM SULFATE 100 %
4 CRYSTALS MISCELLANEOUS AS NEEDED
Status: DISCONTINUED | OUTPATIENT
Start: 2020-01-01 | End: 2020-05-19 | Stop reason: HOSPADM

## 2020-01-01 RX ORDER — INSULIN GLARGINE 100 [IU]/ML
10 INJECTION, SOLUTION SUBCUTANEOUS
Status: DISCONTINUED | OUTPATIENT
Start: 2020-01-01 | End: 2020-01-01

## 2020-01-01 RX ORDER — PANTOPRAZOLE SODIUM 40 MG/1
40 TABLET, DELAYED RELEASE ORAL DAILY
Status: DISCONTINUED | OUTPATIENT
Start: 2020-01-01 | End: 2020-05-19 | Stop reason: HOSPADM

## 2020-01-01 RX ORDER — CLOPIDOGREL BISULFATE 75 MG/1
75 TABLET ORAL DAILY
Status: DISCONTINUED | OUTPATIENT
Start: 2020-01-01 | End: 2020-01-01

## 2020-01-01 RX ORDER — NALOXONE HYDROCHLORIDE 0.4 MG/ML
0.4 INJECTION, SOLUTION INTRAMUSCULAR; INTRAVENOUS; SUBCUTANEOUS AS NEEDED
Status: DISCONTINUED | OUTPATIENT
Start: 2020-01-01 | End: 2020-01-01 | Stop reason: SDUPTHER

## 2020-01-01 RX ORDER — INSULIN GLARGINE 100 [IU]/ML
15 INJECTION, SOLUTION SUBCUTANEOUS DAILY
COMMUNITY

## 2020-01-01 RX ORDER — SODIUM CHLORIDE, SODIUM LACTATE, POTASSIUM CHLORIDE, CALCIUM CHLORIDE 600; 310; 30; 20 MG/100ML; MG/100ML; MG/100ML; MG/100ML
INJECTION, SOLUTION INTRAVENOUS
Status: DISCONTINUED | OUTPATIENT
Start: 2020-01-01 | End: 2020-01-01 | Stop reason: HOSPADM

## 2020-01-01 RX ADMIN — PANTOPRAZOLE SODIUM 40 MG: 40 TABLET, DELAYED RELEASE ORAL at 08:25

## 2020-01-01 RX ADMIN — ACETAMINOPHEN 650 MG: 325 TABLET, FILM COATED ORAL at 17:46

## 2020-01-01 RX ADMIN — POLYETHYLENE GLYCOL 3350 17 G: 17 POWDER, FOR SOLUTION ORAL at 10:05

## 2020-01-01 RX ADMIN — DONEPEZIL HYDROCHLORIDE 5 MG: 5 TABLET, FILM COATED ORAL at 21:51

## 2020-01-01 RX ADMIN — HALOPERIDOL LACTATE 2 MG: 5 INJECTION, SOLUTION INTRAMUSCULAR at 00:09

## 2020-01-01 RX ADMIN — METOPROLOL TARTRATE 75 MG: 50 TABLET, FILM COATED ORAL at 18:34

## 2020-01-01 RX ADMIN — INSULIN LISPRO 10 UNITS: 100 INJECTION, SOLUTION INTRAVENOUS; SUBCUTANEOUS at 12:08

## 2020-01-01 RX ADMIN — INSULIN LISPRO 3 UNITS: 100 INJECTION, SOLUTION INTRAVENOUS; SUBCUTANEOUS at 16:30

## 2020-01-01 RX ADMIN — Medication 10 ML: at 14:06

## 2020-01-01 RX ADMIN — AMLODIPINE BESYLATE 2.5 MG: 5 TABLET ORAL at 10:50

## 2020-01-01 RX ADMIN — AMLODIPINE BESYLATE 10 MG: 5 TABLET ORAL at 08:58

## 2020-01-01 RX ADMIN — Medication 10 ML: at 21:41

## 2020-01-01 RX ADMIN — HALOPERIDOL LACTATE 2 MG: 5 INJECTION, SOLUTION INTRAMUSCULAR at 01:46

## 2020-01-01 RX ADMIN — HYDRALAZINE HYDROCHLORIDE 10 MG: 20 INJECTION INTRAMUSCULAR; INTRAVENOUS at 23:18

## 2020-01-01 RX ADMIN — REGULAR STRENGTH 325 MG: 325 TABLET ORAL at 17:13

## 2020-01-01 RX ADMIN — LABETALOL HYDROCHLORIDE 20 MG: 5 INJECTION INTRAVENOUS at 05:27

## 2020-01-01 RX ADMIN — HYDRALAZINE HYDROCHLORIDE 10 MG: 20 INJECTION INTRAMUSCULAR; INTRAVENOUS at 03:45

## 2020-01-01 RX ADMIN — INSULIN LISPRO 3 UNITS: 100 INJECTION, SOLUTION INTRAVENOUS; SUBCUTANEOUS at 16:29

## 2020-01-01 RX ADMIN — AMPICILLIN SODIUM 500 MG: 500 INJECTION, POWDER, FOR SOLUTION INTRAMUSCULAR; INTRAVENOUS at 12:10

## 2020-01-01 RX ADMIN — HEPARIN SODIUM 5000 UNITS: 5000 INJECTION INTRAVENOUS; SUBCUTANEOUS at 18:35

## 2020-01-01 RX ADMIN — OXYCODONE 5 MG: 5 TABLET ORAL at 21:18

## 2020-01-01 RX ADMIN — CEFEPIME HYDROCHLORIDE 1 G: 1 INJECTION, POWDER, FOR SOLUTION INTRAMUSCULAR; INTRAVENOUS at 21:41

## 2020-01-01 RX ADMIN — AMLODIPINE BESYLATE 10 MG: 5 TABLET ORAL at 09:55

## 2020-01-01 RX ADMIN — AMLODIPINE BESYLATE 10 MG: 5 TABLET ORAL at 10:26

## 2020-01-01 RX ADMIN — ATORVASTATIN CALCIUM 40 MG: 40 TABLET, FILM COATED ORAL at 09:09

## 2020-01-01 RX ADMIN — MORPHINE SULFATE 2 MG: 10 INJECTION INTRAVENOUS at 21:15

## 2020-01-01 RX ADMIN — Medication 10 ML: at 21:50

## 2020-01-01 RX ADMIN — AMPICILLIN SODIUM 500 MG: 500 INJECTION, POWDER, FOR SOLUTION INTRAMUSCULAR; INTRAVENOUS at 04:02

## 2020-01-01 RX ADMIN — POLYETHYLENE GLYCOL 3350 17 G: 17 POWDER, FOR SOLUTION ORAL at 09:23

## 2020-01-01 RX ADMIN — OXYCODONE 5 MG: 5 TABLET ORAL at 09:08

## 2020-01-01 RX ADMIN — Medication 1 TABLET: at 09:22

## 2020-01-01 RX ADMIN — QUETIAPINE FUMARATE 25 MG: 25 TABLET ORAL at 22:09

## 2020-01-01 RX ADMIN — INSULIN LISPRO 2 UNITS: 100 INJECTION, SOLUTION INTRAVENOUS; SUBCUTANEOUS at 21:19

## 2020-01-01 RX ADMIN — Medication 10 ML: at 13:12

## 2020-01-01 RX ADMIN — METRONIDAZOLE 500 MG: 500 INJECTION, SOLUTION INTRAVENOUS at 11:51

## 2020-01-01 RX ADMIN — AMPICILLIN SODIUM 500 MG: 500 INJECTION, POWDER, FOR SOLUTION INTRAMUSCULAR; INTRAVENOUS at 21:49

## 2020-01-01 RX ADMIN — Medication 1 TABLET: at 11:40

## 2020-01-01 RX ADMIN — PANTOPRAZOLE SODIUM 40 MG: 40 TABLET, DELAYED RELEASE ORAL at 09:10

## 2020-01-01 RX ADMIN — REGULAR STRENGTH 325 MG: 325 TABLET ORAL at 10:14

## 2020-01-01 RX ADMIN — CEFEPIME HYDROCHLORIDE 1 G: 1 INJECTION, POWDER, FOR SOLUTION INTRAMUSCULAR; INTRAVENOUS at 05:19

## 2020-01-01 RX ADMIN — INSULIN GLARGINE 5 UNITS: 100 INJECTION, SOLUTION SUBCUTANEOUS at 22:01

## 2020-01-01 RX ADMIN — CEFEPIME HYDROCHLORIDE 1 G: 1 INJECTION, POWDER, FOR SOLUTION INTRAMUSCULAR; INTRAVENOUS at 04:30

## 2020-01-01 RX ADMIN — ACETAMINOPHEN 650 MG: 325 TABLET, FILM COATED ORAL at 17:02

## 2020-01-01 RX ADMIN — Medication 1 TABLET: at 16:56

## 2020-01-01 RX ADMIN — FENTANYL CITRATE 25 MCG: 50 INJECTION INTRAMUSCULAR; INTRAVENOUS at 21:10

## 2020-01-01 RX ADMIN — HYDRALAZINE HYDROCHLORIDE 10 MG: 20 INJECTION INTRAMUSCULAR; INTRAVENOUS at 18:34

## 2020-01-01 RX ADMIN — Medication 10 ML: at 06:37

## 2020-01-01 RX ADMIN — ALBUMIN (HUMAN) 250 ML: 2.5 SOLUTION INTRAVENOUS at 19:55

## 2020-01-01 RX ADMIN — ATORVASTATIN CALCIUM 40 MG: 40 TABLET, FILM COATED ORAL at 10:15

## 2020-01-01 RX ADMIN — Medication 20 ML: at 04:39

## 2020-01-01 RX ADMIN — INSULIN LISPRO 2 UNITS: 100 INJECTION, SOLUTION INTRAVENOUS; SUBCUTANEOUS at 12:51

## 2020-01-01 RX ADMIN — AMPICILLIN SODIUM 500 MG: 500 INJECTION, POWDER, FOR SOLUTION INTRAMUSCULAR; INTRAVENOUS at 21:04

## 2020-01-01 RX ADMIN — Medication 10 ML: at 22:18

## 2020-01-01 RX ADMIN — METRONIDAZOLE 500 MG: 500 INJECTION, SOLUTION INTRAVENOUS at 12:04

## 2020-01-01 RX ADMIN — Medication 10 ML: at 15:42

## 2020-01-01 RX ADMIN — AMPICILLIN SODIUM 500 MG: 500 INJECTION, POWDER, FOR SOLUTION INTRAMUSCULAR; INTRAVENOUS at 10:54

## 2020-01-01 RX ADMIN — ACETAMINOPHEN 650 MG: 325 TABLET, FILM COATED ORAL at 10:15

## 2020-01-01 RX ADMIN — SODIUM CHLORIDE 40 MG: 9 INJECTION INTRAMUSCULAR; INTRAVENOUS; SUBCUTANEOUS at 10:22

## 2020-01-01 RX ADMIN — Medication 10 ML: at 22:03

## 2020-01-01 RX ADMIN — PIPERACILLIN AND TAZOBACTAM 3.38 G: 3; .375 INJECTION, POWDER, LYOPHILIZED, FOR SOLUTION INTRAVENOUS at 15:50

## 2020-01-01 RX ADMIN — METOPROLOL TARTRATE 75 MG: 50 TABLET, FILM COATED ORAL at 17:26

## 2020-01-01 RX ADMIN — REGULAR STRENGTH 325 MG: 325 TABLET ORAL at 10:25

## 2020-01-01 RX ADMIN — Medication 10 ML: at 14:50

## 2020-01-01 RX ADMIN — Medication 10 ML: at 05:34

## 2020-01-01 RX ADMIN — LIDOCAINE HYDROCHLORIDE 1 G: 10 INJECTION, SOLUTION EPIDURAL; INFILTRATION; INTRACAUDAL; PERINEURAL at 02:33

## 2020-01-01 RX ADMIN — ACETAMINOPHEN 650 MG: 650 SUPPOSITORY RECTAL at 16:43

## 2020-01-01 RX ADMIN — ZINC SULFATE 220 MG (50 MG) CAPSULE 1 CAPSULE: CAPSULE at 09:00

## 2020-01-01 RX ADMIN — HYDRALAZINE HYDROCHLORIDE 10 MG: 20 INJECTION INTRAMUSCULAR; INTRAVENOUS at 08:28

## 2020-01-01 RX ADMIN — Medication 1 TABLET: at 12:10

## 2020-01-01 RX ADMIN — INSULIN LISPRO 2 UNITS: 100 INJECTION, SOLUTION INTRAVENOUS; SUBCUTANEOUS at 11:42

## 2020-01-01 RX ADMIN — Medication 1 TABLET: at 08:58

## 2020-01-01 RX ADMIN — FUROSEMIDE 20 MG: 20 TABLET ORAL at 10:45

## 2020-01-01 RX ADMIN — INSULIN LISPRO 5 UNITS: 100 INJECTION, SOLUTION INTRAVENOUS; SUBCUTANEOUS at 08:33

## 2020-01-01 RX ADMIN — REGULAR STRENGTH 325 MG: 325 TABLET ORAL at 17:22

## 2020-01-01 RX ADMIN — Medication 1 TABLET: at 18:41

## 2020-01-01 RX ADMIN — FENTANYL CITRATE 25 MCG: 50 INJECTION, SOLUTION INTRAMUSCULAR; INTRAVENOUS at 20:10

## 2020-01-01 RX ADMIN — Medication 1 TABLET: at 17:22

## 2020-01-01 RX ADMIN — Medication 10 ML: at 23:44

## 2020-01-01 RX ADMIN — ACETAMINOPHEN 650 MG: 325 TABLET, FILM COATED ORAL at 23:57

## 2020-01-01 RX ADMIN — WATER 2 G: 1 INJECTION INTRAMUSCULAR; INTRAVENOUS; SUBCUTANEOUS at 09:07

## 2020-01-01 RX ADMIN — ACETAMINOPHEN 650 MG: 325 TABLET, FILM COATED ORAL at 05:47

## 2020-01-01 RX ADMIN — SODIUM CHLORIDE 40 MG: 9 INJECTION INTRAMUSCULAR; INTRAVENOUS; SUBCUTANEOUS at 08:55

## 2020-01-01 RX ADMIN — INSULIN LISPRO 5 UNITS: 100 INJECTION, SOLUTION INTRAVENOUS; SUBCUTANEOUS at 17:46

## 2020-01-01 RX ADMIN — REGULAR STRENGTH 325 MG: 325 TABLET ORAL at 09:09

## 2020-01-01 RX ADMIN — Medication 80 MCG: at 19:26

## 2020-01-01 RX ADMIN — HEPARIN SODIUM 5000 UNITS: 5000 INJECTION INTRAVENOUS; SUBCUTANEOUS at 02:33

## 2020-01-01 RX ADMIN — ACETAMINOPHEN 650 MG: 325 TABLET, FILM COATED ORAL at 00:00

## 2020-01-01 RX ADMIN — ACETAMINOPHEN 650 MG: 325 TABLET, FILM COATED ORAL at 17:34

## 2020-01-01 RX ADMIN — REGULAR STRENGTH 325 MG: 325 TABLET ORAL at 08:54

## 2020-01-01 RX ADMIN — ATORVASTATIN CALCIUM 40 MG: 40 TABLET, FILM COATED ORAL at 08:58

## 2020-01-01 RX ADMIN — INSULIN GLARGINE 5 UNITS: 100 INJECTION, SOLUTION SUBCUTANEOUS at 22:34

## 2020-01-01 RX ADMIN — Medication 10 ML: at 06:51

## 2020-01-01 RX ADMIN — METOPROLOL TARTRATE 75 MG: 50 TABLET, FILM COATED ORAL at 10:26

## 2020-01-01 RX ADMIN — INSULIN LISPRO 3 UNITS: 100 INJECTION, SOLUTION INTRAVENOUS; SUBCUTANEOUS at 12:09

## 2020-01-01 RX ADMIN — Medication 10 ML: at 05:06

## 2020-01-01 RX ADMIN — QUETIAPINE FUMARATE 25 MG: 25 TABLET ORAL at 22:01

## 2020-01-01 RX ADMIN — METOPROLOL TARTRATE 2.5 MG: 5 INJECTION INTRAVENOUS at 00:35

## 2020-01-01 RX ADMIN — METRONIDAZOLE 500 MG: 500 INJECTION, SOLUTION INTRAVENOUS at 00:00

## 2020-01-01 RX ADMIN — ALBUTEROL SULFATE 2.5 MG: 2.5 SOLUTION RESPIRATORY (INHALATION) at 19:55

## 2020-01-01 RX ADMIN — ACETAMINOPHEN 650 MG: 325 TABLET, FILM COATED ORAL at 00:01

## 2020-01-01 RX ADMIN — METOPROLOL TARTRATE 75 MG: 50 TABLET, FILM COATED ORAL at 17:02

## 2020-01-01 RX ADMIN — POTASSIUM CHLORIDE 10 MEQ: 7.46 INJECTION, SOLUTION INTRAVENOUS at 08:33

## 2020-01-01 RX ADMIN — OXYCODONE 5 MG: 5 TABLET ORAL at 19:46

## 2020-01-01 RX ADMIN — FUROSEMIDE 40 MG: 40 TABLET ORAL at 09:23

## 2020-01-01 RX ADMIN — QUETIAPINE FUMARATE 25 MG: 25 TABLET ORAL at 21:36

## 2020-01-01 RX ADMIN — Medication 1 TABLET: at 17:12

## 2020-01-01 RX ADMIN — POTASSIUM CHLORIDE 10 MEQ: 7.46 INJECTION, SOLUTION INTRAVENOUS at 11:25

## 2020-01-01 RX ADMIN — METOPROLOL TARTRATE 75 MG: 50 TABLET, FILM COATED ORAL at 08:25

## 2020-01-01 RX ADMIN — Medication 1 TABLET: at 16:28

## 2020-01-01 RX ADMIN — INSULIN LISPRO 7 UNITS: 100 INJECTION, SOLUTION INTRAVENOUS; SUBCUTANEOUS at 09:04

## 2020-01-01 RX ADMIN — HEPARIN SODIUM 5000 UNITS: 5000 INJECTION INTRAVENOUS; SUBCUTANEOUS at 00:00

## 2020-01-01 RX ADMIN — INSULIN GLARGINE 5 UNITS: 100 INJECTION, SOLUTION SUBCUTANEOUS at 22:00

## 2020-01-01 RX ADMIN — CEFAZOLIN 2 G: 1 INJECTION, POWDER, FOR SOLUTION INTRAMUSCULAR; INTRAVENOUS; PARENTERAL at 19:40

## 2020-01-01 RX ADMIN — Medication 10 ML: at 05:28

## 2020-01-01 RX ADMIN — Medication 10 ML: at 21:04

## 2020-01-01 RX ADMIN — INSULIN LISPRO 7 UNITS: 100 INJECTION, SOLUTION INTRAVENOUS; SUBCUTANEOUS at 12:03

## 2020-01-01 RX ADMIN — ACETAMINOPHEN 650 MG: 325 TABLET, FILM COATED ORAL at 06:36

## 2020-01-01 RX ADMIN — FUROSEMIDE 40 MG: 40 TABLET ORAL at 09:10

## 2020-01-01 RX ADMIN — Medication 10 ML: at 13:55

## 2020-01-01 RX ADMIN — METOPROLOL TARTRATE 75 MG: 50 TABLET, FILM COATED ORAL at 10:06

## 2020-01-01 RX ADMIN — AMPICILLIN SODIUM 500 MG: 500 INJECTION, POWDER, FOR SOLUTION INTRAMUSCULAR; INTRAVENOUS at 03:53

## 2020-01-01 RX ADMIN — ACETAMINOPHEN 650 MG: 325 TABLET, FILM COATED ORAL at 12:11

## 2020-01-01 RX ADMIN — Medication 10 ML: at 05:48

## 2020-01-01 RX ADMIN — OXYCODONE 5 MG: 5 TABLET ORAL at 00:34

## 2020-01-01 RX ADMIN — PANTOPRAZOLE SODIUM 40 MG: 40 TABLET, DELAYED RELEASE ORAL at 10:06

## 2020-01-01 RX ADMIN — Medication 1 TABLET: at 12:52

## 2020-01-01 RX ADMIN — Medication 10 ML: at 00:51

## 2020-01-01 RX ADMIN — INSULIN LISPRO 3 UNITS: 100 INJECTION, SOLUTION INTRAVENOUS; SUBCUTANEOUS at 22:38

## 2020-01-01 RX ADMIN — FUROSEMIDE 20 MG: 20 TABLET ORAL at 08:58

## 2020-01-01 RX ADMIN — Medication 10 ML: at 05:39

## 2020-01-01 RX ADMIN — Medication 10 ML: at 21:17

## 2020-01-01 RX ADMIN — POLYETHYLENE GLYCOL 3350 17 G: 17 POWDER, FOR SOLUTION ORAL at 10:14

## 2020-01-01 RX ADMIN — INSULIN LISPRO 5 UNITS: 100 INJECTION, SOLUTION INTRAVENOUS; SUBCUTANEOUS at 11:33

## 2020-01-01 RX ADMIN — CEFEPIME HYDROCHLORIDE 1 G: 1 INJECTION, POWDER, FOR SOLUTION INTRAMUSCULAR; INTRAVENOUS at 15:10

## 2020-01-01 RX ADMIN — OXYCODONE 5 MG: 5 TABLET ORAL at 15:18

## 2020-01-01 RX ADMIN — Medication 10 ML: at 05:49

## 2020-01-01 RX ADMIN — METRONIDAZOLE 500 MG: 500 INJECTION, SOLUTION INTRAVENOUS at 23:27

## 2020-01-01 RX ADMIN — POLYETHYLENE GLYCOL 3350 17 G: 17 POWDER, FOR SOLUTION ORAL at 09:04

## 2020-01-01 RX ADMIN — Medication 10 ML: at 11:48

## 2020-01-01 RX ADMIN — AMPICILLIN SODIUM 500 MG: 500 INJECTION, POWDER, FOR SOLUTION INTRAMUSCULAR; INTRAVENOUS at 21:15

## 2020-01-01 RX ADMIN — SODIUM CHLORIDE 40 MG: 9 INJECTION INTRAMUSCULAR; INTRAVENOUS; SUBCUTANEOUS at 12:31

## 2020-01-01 RX ADMIN — REGULAR STRENGTH 325 MG: 325 TABLET ORAL at 17:28

## 2020-01-01 RX ADMIN — HYDRALAZINE HYDROCHLORIDE 10 MG: 20 INJECTION INTRAMUSCULAR; INTRAVENOUS at 23:51

## 2020-01-01 RX ADMIN — DOCUSATE SODIUM - SENNOSIDES 2 TABLET: 50; 8.6 TABLET, FILM COATED ORAL at 10:23

## 2020-01-01 RX ADMIN — Medication 10 ML: at 06:14

## 2020-01-01 RX ADMIN — REGULAR STRENGTH 325 MG: 325 TABLET ORAL at 17:14

## 2020-01-01 RX ADMIN — Medication 10 ML: at 06:05

## 2020-01-01 RX ADMIN — ROCURONIUM BROMIDE 50 MG: 10 INJECTION INTRAVENOUS at 19:15

## 2020-01-01 RX ADMIN — POLYETHYLENE GLYCOL 3350 17 G: 17 POWDER, FOR SOLUTION ORAL at 09:08

## 2020-01-01 RX ADMIN — ACETAMINOPHEN 650 MG: 325 TABLET, FILM COATED ORAL at 06:43

## 2020-01-01 RX ADMIN — OXYCODONE 5 MG: 5 TABLET ORAL at 00:35

## 2020-01-01 RX ADMIN — AMLODIPINE BESYLATE 10 MG: 5 TABLET ORAL at 10:06

## 2020-01-01 RX ADMIN — CEFEPIME HYDROCHLORIDE 1 G: 1 INJECTION, POWDER, FOR SOLUTION INTRAMUSCULAR; INTRAVENOUS at 21:00

## 2020-01-01 RX ADMIN — Medication 10 ML: at 13:13

## 2020-01-01 RX ADMIN — POLYETHYLENE GLYCOL 3350 17 G: 17 POWDER, FOR SOLUTION ORAL at 10:22

## 2020-01-01 RX ADMIN — DOCUSATE SODIUM - SENNOSIDES 2 TABLET: 50; 8.6 TABLET, FILM COATED ORAL at 09:21

## 2020-01-01 RX ADMIN — SODIUM CHLORIDE 40 MG: 9 INJECTION INTRAMUSCULAR; INTRAVENOUS; SUBCUTANEOUS at 10:13

## 2020-01-01 RX ADMIN — INSULIN LISPRO 3 UNITS: 100 INJECTION, SOLUTION INTRAVENOUS; SUBCUTANEOUS at 12:58

## 2020-01-01 RX ADMIN — MORPHINE SULFATE 2 MG: 10 INJECTION INTRAVENOUS at 21:20

## 2020-01-01 RX ADMIN — HYDRALAZINE HYDROCHLORIDE 10 MG: 20 INJECTION INTRAMUSCULAR; INTRAVENOUS at 10:09

## 2020-01-01 RX ADMIN — INSULIN LISPRO 3 UNITS: 100 INJECTION, SOLUTION INTRAVENOUS; SUBCUTANEOUS at 17:34

## 2020-01-01 RX ADMIN — FUROSEMIDE 40 MG: 40 TABLET ORAL at 10:23

## 2020-01-01 RX ADMIN — REGULAR STRENGTH 325 MG: 325 TABLET ORAL at 08:57

## 2020-01-01 RX ADMIN — OXYCODONE 5 MG: 5 TABLET ORAL at 20:36

## 2020-01-01 RX ADMIN — HYDRALAZINE HYDROCHLORIDE 10 MG: 20 INJECTION INTRAMUSCULAR; INTRAVENOUS at 19:37

## 2020-01-01 RX ADMIN — ACETAMINOPHEN 650 MG: 325 TABLET, FILM COATED ORAL at 06:50

## 2020-01-01 RX ADMIN — POTASSIUM CHLORIDE 10 MEQ: 7.46 INJECTION, SOLUTION INTRAVENOUS at 09:42

## 2020-01-01 RX ADMIN — Medication 1 TABLET: at 08:53

## 2020-01-01 RX ADMIN — METOPROLOL TARTRATE 75 MG: 50 TABLET, FILM COATED ORAL at 09:48

## 2020-01-01 RX ADMIN — ACETAMINOPHEN 650 MG: 325 TABLET, FILM COATED ORAL at 13:10

## 2020-01-01 RX ADMIN — DOCUSATE SODIUM - SENNOSIDES 2 TABLET: 50; 8.6 TABLET, FILM COATED ORAL at 09:54

## 2020-01-01 RX ADMIN — METOPROLOL TARTRATE 2.5 MG: 5 INJECTION INTRAVENOUS at 17:29

## 2020-01-01 RX ADMIN — DOCUSATE SODIUM - SENNOSIDES 2 TABLET: 50; 8.6 TABLET, FILM COATED ORAL at 18:34

## 2020-01-01 RX ADMIN — DONEPEZIL HYDROCHLORIDE 5 MG: 5 TABLET, FILM COATED ORAL at 23:00

## 2020-01-01 RX ADMIN — ATORVASTATIN CALCIUM 40 MG: 40 TABLET, FILM COATED ORAL at 09:22

## 2020-01-01 RX ADMIN — METOPROLOL TARTRATE 75 MG: 50 TABLET, FILM COATED ORAL at 17:22

## 2020-01-01 RX ADMIN — QUETIAPINE FUMARATE 25 MG: 25 TABLET ORAL at 22:18

## 2020-01-01 RX ADMIN — INSULIN LISPRO 3 UNITS: 100 INJECTION, SOLUTION INTRAVENOUS; SUBCUTANEOUS at 12:16

## 2020-01-01 RX ADMIN — INSULIN LISPRO 3 UNITS: 100 INJECTION, SOLUTION INTRAVENOUS; SUBCUTANEOUS at 17:14

## 2020-01-01 RX ADMIN — ACETAMINOPHEN 650 MG: 325 TABLET, FILM COATED ORAL at 05:37

## 2020-01-01 RX ADMIN — SODIUM CHLORIDE 40 MG: 9 INJECTION INTRAMUSCULAR; INTRAVENOUS; SUBCUTANEOUS at 09:37

## 2020-01-01 RX ADMIN — INSULIN LISPRO 2 UNITS: 100 INJECTION, SOLUTION INTRAVENOUS; SUBCUTANEOUS at 16:47

## 2020-01-01 RX ADMIN — Medication 20 ML: at 20:20

## 2020-01-01 RX ADMIN — INSULIN GLARGINE 5 UNITS: 100 INJECTION, SOLUTION SUBCUTANEOUS at 21:50

## 2020-01-01 RX ADMIN — INSULIN LISPRO 3 UNITS: 100 INJECTION, SOLUTION INTRAVENOUS; SUBCUTANEOUS at 08:54

## 2020-01-01 RX ADMIN — ONDANSETRON 4 MG: 2 INJECTION INTRAMUSCULAR; INTRAVENOUS at 22:26

## 2020-01-01 RX ADMIN — LABETALOL HYDROCHLORIDE 20 MG: 5 INJECTION INTRAVENOUS at 12:32

## 2020-01-01 RX ADMIN — ACETAMINOPHEN 650 MG: 325 TABLET, FILM COATED ORAL at 05:39

## 2020-01-01 RX ADMIN — PIPERACILLIN AND TAZOBACTAM 3.38 G: 3; .375 INJECTION, POWDER, LYOPHILIZED, FOR SOLUTION INTRAVENOUS at 15:02

## 2020-01-01 RX ADMIN — Medication 10 ML: at 05:51

## 2020-01-01 RX ADMIN — INSULIN LISPRO 3 UNITS: 100 INJECTION, SOLUTION INTRAVENOUS; SUBCUTANEOUS at 17:35

## 2020-01-01 RX ADMIN — REGULAR STRENGTH 325 MG: 325 TABLET ORAL at 17:03

## 2020-01-01 RX ADMIN — FUROSEMIDE 40 MG: 40 TABLET ORAL at 10:06

## 2020-01-01 RX ADMIN — FENTANYL CITRATE 25 MCG: 50 INJECTION INTRAMUSCULAR; INTRAVENOUS at 05:40

## 2020-01-01 RX ADMIN — OXYCODONE 5 MG: 5 TABLET ORAL at 09:09

## 2020-01-01 RX ADMIN — MORPHINE SULFATE 2 MG: 10 INJECTION INTRAVENOUS at 21:36

## 2020-01-01 RX ADMIN — TRAMADOL HYDROCHLORIDE 50 MG: 50 TABLET, FILM COATED ORAL at 02:32

## 2020-01-01 RX ADMIN — HEPARIN SODIUM 5000 UNITS: 5000 INJECTION INTRAVENOUS; SUBCUTANEOUS at 10:07

## 2020-01-01 RX ADMIN — METOPROLOL TARTRATE 75 MG: 50 TABLET, FILM COATED ORAL at 10:16

## 2020-01-01 RX ADMIN — AMLODIPINE BESYLATE 10 MG: 5 TABLET ORAL at 09:22

## 2020-01-01 RX ADMIN — ACETAMINOPHEN 650 MG: 325 TABLET, FILM COATED ORAL at 11:52

## 2020-01-01 RX ADMIN — INSULIN LISPRO 2 UNITS: 100 INJECTION, SOLUTION INTRAVENOUS; SUBCUTANEOUS at 08:55

## 2020-01-01 RX ADMIN — INSULIN LISPRO 7 UNITS: 100 INJECTION, SOLUTION INTRAVENOUS; SUBCUTANEOUS at 16:05

## 2020-01-01 RX ADMIN — FENTANYL CITRATE 25 MCG: 50 INJECTION INTRAMUSCULAR; INTRAVENOUS at 09:28

## 2020-01-01 RX ADMIN — Medication 1 TABLET: at 17:48

## 2020-01-01 RX ADMIN — CEFEPIME HYDROCHLORIDE 1 G: 1 INJECTION, POWDER, FOR SOLUTION INTRAMUSCULAR; INTRAVENOUS at 04:56

## 2020-01-01 RX ADMIN — DEXTROSE MONOHYDRATE 12.5 G: 25 INJECTION, SOLUTION INTRAVENOUS at 15:52

## 2020-01-01 RX ADMIN — INSULIN LISPRO 2 UNITS: 100 INJECTION, SOLUTION INTRAVENOUS; SUBCUTANEOUS at 21:50

## 2020-01-01 RX ADMIN — ALBUTEROL SULFATE 2.5 MG: 2.5 SOLUTION RESPIRATORY (INHALATION) at 15:06

## 2020-01-01 RX ADMIN — METOPROLOL TARTRATE 75 MG: 50 TABLET, FILM COATED ORAL at 17:47

## 2020-01-01 RX ADMIN — Medication 10 ML: at 14:23

## 2020-01-01 RX ADMIN — POTASSIUM CHLORIDE 20 MEQ: 750 TABLET, FILM COATED, EXTENDED RELEASE ORAL at 11:42

## 2020-01-01 RX ADMIN — ATORVASTATIN CALCIUM 40 MG: 40 TABLET, FILM COATED ORAL at 09:47

## 2020-01-01 RX ADMIN — ACETAMINOPHEN 650 MG: 325 TABLET, FILM COATED ORAL at 10:27

## 2020-01-01 RX ADMIN — HYDRALAZINE HYDROCHLORIDE 10 MG: 20 INJECTION INTRAMUSCULAR; INTRAVENOUS at 00:09

## 2020-01-01 RX ADMIN — POTASSIUM CHLORIDE 10 MEQ: 7.46 INJECTION, SOLUTION INTRAVENOUS at 10:26

## 2020-01-01 RX ADMIN — AMPICILLIN SODIUM 500 MG: 500 INJECTION, POWDER, FOR SOLUTION INTRAMUSCULAR; INTRAVENOUS at 21:41

## 2020-01-01 RX ADMIN — POTASSIUM CHLORIDE 20 MEQ: 750 TABLET, FILM COATED, EXTENDED RELEASE ORAL at 10:24

## 2020-01-01 RX ADMIN — Medication 1 TABLET: at 09:48

## 2020-01-01 RX ADMIN — FUROSEMIDE 40 MG: 40 TABLET ORAL at 08:25

## 2020-01-01 RX ADMIN — LORAZEPAM 0.5 MG: 2 INJECTION INTRAMUSCULAR; INTRAVENOUS at 21:51

## 2020-01-01 RX ADMIN — Medication 10 ML: at 15:50

## 2020-01-01 RX ADMIN — HEPARIN SODIUM 5000 UNITS: 5000 INJECTION INTRAVENOUS; SUBCUTANEOUS at 01:00

## 2020-01-01 RX ADMIN — Medication 10 ML: at 06:00

## 2020-01-01 RX ADMIN — HEPARIN SODIUM 5000 UNITS: 5000 INJECTION INTRAVENOUS; SUBCUTANEOUS at 00:10

## 2020-01-01 RX ADMIN — Medication 30 ML: at 23:11

## 2020-01-01 RX ADMIN — INSULIN LISPRO 2 UNITS: 100 INJECTION, SOLUTION INTRAVENOUS; SUBCUTANEOUS at 21:37

## 2020-01-01 RX ADMIN — Medication 10 ML: at 15:47

## 2020-01-01 RX ADMIN — ACETAMINOPHEN 650 MG: 325 TABLET, FILM COATED ORAL at 11:48

## 2020-01-01 RX ADMIN — ACETAMINOPHEN 650 MG: 325 TABLET, FILM COATED ORAL at 05:50

## 2020-01-01 RX ADMIN — QUETIAPINE FUMARATE 25 MG: 25 TABLET ORAL at 22:10

## 2020-01-01 RX ADMIN — HEPARIN SODIUM 5000 UNITS: 5000 INJECTION INTRAVENOUS; SUBCUTANEOUS at 17:12

## 2020-01-01 RX ADMIN — POTASSIUM CHLORIDE 20 MEQ: 750 TABLET, FILM COATED, EXTENDED RELEASE ORAL at 22:10

## 2020-01-01 RX ADMIN — POLYETHYLENE GLYCOL 3350 17 G: 17 POWDER, FOR SOLUTION ORAL at 09:56

## 2020-01-01 RX ADMIN — REGULAR STRENGTH 325 MG: 325 TABLET ORAL at 17:21

## 2020-01-01 RX ADMIN — Medication 10 ML: at 15:10

## 2020-01-01 RX ADMIN — ZINC SULFATE 220 MG (50 MG) CAPSULE 1 CAPSULE: CAPSULE at 10:52

## 2020-01-01 RX ADMIN — Medication 10 ML: at 21:12

## 2020-01-01 RX ADMIN — REGULAR STRENGTH 325 MG: 325 TABLET ORAL at 17:24

## 2020-01-01 RX ADMIN — PIPERACILLIN AND TAZOBACTAM 3.38 G: 3; .375 INJECTION, POWDER, LYOPHILIZED, FOR SOLUTION INTRAVENOUS at 05:47

## 2020-01-01 RX ADMIN — QUETIAPINE FUMARATE 25 MG: 25 TABLET ORAL at 22:00

## 2020-01-01 RX ADMIN — ACETAMINOPHEN 650 MG: 325 TABLET, FILM COATED ORAL at 12:09

## 2020-01-01 RX ADMIN — INSULIN GLARGINE 10 UNITS: 100 INJECTION, SOLUTION SUBCUTANEOUS at 22:03

## 2020-01-01 RX ADMIN — AMPICILLIN SODIUM 500 MG: 500 INJECTION, POWDER, FOR SOLUTION INTRAMUSCULAR; INTRAVENOUS at 15:58

## 2020-01-01 RX ADMIN — INSULIN LISPRO 2 UNITS: 100 INJECTION, SOLUTION INTRAVENOUS; SUBCUTANEOUS at 22:18

## 2020-01-01 RX ADMIN — Medication 10 ML: at 12:10

## 2020-01-01 RX ADMIN — Medication 10 ML: at 22:11

## 2020-01-01 RX ADMIN — Medication 1 TABLET: at 10:06

## 2020-01-01 RX ADMIN — INSULIN LISPRO 3 UNITS: 100 INJECTION, SOLUTION INTRAVENOUS; SUBCUTANEOUS at 12:59

## 2020-01-01 RX ADMIN — INSULIN LISPRO 7 UNITS: 100 INJECTION, SOLUTION INTRAVENOUS; SUBCUTANEOUS at 13:11

## 2020-01-01 RX ADMIN — INSULIN LISPRO 3 UNITS: 100 INJECTION, SOLUTION INTRAVENOUS; SUBCUTANEOUS at 09:09

## 2020-01-01 RX ADMIN — Medication 10 ML: at 06:44

## 2020-01-01 RX ADMIN — INSULIN LISPRO 3 UNITS: 100 INJECTION, SOLUTION INTRAVENOUS; SUBCUTANEOUS at 11:58

## 2020-01-01 RX ADMIN — ATORVASTATIN CALCIUM 40 MG: 40 TABLET, FILM COATED ORAL at 08:44

## 2020-01-01 RX ADMIN — OXYCODONE HYDROCHLORIDE AND ACETAMINOPHEN 500 MG: 500 TABLET ORAL at 17:26

## 2020-01-01 RX ADMIN — ACETAMINOPHEN 650 MG: 325 TABLET, FILM COATED ORAL at 21:11

## 2020-01-01 RX ADMIN — PIPERACILLIN AND TAZOBACTAM 3.38 G: 3; .375 INJECTION, POWDER, LYOPHILIZED, FOR SOLUTION INTRAVENOUS at 14:23

## 2020-01-01 RX ADMIN — INSULIN LISPRO 3 UNITS: 100 INJECTION, SOLUTION INTRAVENOUS; SUBCUTANEOUS at 16:43

## 2020-01-01 RX ADMIN — OXYCODONE HYDROCHLORIDE AND ACETAMINOPHEN 500 MG: 500 TABLET ORAL at 09:09

## 2020-01-01 RX ADMIN — PIPERACILLIN AND TAZOBACTAM 3.38 G: 3; .375 INJECTION, POWDER, LYOPHILIZED, FOR SOLUTION INTRAVENOUS at 22:43

## 2020-01-01 RX ADMIN — ACETAMINOPHEN 650 MG: 325 TABLET, FILM COATED ORAL at 00:50

## 2020-01-01 RX ADMIN — REGULAR STRENGTH 325 MG: 325 TABLET ORAL at 09:20

## 2020-01-01 RX ADMIN — MORPHINE SULFATE 1 MG: 2 INJECTION, SOLUTION INTRAMUSCULAR; INTRAVENOUS at 22:26

## 2020-01-01 RX ADMIN — INSULIN LISPRO 2 UNITS: 100 INJECTION, SOLUTION INTRAVENOUS; SUBCUTANEOUS at 16:59

## 2020-01-01 RX ADMIN — SODIUM CHLORIDE 75 ML/HR: 900 INJECTION, SOLUTION INTRAVENOUS at 21:50

## 2020-01-01 RX ADMIN — ALBUTEROL SULFATE 2.5 MG: 2.5 SOLUTION RESPIRATORY (INHALATION) at 03:01

## 2020-01-01 RX ADMIN — METOPROLOL TARTRATE 75 MG: 50 TABLET, FILM COATED ORAL at 08:57

## 2020-01-01 RX ADMIN — PIPERACILLIN AND TAZOBACTAM 3.38 G: 3; .375 INJECTION, POWDER, LYOPHILIZED, FOR SOLUTION INTRAVENOUS at 05:38

## 2020-01-01 RX ADMIN — ACETAMINOPHEN 650 MG: 325 TABLET, FILM COATED ORAL at 00:09

## 2020-01-01 RX ADMIN — ALBUTEROL SULFATE 2.5 MG: 2.5 SOLUTION RESPIRATORY (INHALATION) at 02:37

## 2020-01-01 RX ADMIN — SODIUM CHLORIDE 75 ML/HR: 900 INJECTION, SOLUTION INTRAVENOUS at 02:57

## 2020-01-01 RX ADMIN — AMPICILLIN SODIUM 500 MG: 500 INJECTION, POWDER, FOR SOLUTION INTRAMUSCULAR; INTRAVENOUS at 03:13

## 2020-01-01 RX ADMIN — Medication 3 MG: at 20:31

## 2020-01-01 RX ADMIN — CEFEPIME HYDROCHLORIDE 1 G: 1 INJECTION, POWDER, FOR SOLUTION INTRAMUSCULAR; INTRAVENOUS at 15:45

## 2020-01-01 RX ADMIN — Medication 1 TABLET: at 09:55

## 2020-01-01 RX ADMIN — INSULIN LISPRO 3 UNITS: 100 INJECTION, SOLUTION INTRAVENOUS; SUBCUTANEOUS at 22:03

## 2020-01-01 RX ADMIN — METOPROLOL TARTRATE 75 MG: 50 TABLET, FILM COATED ORAL at 09:22

## 2020-01-01 RX ADMIN — Medication 10 ML: at 14:00

## 2020-01-01 RX ADMIN — METOPROLOL TARTRATE 2.5 MG: 5 INJECTION INTRAVENOUS at 05:10

## 2020-01-01 RX ADMIN — REGULAR STRENGTH 325 MG: 325 TABLET ORAL at 09:00

## 2020-01-01 RX ADMIN — Medication 1 TABLET: at 10:28

## 2020-01-01 RX ADMIN — Medication 10 ML: at 00:50

## 2020-01-01 RX ADMIN — SUCCINYLCHOLINE CHLORIDE 160 MG: 20 INJECTION, SOLUTION INTRAMUSCULAR; INTRAVENOUS at 19:15

## 2020-01-01 RX ADMIN — FUROSEMIDE 40 MG: 10 INJECTION, SOLUTION INTRAMUSCULAR; INTRAVENOUS at 10:49

## 2020-01-01 RX ADMIN — ALBUTEROL SULFATE 2.5 MG: 2.5 SOLUTION RESPIRATORY (INHALATION) at 14:45

## 2020-01-01 RX ADMIN — SODIUM CHLORIDE 125 ML/HR: 900 INJECTION, SOLUTION INTRAVENOUS at 20:14

## 2020-01-01 RX ADMIN — POTASSIUM CHLORIDE 20 MEQ: 750 TABLET, FILM COATED, EXTENDED RELEASE ORAL at 09:21

## 2020-01-01 RX ADMIN — HEPARIN SODIUM 5000 UNITS: 5000 INJECTION INTRAVENOUS; SUBCUTANEOUS at 16:56

## 2020-01-01 RX ADMIN — INSULIN LISPRO 3 UNITS: 100 INJECTION, SOLUTION INTRAVENOUS; SUBCUTANEOUS at 11:51

## 2020-01-01 RX ADMIN — ACETAMINOPHEN 650 MG: 325 TABLET, FILM COATED ORAL at 17:47

## 2020-01-01 RX ADMIN — REGULAR STRENGTH 325 MG: 325 TABLET ORAL at 17:47

## 2020-01-01 RX ADMIN — Medication 10 ML: at 14:28

## 2020-01-01 RX ADMIN — INSULIN LISPRO 3 UNITS: 100 INJECTION, SOLUTION INTRAVENOUS; SUBCUTANEOUS at 08:56

## 2020-01-01 RX ADMIN — LABETALOL HYDROCHLORIDE 20 MG: 5 INJECTION INTRAVENOUS at 04:09

## 2020-01-01 RX ADMIN — Medication 1 TABLET: at 10:49

## 2020-01-01 RX ADMIN — INSULIN GLARGINE 5 UNITS: 100 INJECTION, SOLUTION SUBCUTANEOUS at 22:44

## 2020-01-01 RX ADMIN — INSULIN GLARGINE 5 UNITS: 100 INJECTION, SOLUTION SUBCUTANEOUS at 22:37

## 2020-01-01 RX ADMIN — ACETAMINOPHEN 650 MG: 325 TABLET, FILM COATED ORAL at 23:51

## 2020-01-01 RX ADMIN — AMPICILLIN SODIUM 500 MG: 500 INJECTION, POWDER, FOR SOLUTION INTRAMUSCULAR; INTRAVENOUS at 11:58

## 2020-01-01 RX ADMIN — DOCUSATE SODIUM - SENNOSIDES 2 TABLET: 50; 8.6 TABLET, FILM COATED ORAL at 08:53

## 2020-01-01 RX ADMIN — Medication 1 TABLET: at 12:09

## 2020-01-01 RX ADMIN — Medication 10 ML: at 22:02

## 2020-01-01 RX ADMIN — Medication 10 ML: at 14:24

## 2020-01-01 RX ADMIN — DOCUSATE SODIUM - SENNOSIDES 2 TABLET: 50; 8.6 TABLET, FILM COATED ORAL at 17:46

## 2020-01-01 RX ADMIN — METRONIDAZOLE 500 MG: 500 INJECTION, SOLUTION INTRAVENOUS at 12:16

## 2020-01-01 RX ADMIN — Medication 1 TABLET: at 09:10

## 2020-01-01 RX ADMIN — REGULAR STRENGTH 325 MG: 325 TABLET ORAL at 10:06

## 2020-01-01 RX ADMIN — SODIUM CHLORIDE 125 ML/HR: 900 INJECTION, SOLUTION INTRAVENOUS at 21:09

## 2020-01-01 RX ADMIN — ATORVASTATIN CALCIUM 40 MG: 40 TABLET, FILM COATED ORAL at 10:26

## 2020-01-01 RX ADMIN — POTASSIUM CHLORIDE 10 MEQ: 7.46 INJECTION, SOLUTION INTRAVENOUS at 11:51

## 2020-01-01 RX ADMIN — Medication 10 ML: at 15:07

## 2020-01-01 RX ADMIN — METOPROLOL TARTRATE 75 MG: 50 TABLET, FILM COATED ORAL at 09:55

## 2020-01-01 RX ADMIN — Medication 1 TABLET: at 11:48

## 2020-01-01 RX ADMIN — ETOMIDATE 20 MG: 2 INJECTION, SOLUTION INTRAVENOUS at 19:15

## 2020-01-01 RX ADMIN — AMLODIPINE BESYLATE 10 MG: 5 TABLET ORAL at 09:48

## 2020-01-01 RX ADMIN — Medication 1 TABLET: at 13:10

## 2020-01-01 RX ADMIN — ALBUTEROL SULFATE 2.5 MG: 2.5 SOLUTION RESPIRATORY (INHALATION) at 08:26

## 2020-01-01 RX ADMIN — INSULIN LISPRO 5 UNITS: 100 INJECTION, SOLUTION INTRAVENOUS; SUBCUTANEOUS at 07:30

## 2020-01-01 RX ADMIN — HEPARIN SODIUM 5000 UNITS: 5000 INJECTION INTRAVENOUS; SUBCUTANEOUS at 16:28

## 2020-01-01 RX ADMIN — AMPICILLIN SODIUM 500 MG: 500 INJECTION, POWDER, FOR SOLUTION INTRAMUSCULAR; INTRAVENOUS at 17:48

## 2020-01-01 RX ADMIN — DONEPEZIL HYDROCHLORIDE 5 MG: 5 TABLET, FILM COATED ORAL at 02:34

## 2020-01-01 RX ADMIN — FENTANYL CITRATE 25 MCG: 50 INJECTION INTRAMUSCULAR; INTRAVENOUS at 13:00

## 2020-01-01 RX ADMIN — Medication 1 TABLET: at 17:33

## 2020-01-01 RX ADMIN — METOPROLOL TARTRATE 75 MG: 50 TABLET, FILM COATED ORAL at 08:53

## 2020-01-01 RX ADMIN — AMPICILLIN SODIUM 500 MG: 500 INJECTION, POWDER, FOR SOLUTION INTRAMUSCULAR; INTRAVENOUS at 04:23

## 2020-01-01 RX ADMIN — DOCUSATE SODIUM - SENNOSIDES 2 TABLET: 50; 8.6 TABLET, FILM COATED ORAL at 17:33

## 2020-01-01 RX ADMIN — HEPARIN SODIUM 5000 UNITS: 5000 INJECTION INTRAVENOUS; SUBCUTANEOUS at 17:24

## 2020-01-01 RX ADMIN — INSULIN LISPRO 3 UNITS: 100 INJECTION, SOLUTION INTRAVENOUS; SUBCUTANEOUS at 21:49

## 2020-01-01 RX ADMIN — OXYCODONE HYDROCHLORIDE AND ACETAMINOPHEN 500 MG: 500 TABLET ORAL at 18:34

## 2020-01-01 RX ADMIN — CEFEPIME HYDROCHLORIDE 1 G: 1 INJECTION, POWDER, FOR SOLUTION INTRAMUSCULAR; INTRAVENOUS at 18:58

## 2020-01-01 RX ADMIN — INSULIN GLARGINE 5 UNITS: 100 INJECTION, SOLUTION SUBCUTANEOUS at 21:37

## 2020-01-01 RX ADMIN — FENTANYL CITRATE 25 MCG: 50 INJECTION INTRAMUSCULAR; INTRAVENOUS at 18:21

## 2020-01-01 RX ADMIN — INSULIN LISPRO 3 UNITS: 100 INJECTION, SOLUTION INTRAVENOUS; SUBCUTANEOUS at 12:34

## 2020-01-01 RX ADMIN — METRONIDAZOLE 500 MG: 500 INJECTION, SOLUTION INTRAVENOUS at 12:35

## 2020-01-01 RX ADMIN — OXYCODONE HYDROCHLORIDE AND ACETAMINOPHEN 500 MG: 500 TABLET ORAL at 10:49

## 2020-01-01 RX ADMIN — ATORVASTATIN CALCIUM 40 MG: 40 TABLET, FILM COATED ORAL at 10:50

## 2020-01-01 RX ADMIN — POLYETHYLENE GLYCOL 3350 17 G: 17 POWDER, FOR SOLUTION ORAL at 08:55

## 2020-01-01 RX ADMIN — METOPROLOL TARTRATE 2.5 MG: 5 INJECTION INTRAVENOUS at 13:10

## 2020-01-01 RX ADMIN — QUETIAPINE FUMARATE 25 MG: 25 TABLET ORAL at 21:14

## 2020-01-01 RX ADMIN — REGULAR STRENGTH 325 MG: 325 TABLET ORAL at 17:35

## 2020-01-01 RX ADMIN — HALOPERIDOL LACTATE 2 MG: 5 INJECTION, SOLUTION INTRAMUSCULAR at 04:16

## 2020-01-01 RX ADMIN — DOCUSATE SODIUM - SENNOSIDES 2 TABLET: 50; 8.6 TABLET, FILM COATED ORAL at 09:10

## 2020-01-01 RX ADMIN — MORPHINE SULFATE 2 MG: 10 INJECTION INTRAVENOUS at 21:31

## 2020-01-01 RX ADMIN — ALBUTEROL SULFATE 2.5 MG: 2.5 SOLUTION RESPIRATORY (INHALATION) at 21:10

## 2020-01-01 RX ADMIN — Medication 10 ML: at 22:43

## 2020-01-01 RX ADMIN — CEFEPIME HYDROCHLORIDE 1 G: 1 INJECTION, POWDER, FOR SOLUTION INTRAMUSCULAR; INTRAVENOUS at 05:00

## 2020-01-01 RX ADMIN — AMPICILLIN SODIUM 500 MG: 500 INJECTION, POWDER, FOR SOLUTION INTRAMUSCULAR; INTRAVENOUS at 16:23

## 2020-01-01 RX ADMIN — ALBUTEROL SULFATE 2.5 MG: 2.5 SOLUTION RESPIRATORY (INHALATION) at 21:21

## 2020-01-01 RX ADMIN — INSULIN LISPRO 5 UNITS: 100 INJECTION, SOLUTION INTRAVENOUS; SUBCUTANEOUS at 16:28

## 2020-01-01 RX ADMIN — MORPHINE SULFATE 2 MG: 10 INJECTION INTRAVENOUS at 21:25

## 2020-01-01 RX ADMIN — ACETAMINOPHEN 650 MG: 325 TABLET, FILM COATED ORAL at 15:18

## 2020-01-01 RX ADMIN — AMPICILLIN SODIUM 500 MG: 500 INJECTION, POWDER, FOR SOLUTION INTRAMUSCULAR; INTRAVENOUS at 12:09

## 2020-01-01 RX ADMIN — Medication 1 TABLET: at 17:46

## 2020-01-01 RX ADMIN — DOCUSATE SODIUM - SENNOSIDES 2 TABLET: 50; 8.6 TABLET, FILM COATED ORAL at 10:49

## 2020-01-01 RX ADMIN — REGULAR STRENGTH 325 MG: 325 TABLET ORAL at 18:00

## 2020-01-01 RX ADMIN — DOCUSATE SODIUM - SENNOSIDES 2 TABLET: 50; 8.6 TABLET, FILM COATED ORAL at 10:06

## 2020-01-01 RX ADMIN — PANTOPRAZOLE SODIUM 40 MG: 40 TABLET, DELAYED RELEASE ORAL at 09:22

## 2020-01-01 RX ADMIN — Medication 1 TABLET: at 17:02

## 2020-01-01 RX ADMIN — ACETAMINOPHEN 650 MG: 325 TABLET, FILM COATED ORAL at 11:40

## 2020-01-01 RX ADMIN — DOCUSATE SODIUM - SENNOSIDES 2 TABLET: 50; 8.6 TABLET, FILM COATED ORAL at 09:09

## 2020-01-01 RX ADMIN — SODIUM CHLORIDE 40 MG: 9 INJECTION INTRAMUSCULAR; INTRAVENOUS; SUBCUTANEOUS at 09:53

## 2020-01-01 RX ADMIN — Medication 10 ML: at 22:59

## 2020-01-01 RX ADMIN — PIPERACILLIN AND TAZOBACTAM 3.38 G: 3; .375 INJECTION, POWDER, LYOPHILIZED, FOR SOLUTION INTRAVENOUS at 22:22

## 2020-01-01 RX ADMIN — DOCUSATE SODIUM - SENNOSIDES 2 TABLET: 50; 8.6 TABLET, FILM COATED ORAL at 17:48

## 2020-01-01 RX ADMIN — HYDRALAZINE HYDROCHLORIDE 5 MG: 20 INJECTION INTRAMUSCULAR; INTRAVENOUS at 20:06

## 2020-01-01 RX ADMIN — INSULIN LISPRO 10 UNITS: 100 INJECTION, SOLUTION INTRAVENOUS; SUBCUTANEOUS at 16:52

## 2020-01-01 RX ADMIN — METOPROLOL TARTRATE 75 MG: 50 TABLET, FILM COATED ORAL at 18:39

## 2020-01-01 RX ADMIN — SUGAMMADEX 200 MG: 100 INJECTION, SOLUTION INTRAVENOUS at 21:04

## 2020-01-01 RX ADMIN — ALBUTEROL SULFATE 2.5 MG: 2.5 SOLUTION RESPIRATORY (INHALATION) at 14:55

## 2020-01-01 RX ADMIN — DOCUSATE SODIUM - SENNOSIDES 2 TABLET: 50; 8.6 TABLET, FILM COATED ORAL at 09:48

## 2020-01-01 RX ADMIN — ACETAMINOPHEN 650 MG: 325 TABLET, FILM COATED ORAL at 23:13

## 2020-01-01 RX ADMIN — LEVOTHYROXINE SODIUM 25 MCG: 0.03 TABLET ORAL at 12:53

## 2020-01-01 RX ADMIN — ACETAMINOPHEN 650 MG: 325 TABLET, FILM COATED ORAL at 23:44

## 2020-01-01 RX ADMIN — OXYCODONE 5 MG: 5 TABLET ORAL at 22:09

## 2020-01-01 RX ADMIN — OXYCODONE 5 MG: 5 TABLET ORAL at 13:11

## 2020-01-01 RX ADMIN — HYDRALAZINE HYDROCHLORIDE 10 MG: 20 INJECTION INTRAMUSCULAR; INTRAVENOUS at 14:33

## 2020-01-01 RX ADMIN — Medication 1 TABLET: at 08:26

## 2020-01-01 RX ADMIN — PIPERACILLIN AND TAZOBACTAM 3.38 G: 3; .375 INJECTION, POWDER, LYOPHILIZED, FOR SOLUTION INTRAVENOUS at 09:26

## 2020-01-01 RX ADMIN — INSULIN LISPRO 2 UNITS: 100 INJECTION, SOLUTION INTRAVENOUS; SUBCUTANEOUS at 17:34

## 2020-01-01 RX ADMIN — REGULAR STRENGTH 325 MG: 325 TABLET ORAL at 17:01

## 2020-01-01 RX ADMIN — INSULIN LISPRO 3 UNITS: 100 INJECTION, SOLUTION INTRAVENOUS; SUBCUTANEOUS at 22:02

## 2020-01-01 RX ADMIN — HYDRALAZINE HYDROCHLORIDE 5 MG: 20 INJECTION INTRAMUSCULAR; INTRAVENOUS at 20:37

## 2020-01-01 RX ADMIN — Medication 10 ML: at 15:51

## 2020-01-01 RX ADMIN — PIPERACILLIN AND TAZOBACTAM 3.38 G: 3; .375 INJECTION, POWDER, LYOPHILIZED, FOR SOLUTION INTRAVENOUS at 05:28

## 2020-01-01 RX ADMIN — Medication 10 ML: at 23:01

## 2020-01-01 RX ADMIN — METRONIDAZOLE 500 MG: 500 INJECTION, SOLUTION INTRAVENOUS at 00:10

## 2020-01-01 RX ADMIN — INSULIN GLARGINE 13 UNITS: 100 INJECTION, SOLUTION SUBCUTANEOUS at 22:10

## 2020-01-01 RX ADMIN — INSULIN LISPRO 7 UNITS: 100 INJECTION, SOLUTION INTRAVENOUS; SUBCUTANEOUS at 13:44

## 2020-01-01 RX ADMIN — OXYCODONE HYDROCHLORIDE AND ACETAMINOPHEN 500 MG: 500 TABLET ORAL at 12:04

## 2020-01-01 RX ADMIN — GLYCOPYRROLATE 0.4 MG: 0.2 INJECTION, SOLUTION INTRAMUSCULAR; INTRAVENOUS at 20:31

## 2020-01-01 RX ADMIN — ALBUTEROL SULFATE 2.5 MG: 2.5 SOLUTION RESPIRATORY (INHALATION) at 09:00

## 2020-01-01 RX ADMIN — DOCUSATE SODIUM - SENNOSIDES 2 TABLET: 50; 8.6 TABLET, FILM COATED ORAL at 17:14

## 2020-01-01 RX ADMIN — ACETAMINOPHEN 650 MG: 325 TABLET, FILM COATED ORAL at 08:43

## 2020-01-01 RX ADMIN — SODIUM CHLORIDE 40 MG: 9 INJECTION INTRAMUSCULAR; INTRAVENOUS; SUBCUTANEOUS at 08:56

## 2020-01-01 RX ADMIN — POTASSIUM CHLORIDE 10 MEQ: 7.46 INJECTION, SOLUTION INTRAVENOUS at 12:33

## 2020-01-01 RX ADMIN — QUETIAPINE FUMARATE 25 MG: 25 TABLET ORAL at 22:43

## 2020-01-01 RX ADMIN — HALOPERIDOL LACTATE 2 MG: 5 INJECTION, SOLUTION INTRAMUSCULAR at 08:30

## 2020-01-01 RX ADMIN — AMLODIPINE BESYLATE 10 MG: 5 TABLET ORAL at 10:14

## 2020-01-01 RX ADMIN — Medication 10 ML: at 21:09

## 2020-01-01 RX ADMIN — Medication 10 ML: at 06:04

## 2020-01-01 RX ADMIN — Medication 1 TABLET: at 17:23

## 2020-01-01 RX ADMIN — AMPICILLIN SODIUM 500 MG: 500 INJECTION, POWDER, FOR SOLUTION INTRAMUSCULAR; INTRAVENOUS at 23:06

## 2020-01-01 RX ADMIN — DOCUSATE SODIUM - SENNOSIDES 2 TABLET: 50; 8.6 TABLET, FILM COATED ORAL at 08:44

## 2020-01-01 RX ADMIN — HEPARIN SODIUM 5000 UNITS: 5000 INJECTION INTRAVENOUS; SUBCUTANEOUS at 00:02

## 2020-01-01 RX ADMIN — AMLODIPINE BESYLATE 10 MG: 5 TABLET ORAL at 08:53

## 2020-01-01 RX ADMIN — REGULAR STRENGTH 325 MG: 325 TABLET ORAL at 08:26

## 2020-01-01 RX ADMIN — ROCURONIUM BROMIDE 20 MG: 10 INJECTION INTRAVENOUS at 19:26

## 2020-01-01 RX ADMIN — INSULIN LISPRO 2 UNITS: 100 INJECTION, SOLUTION INTRAVENOUS; SUBCUTANEOUS at 22:00

## 2020-01-01 RX ADMIN — ALBUTEROL SULFATE 2.5 MG: 2.5 SOLUTION RESPIRATORY (INHALATION) at 08:34

## 2020-01-01 RX ADMIN — DOCUSATE SODIUM - SENNOSIDES 2 TABLET: 50; 8.6 TABLET, FILM COATED ORAL at 17:23

## 2020-01-01 RX ADMIN — AMLODIPINE BESYLATE 2.5 MG: 5 TABLET ORAL at 12:03

## 2020-01-01 RX ADMIN — DOCUSATE SODIUM - SENNOSIDES 2 TABLET: 50; 8.6 TABLET, FILM COATED ORAL at 18:41

## 2020-01-01 RX ADMIN — INSULIN LISPRO 2 UNITS: 100 INJECTION, SOLUTION INTRAVENOUS; SUBCUTANEOUS at 21:36

## 2020-01-01 RX ADMIN — SODIUM CHLORIDE, POTASSIUM CHLORIDE, SODIUM LACTATE AND CALCIUM CHLORIDE: 600; 310; 30; 20 INJECTION, SOLUTION INTRAVENOUS at 19:05

## 2020-01-01 RX ADMIN — INSULIN LISPRO 3 UNITS: 100 INJECTION, SOLUTION INTRAVENOUS; SUBCUTANEOUS at 08:24

## 2020-01-01 RX ADMIN — Medication 1 TABLET: at 12:35

## 2020-01-01 RX ADMIN — LABETALOL HYDROCHLORIDE 20 MG: 5 INJECTION INTRAVENOUS at 09:34

## 2020-01-01 RX ADMIN — Medication 1 TABLET: at 11:52

## 2020-01-01 RX ADMIN — AMPICILLIN SODIUM 500 MG: 500 INJECTION, POWDER, FOR SOLUTION INTRAMUSCULAR; INTRAVENOUS at 16:13

## 2020-01-01 RX ADMIN — HEPARIN SODIUM 5000 UNITS: 5000 INJECTION INTRAVENOUS; SUBCUTANEOUS at 12:00

## 2020-01-01 RX ADMIN — ACETAMINOPHEN 650 MG: 325 TABLET, FILM COATED ORAL at 17:14

## 2020-01-01 RX ADMIN — HYDRALAZINE HYDROCHLORIDE 10 MG: 20 INJECTION INTRAMUSCULAR; INTRAVENOUS at 22:54

## 2020-01-01 RX ADMIN — INSULIN LISPRO 5 UNITS: 100 INJECTION, SOLUTION INTRAVENOUS; SUBCUTANEOUS at 17:25

## 2020-01-01 RX ADMIN — Medication 1 TABLET: at 12:17

## 2020-01-01 RX ADMIN — ACETAMINOPHEN 650 MG: 325 TABLET, FILM COATED ORAL at 05:29

## 2020-01-01 RX ADMIN — AMPICILLIN SODIUM 500 MG: 500 INJECTION, POWDER, FOR SOLUTION INTRAMUSCULAR; INTRAVENOUS at 04:19

## 2020-01-01 RX ADMIN — FUROSEMIDE 20 MG: 20 TABLET ORAL at 08:53

## 2020-01-01 RX ADMIN — ACETAMINOPHEN 650 MG: 325 TABLET, FILM COATED ORAL at 23:04

## 2020-01-01 RX ADMIN — HYDRALAZINE HYDROCHLORIDE 10 MG: 20 INJECTION INTRAMUSCULAR; INTRAVENOUS at 08:55

## 2020-01-01 RX ADMIN — PIPERACILLIN AND TAZOBACTAM 3.38 G: 3; .375 INJECTION, POWDER, LYOPHILIZED, FOR SOLUTION INTRAVENOUS at 21:51

## 2020-01-01 RX ADMIN — CEFEPIME HYDROCHLORIDE 1 G: 1 INJECTION, POWDER, FOR SOLUTION INTRAMUSCULAR; INTRAVENOUS at 21:24

## 2020-01-01 RX ADMIN — POTASSIUM CHLORIDE 10 MEQ: 7.46 INJECTION, SOLUTION INTRAVENOUS at 10:53

## 2020-01-01 RX ADMIN — POTASSIUM CHLORIDE 10 MEQ: 7.46 INJECTION, SOLUTION INTRAVENOUS at 12:51

## 2020-01-01 RX ADMIN — DOCUSATE SODIUM - SENNOSIDES 2 TABLET: 50; 8.6 TABLET, FILM COATED ORAL at 10:14

## 2020-01-01 RX ADMIN — REGULAR STRENGTH 325 MG: 325 TABLET ORAL at 09:10

## 2020-01-01 RX ADMIN — SODIUM CHLORIDE 75 ML/HR: 900 INJECTION, SOLUTION INTRAVENOUS at 02:32

## 2020-01-01 RX ADMIN — INSULIN LISPRO 9 UNITS: 100 INJECTION, SOLUTION INTRAVENOUS; SUBCUTANEOUS at 22:44

## 2020-01-01 RX ADMIN — Medication 10 ML: at 04:38

## 2020-01-01 RX ADMIN — OXYCODONE 5 MG: 5 TABLET ORAL at 10:19

## 2020-01-01 RX ADMIN — HEPARIN SODIUM 5000 UNITS: 5000 INJECTION INTRAVENOUS; SUBCUTANEOUS at 08:26

## 2020-01-01 RX ADMIN — ONDANSETRON HYDROCHLORIDE 4 MG: 2 INJECTION, SOLUTION INTRAMUSCULAR; INTRAVENOUS at 20:05

## 2020-01-01 RX ADMIN — FENTANYL CITRATE 25 MCG: 50 INJECTION, SOLUTION INTRAMUSCULAR; INTRAVENOUS at 21:03

## 2020-01-01 RX ADMIN — ACETAMINOPHEN 650 MG: 325 TABLET, FILM COATED ORAL at 17:22

## 2020-01-01 RX ADMIN — FUROSEMIDE 20 MG: 20 TABLET ORAL at 09:48

## 2020-01-01 RX ADMIN — Medication 1 TABLET: at 10:16

## 2020-01-01 RX ADMIN — CEFEPIME HYDROCHLORIDE 1 G: 1 INJECTION, POWDER, FOR SOLUTION INTRAMUSCULAR; INTRAVENOUS at 14:49

## 2020-01-01 RX ADMIN — FUROSEMIDE 20 MG: 20 TABLET ORAL at 10:15

## 2020-01-01 RX ADMIN — ACETAMINOPHEN 650 MG: 325 TABLET, FILM COATED ORAL at 17:12

## 2020-01-01 RX ADMIN — DONEPEZIL HYDROCHLORIDE 5 MG: 5 TABLET, FILM COATED ORAL at 21:11

## 2020-01-01 RX ADMIN — ATORVASTATIN CALCIUM 40 MG: 40 TABLET, FILM COATED ORAL at 08:53

## 2020-01-01 RX ADMIN — METOPROLOL TARTRATE 75 MG: 50 TABLET, FILM COATED ORAL at 17:24

## 2020-01-01 RX ADMIN — CEFTRIAXONE 1 G: 1 INJECTION, POWDER, FOR SOLUTION INTRAMUSCULAR; INTRAVENOUS at 09:38

## 2020-01-01 RX ADMIN — LABETALOL HYDROCHLORIDE 20 MG: 5 INJECTION INTRAVENOUS at 06:19

## 2020-01-01 RX ADMIN — INSULIN LISPRO 5 UNITS: 100 INJECTION, SOLUTION INTRAVENOUS; SUBCUTANEOUS at 08:14

## 2020-01-01 RX ADMIN — LABETALOL HYDROCHLORIDE 20 MG: 5 INJECTION INTRAVENOUS at 00:34

## 2020-01-01 RX ADMIN — INSULIN LISPRO 3 UNITS: 100 INJECTION, SOLUTION INTRAVENOUS; SUBCUTANEOUS at 09:10

## 2020-01-01 RX ADMIN — ZINC SULFATE 220 MG (50 MG) CAPSULE 1 CAPSULE: CAPSULE at 09:09

## 2020-01-01 RX ADMIN — Medication 10 ML: at 20:20

## 2020-01-01 RX ADMIN — Medication 1 TABLET: at 17:14

## 2020-01-01 RX ADMIN — POTASSIUM CHLORIDE 20 MEQ: 750 TABLET, FILM COATED, EXTENDED RELEASE ORAL at 10:15

## 2020-01-01 RX ADMIN — DOCUSATE SODIUM - SENNOSIDES 2 TABLET: 50; 8.6 TABLET, FILM COATED ORAL at 17:12

## 2020-01-01 RX ADMIN — HEPARIN SODIUM 5000 UNITS: 5000 INJECTION INTRAVENOUS; SUBCUTANEOUS at 18:36

## 2020-01-01 RX ADMIN — POLYETHYLENE GLYCOL 3350 17 G: 17 POWDER, FOR SOLUTION ORAL at 09:47

## 2020-01-01 RX ADMIN — ACETAMINOPHEN 650 MG: 325 TABLET, FILM COATED ORAL at 17:25

## 2020-01-01 RX ADMIN — LABETALOL HYDROCHLORIDE 20 MG: 5 INJECTION INTRAVENOUS at 00:00

## 2020-01-01 RX ADMIN — Medication 1 TABLET: at 16:14

## 2020-01-01 RX ADMIN — AMLODIPINE BESYLATE 10 MG: 5 TABLET ORAL at 08:25

## 2020-01-01 RX ADMIN — HALOPERIDOL LACTATE 2 MG: 5 INJECTION, SOLUTION INTRAMUSCULAR at 18:06

## 2020-01-01 RX ADMIN — AMPICILLIN SODIUM 500 MG: 500 INJECTION, POWDER, FOR SOLUTION INTRAMUSCULAR; INTRAVENOUS at 09:05

## 2020-01-01 RX ADMIN — METOPROLOL TARTRATE 75 MG: 50 TABLET, FILM COATED ORAL at 09:09

## 2020-01-01 RX ADMIN — HEPARIN SODIUM 5000 UNITS: 5000 INJECTION INTRAVENOUS; SUBCUTANEOUS at 09:13

## 2020-01-01 RX ADMIN — Medication 10 ML: at 05:52

## 2020-01-01 RX ADMIN — ATORVASTATIN CALCIUM 40 MG: 40 TABLET, FILM COATED ORAL at 09:56

## 2020-01-01 RX ADMIN — OXYCODONE 5 MG: 5 TABLET ORAL at 17:38

## 2020-01-01 RX ADMIN — Medication 1 TABLET: at 12:03

## 2020-01-01 RX ADMIN — SODIUM CHLORIDE 40 MG: 9 INJECTION INTRAMUSCULAR; INTRAVENOUS; SUBCUTANEOUS at 10:48

## 2020-01-01 RX ADMIN — WATER 2 G: 1 INJECTION INTRAMUSCULAR; INTRAVENOUS; SUBCUTANEOUS at 23:01

## 2020-01-01 RX ADMIN — REGULAR STRENGTH 325 MG: 325 TABLET ORAL at 17:46

## 2020-01-01 RX ADMIN — ALBUTEROL SULFATE 2.5 MG: 2.5 SOLUTION RESPIRATORY (INHALATION) at 01:04

## 2020-01-01 RX ADMIN — QUETIAPINE FUMARATE 25 MG: 25 TABLET ORAL at 22:35

## 2020-01-01 RX ADMIN — INSULIN GLARGINE 5 UNITS: 100 INJECTION, SOLUTION SUBCUTANEOUS at 22:03

## 2020-01-01 RX ADMIN — ACETAMINOPHEN 650 MG: 325 TABLET, FILM COATED ORAL at 05:51

## 2020-01-01 RX ADMIN — INSULIN GLARGINE 5 UNITS: 100 INJECTION, SOLUTION SUBCUTANEOUS at 22:18

## 2020-01-01 RX ADMIN — SODIUM CHLORIDE, SODIUM LACTATE, POTASSIUM CHLORIDE, AND CALCIUM CHLORIDE 500 ML: 600; 310; 30; 20 INJECTION, SOLUTION INTRAVENOUS at 23:13

## 2020-01-01 RX ADMIN — POLYETHYLENE GLYCOL 3350 17 G: 17 POWDER, FOR SOLUTION ORAL at 10:51

## 2020-01-01 RX ADMIN — DOCUSATE SODIUM - SENNOSIDES 2 TABLET: 50; 8.6 TABLET, FILM COATED ORAL at 17:02

## 2020-01-01 RX ADMIN — DOCUSATE SODIUM - SENNOSIDES 2 TABLET: 50; 8.6 TABLET, FILM COATED ORAL at 17:26

## 2020-01-01 RX ADMIN — METOPROLOL TARTRATE 50 MG: 50 TABLET, FILM COATED ORAL at 08:42

## 2020-01-01 RX ADMIN — CEFEPIME HYDROCHLORIDE 1 G: 1 INJECTION, POWDER, FOR SOLUTION INTRAMUSCULAR; INTRAVENOUS at 22:18

## 2020-05-03 PROBLEM — S72.001A CLOSED RIGHT HIP FRACTURE (HCC): Status: ACTIVE | Noted: 2020-01-01

## 2020-05-03 NOTE — ED NOTES
TRANSFER - OUT REPORT: 
 
Verbal report given to DAQUAN Lam(name) on Liu Escudero  being transferred to [facility](unit) for routine progression of care Report consisted of patients Situation, Background, Assessment and  
Recommendations(SBAR). Information from the following report(s) ED Summary was reviewed with the receiving nurse. Opportunity for questions and clarification was provided. Patient transported with: 
 [employer]

## 2020-05-03 NOTE — ED NOTES
Witnessed pt uncomfortable in bed. Asked pt if she was in pain and pt stated she was in pain. Asked if she would like something for her pain for her hip and she said \"yes, please\".

## 2020-05-03 NOTE — ROUTINE PROCESS
Patient tolerating taking am medications whole with applesauce. Patient confsed, alert only to name. Patient attempting to get OOB while I was present in room. I was able to redirect patient. I will continue to close monitor patient using hallway window. Patient spoke with her daughter on the phone. Will continue current plan of care.  
 
Marisol Vanegas RN

## 2020-05-03 NOTE — H&P
HISTORY AND PHYSICAL 
 
 
PCP: Richard Alicea MD 
History source: ER, the patient doesn't provide any history due to dementia CC: hip pain HPI: 78 y.o lady with dementia, DM, HTN, hyperlipidemia, CAD, who presents from a nursing home with right hip pain. She doesn't provide any history. She had multiple falls yesterday and was noted to have a fever and a cough. X-rays were taken there and reportedly showed a right hip fracture. PMH/PSH: 
Past Medical History:  
Diagnosis Date  CAD (coronary artery disease)  Dementia ([facility] [address]  Diabetes ([facility] [address]  Hyperlipemia  Hypertension History reviewed. No pertinent surgical history. Home meds:  
Prior to Admission medications Medication Sig Start Date End Date Taking? Authorizing Provider  
insulin lispro (HumaLOG U-100 Insulin) 100 unit/mL injection by SubCUTAneous route. Yes Deanne, MD Richard  
albuterol (PROVENTIL VENTOLIN) 2.5 mg /3 mL (0.083 %) nebu 2.5 mg by Nebulization route. Yes Richard Alicea MD  
metoprolol tartrate (LOPRESSOR) 50 mg tablet Take  by mouth two (2) times a day. Yes Richard Alicea MD  
pantoprazole (PROTONIX) 40 mg tablet Take 40 mg by mouth daily. Yes Richard Alicea MD  
buPROPion SR (WELLBUTRIN SR) 150 mg SR tablet Take  by mouth two (2) times a day. Yes Richard Alicea MD  
metFORMIN (GLUCOPHAGE) 500 mg tablet Take 500 mg by mouth two (2) times daily (with meals). Yes Richard Alicea MD  
insulin glargine (LANTUS,BASAGLAR) 100 unit/mL (3 mL) inpn by SubCUTAneous route. Yes Richard Alicea MD  
guaiFENesin ER (Mucinex) 600 mg ER tablet Take 600 mg by mouth two (2) times a day. Yes Richard Alicea MD  
atorvastatin (LIPITOR) 40 mg tablet Take 40 mg by mouth daily. Yes Richard Alicea MD  
clopidogreL (PLAVIX) 75 mg tab Take 75 mg by mouth. Yes Richard Alicea MD  
donepeziL (ARICEPT) 5 mg tablet Take 5 mg by mouth nightly.    Yes Richard Alicea MD  
 ascorbic acid, vitamin C, (Vitamin C) 500 mg tablet Take 500 mg by mouth. Yes Other, MD Richard  
 
 
Allergies: 
No Known Allergies FH: 
History reviewed. No pertinent family history. SH: Social History Tobacco Use  Smoking status: Unknown If Ever Smoked  Smokeless tobacco: Never Used Substance Use Topics  Alcohol use: Not Currently ROS: Review of systems not obtained due to patient factors. PHYSICAL EXAM: 
Visit Vitals BP (!) 201/88 Pulse 100 Temp 98.2 °F (36.8 °C) Resp 21 Ht 5' 3\" (1.6 m) Wt 61.1 kg (134 lb 11.2 oz) SpO2 98% BMI 23.86 kg/m² Gen: NAD, non-toxic HEENT: anicteric sclerae, normal conjunctiva, MM dry Neck: supple, trachea midline, no adenopathy Heart: irreg irreg, no MRG, no JVD, no peripheral edema Lungs: CTA b/l anteriorly, poor inspiratory effort, non-labored respirations Abd: soft, NT, ND, BS+ Extr: warm Skin: dry Neuro: grossly non-focal but she is not following commands Psych: not anxious nor agitated, drowsy Labs/Imaging: 
Recent Results (from the past 24 hour(s)) CBC WITH AUTOMATED DIFF Collection Time: 05/02/20 10:23 PM  
Result Value Ref Range WBC 12.0 (H) 3.6 - 11.0 K/uL  
 RBC 3.31 (L) 3.80 - 5.20 M/uL HGB 9.8 (L) 11.5 - 16.0 g/dL HCT 29.4 (L) 35.0 - 47.0 % MCV 88.8 80.0 - 99.0 FL  
 MCH 29.6 26.0 - 34.0 PG  
 MCHC 33.3 30.0 - 36.5 g/dL  
 RDW 14.4 11.5 - 14.5 % PLATELET 408 (H) 701 - 400 K/uL MPV 10.2 8.9 - 12.9 FL  
 NRBC 0.0 0  WBC ABSOLUTE NRBC 0.00 0.00 - 0.01 K/uL NEUTROPHILS 88 (H) 32 - 75 % LYMPHOCYTES 7 (L) 12 - 49 % MONOCYTES 5 5 - 13 % EOSINOPHILS 0 0 - 7 % BASOPHILS 0 0 - 1 % IMMATURE GRANULOCYTES 0 0.0 - 0.5 % ABS. NEUTROPHILS 10.5 (H) 1.8 - 8.0 K/UL  
 ABS. LYMPHOCYTES 0.8 0.8 - 3.5 K/UL  
 ABS. MONOCYTES 0.6 0.0 - 1.0 K/UL  
 ABS. EOSINOPHILS 0.0 0.0 - 0.4 K/UL  
 ABS. BASOPHILS 0.0 0.0 - 0.1 K/UL  
 ABS. IMM. GRANS. 0.0 0.00 - 0.04 K/UL DF AUTOMATED METABOLIC PANEL, COMPREHENSIVE Collection Time: 05/02/20 10:23 PM  
Result Value Ref Range Sodium 140 136 - 145 mmol/L Potassium 3.5 3.5 - 5.1 mmol/L Chloride 107 97 - 108 mmol/L  
 CO2 24 21 - 32 mmol/L Anion gap 9 5 - 15 mmol/L Glucose 92 65 - 100 mg/dL BUN 13 6 - 20 MG/DL Creatinine 0.80 0.55 - 1.02 MG/DL  
 BUN/Creatinine ratio 16 12 - 20 GFR est AA >60 >60 ml/min/1.73m2 GFR est non-AA >60 >60 ml/min/1.73m2 Calcium 9.2 8.5 - 10.1 MG/DL Bilirubin, total 0.8 0.2 - 1.0 MG/DL  
 ALT (SGPT) 36 12 - 78 U/L  
 AST (SGOT) 17 15 - 37 U/L Alk. phosphatase 135 (H) 45 - 117 U/L Protein, total 7.3 6.4 - 8.2 g/dL Albumin 3.4 (L) 3.5 - 5.0 g/dL Globulin 3.9 2.0 - 4.0 g/dL A-G Ratio 0.9 (L) 1.1 - 2.2 MAGNESIUM Collection Time: 05/02/20 10:23 PM  
Result Value Ref Range Magnesium 1.6 1.6 - 2.4 mg/dL LACTIC ACID Collection Time: 05/02/20 10:23 PM  
Result Value Ref Range Lactic acid 2.6 (HH) 0.4 - 2.0 MMOL/L  
TROPONIN I Collection Time: 05/02/20 10:23 PM  
Result Value Ref Range Troponin-I, Qt. <0.05 <0.05 ng/mL NT-PRO BNP Collection Time: 05/02/20 10:23 PM  
Result Value Ref Range NT pro-BNP 4,772 (H) <125 PG/ML  
SAMPLES BEING HELD Collection Time: 05/02/20 10:23 PM  
Result Value Ref Range SAMPLES BEING HELD    
  2 RED TUBES, 1 BLUE TUBE, 2 PEDIATRIC BLOOD CULTURES  
 COMMENT Add-on orders for these samples will be processed based on acceptable specimen integrity and analyte stability, which may vary by analyte. D DIMER Collection Time: 05/02/20 10:23 PM  
Result Value Ref Range D-dimer 30.85 (H) 0.00 - 0.65 mg/L FEU  
LD Collection Time: 05/02/20 10:23 PM  
Result Value Ref Range  (H) 81 - 246 U/L  
PROCALCITONIN Collection Time: 05/02/20 10:23 PM  
Result Value Ref Range Procalcitonin 0.42 ng/mL PROTHROMBIN TIME + INR Collection Time: 05/02/20 10:23 PM  
Result Value Ref Range INR 1.0 0.9 - 1.1 Prothrombin time 10.9 9.0 - 11.1 sec EKG, 12 LEAD, INITIAL Collection Time: 05/02/20 10:41 PM  
Result Value Ref Range Ventricular Rate 88 BPM  
 Atrial Rate 91 BPM  
 QRS Duration 90 ms Q-T Interval 390 ms QTC Calculation (Bezet) 471 ms Calculated R Axis -52 degrees Calculated T Axis 87 degrees Diagnosis Atrial fibrillation with a competing junctional pacemaker Left axis deviation No previous ECGs available URINALYSIS W/ REFLEX CULTURE Collection Time: 05/02/20 11:18 PM  
Result Value Ref Range Color YELLOW/STRAW Appearance CLOUDY (A) CLEAR Specific gravity 1.011 1.003 - 1.030    
 pH (UA) 7.0 5.0 - 8.0 Protein 300 (A) NEG mg/dL Glucose 100 (A) NEG mg/dL Ketone Negative NEG mg/dL Bilirubin Negative NEG Blood SMALL (A) NEG Urobilinogen 1.0 0.2 - 1.0 EU/dL Nitrites Negative NEG Leukocyte Esterase Negative NEG    
 WBC 0-4 0 - 4 /hpf  
 RBC 0-5 0 - 5 /hpf Epithelial cells FEW FEW /lpf Bacteria 2+ (A) NEG /hpf  
 UA:UC IF INDICATED URINE CULTURE ORDERED (A) CNI Hyaline cast 0-2 0 - 5 /lpf Recent Labs 05/02/20 
2223 WBC 12.0* HGB 9.8* HCT 29.4* * Recent Labs 05/02/20 
2223   
K 3.5  CO2 24 BUN 13  
CREA 0.80 GLU 92  
CA 9.2 MG 1.6 Recent Labs 05/02/20 
2223 SGOT 17 ALT 36 * TBILI 0.8 TP 7.3 ALB 3.4*  
GLOB 3.9 Recent Labs 05/02/20 
2223 TROIQ <0.05 Recent Labs 05/02/20 
2223 INR 1.0 PTP 10.9 No results for input(s): PH, PCO2, PO2 in the last 72 hours. Ct Head Wo Cont Result Date: 5/3/2020 IMPRESSION: 1. No acute intracranial hemorrhage. 2. Age-indeterminate microvascular ischemic disease in the periventricular white matter and age indeterminate but likely chronic lacunar infarcts in the bilateral basal ganglia. Ct Chest Wo Cont Result Date: 5/3/2020 IMPRESSION: 1. Small pleural effusions, right greater than left. 2. Centrilobular emphysema. No lung mass or consolidation. 3. Moderate cardiomegaly. 4. Nodular thyroid gland. Ct Pelv Wo Cont Result Date: 5/3/2020 IMPRESSION: Acute nondisplaced right femoral intertrochanteric fracture. Assessment & Plan:  
 
Acute right femoral intertrochanteric fracture: 
-pain control 
-consult ortho 
-therapy evals prior to discharge Fever: up to 100.4 F in the ED. History of a cough. Imaging with small b/l effusions and evidence of emphysema, but no evidence of infection. High suspicion for COVID-19. 
-received ceftriaxone in the ED. Will hold additional antibiotics unless she clinically worsenes as no source of bacterial infection yet 
-f/u COVID test 
 
Type 2 DM: 
-SSI/POC checks HTN: 
-resume home meds -PRN IV labetalol Afib noted here: 
-monitor on tele 
-consider anticoagulation when appropriate post-op Hyperlipidemia CAD: 
-hold Plavix until ortho eval 
 
Elevated NT pro-BNP but she clinically does not appear volume-overloaded. Will check an echocardiogram 
 
DVT ppx: sq heparin Code status: DDNR Disposition: TBD Signed By: Kieran Quiles MD   
 May 3, 2020

## 2020-05-03 NOTE — ED PROVIDER NOTES
EMERGENCY DEPARTMENT HISTORY AND PHYSICAL EXAM 
 
 
Date: 5/2/2020 Patient Name: Cameron Davila Patient Age and Sex: 78 y.o. female History of Presenting Illness Chief Complaint Patient presents with  
 Hip Pain Right hip pain and multiple falls yesterday. unconfirmed right hip fx History Provided By: Patient HPI: Cameron Davila, is a 78 y.o. female  , [facility] and whose medical history is noted below (notably includes hypertension, CAD, diabetes, and dementia) presents to the ER with possible right hip fracture after multiple falls yesterday, fever, cough. Looking back at her medical records, it appears that the patient has chronic right-sided hip pain and is prone to falls. However it seems that the falls yesterday led to increased pain in her hip, x-rays were taken and read as concerning for a possible nondisplaced intertrochanteric fracture of her right hip. In addition to the hip pain, she has had a wet cough, seems like at least since yesterday. Her oral temperature was normal but patient had elevated rectal temperature on arrival here to the emergency room. Rest of vital signs are normal.  The patient herself is a very poor historian due to history of dementia. Reports no chest pain or abdominal pain. Has a frequent and wet cough during my interview. Confirms pain in her right hip but denies any other discomfort. Pt denies any other alleviating or exacerbating factors. There are no other complaints, changes or physical findings at this time. Past Medical History:  
Diagnosis Date  CAD (coronary artery disease)  Dementia ([address]  Diabetes ([address]  Hyperlipemia  Hypertension History reviewed. No pertinent surgical history. PCP: Richard Alicea MD 
 
Past History Past Medical History: 
Past Medical History:  
Diagnosis Date  CAD (coronary artery disease)  Dementia ([address]  Diabetes ([address]  Hyperlipemia  Hypertension Past Surgical History: 
History reviewed. No pertinent surgical history. Family History: 
History reviewed. No pertinent family history. Social History: 
Social History Tobacco Use  Smoking status: Unknown If Ever Smoked  Smokeless tobacco: Never Used Substance Use Topics  Alcohol use: Not Currently  Drug use: Not on file Allergies: 
No Known Allergies Current Medications: No current facility-administered medications on file prior to encounter. Current Outpatient Medications on File Prior to Encounter Medication Sig Dispense Refill  insulin lispro (HumaLOG U-100 Insulin) 100 unit/mL injection by SubCUTAneous route.  albuterol (PROVENTIL VENTOLIN) 2.5 mg /3 mL (0.083 %) nebu 2.5 mg by Nebulization route.  metoprolol tartrate (LOPRESSOR) 50 mg tablet Take  by mouth two (2) times a day.  pantoprazole (PROTONIX) 40 mg tablet Take 40 mg by mouth daily.  buPROPion SR (WELLBUTRIN SR) 150 mg SR tablet Take  by mouth two (2) times a day.  metFORMIN (GLUCOPHAGE) 500 mg tablet Take 500 mg by mouth two (2) times daily (with meals).  insulin glargine (LANTUS,BASAGLAR) 100 unit/mL (3 mL) inpn by SubCUTAneous route.  guaiFENesin ER (Mucinex) 600 mg ER tablet Take 600 mg by mouth two (2) times a day.  atorvastatin (LIPITOR) 40 mg tablet Take 40 mg by mouth daily.  clopidogreL (PLAVIX) 75 mg tab Take 75 mg by mouth.  donepeziL (ARICEPT) 5 mg tablet Take 5 mg by mouth nightly.  ascorbic acid, vitamin C, (Vitamin C) 500 mg tablet Take 500 mg by mouth. Review of Systems Review of Systems Constitutional: Positive for appetite change and fever. HENT: Negative for congestion and rhinorrhea. Respiratory: Positive for cough. Negative for shortness of breath and wheezing. Cardiovascular: Negative for chest pain and leg swelling. Gastrointestinal: Negative for abdominal pain, diarrhea, nausea and vomiting. Genitourinary: Negative for difficulty urinating and flank pain. Musculoskeletal: Positive for arthralgias. Negative for joint swelling and neck pain. Skin: Negative. Negative for rash. Neurological: Negative for seizures and headaches. All other systems reviewed and are negative. Physical Exam  
 
Physical Exam 
Vitals signs and nursing note reviewed. Constitutional:   
   General: She is awake. Appearance: She is underweight. She is ill-appearing. HENT:  
   Head: Atraumatic. Nose: No congestion. Mouth/Throat:  
   Mouth: Mucous membranes are dry. Eyes:  
   General: Scleral icterus present. Extraocular Movements: Extraocular movements intact. Pupils: Pupils are equal, round, and reactive to light. Neck: Musculoskeletal: Neck supple. No muscular tenderness. Cardiovascular:  
   Rate and Rhythm: Normal rate and regular rhythm. Pulses: Normal pulses. Heart sounds: Normal heart sounds. Pulmonary:  
   Effort: Pulmonary effort is normal. No respiratory distress. Breath sounds: No wheezing. Comments: Coarse breath sounds bilat at bases. Poor effort Abdominal:  
   General: Abdomen is flat. There is no distension. Palpations: Abdomen is soft. Tenderness: There is no abdominal tenderness. There is no right CVA tenderness or left CVA tenderness. Musculoskeletal:  
   Right lower leg: No edema. Left lower leg: No edema. Comments: No obvious deformity noted. Tender to palpation of right hip. Palpable dp pulses Lymphadenopathy:  
   Cervical: No cervical adenopathy. Skin: 
   General: Skin is warm and dry. Capillary Refill: Capillary refill takes less than 2 seconds. Findings: No rash. Neurological:  
   Mental Status: Mental status is at baseline. She is disoriented. Diagnostic Study Results Labs - I have personally reviewed and interpreted all laboratory results. Yanna Wilson MD, MSc Recent Results (from the past 24 hour(s)) CBC WITH AUTOMATED DIFF Collection Time: 05/02/20 10:23 PM  
Result Value Ref Range WBC 12.0 (H) 3.6 - 11.0 K/uL  
 RBC 3.31 (L) 3.80 - 5.20 M/uL HGB 9.8 (L) 11.5 - 16.0 g/dL HCT 29.4 (L) 35.0 - 47.0 % MCV 88.8 80.0 - 99.0 FL  
 MCH 29.6 26.0 - 34.0 PG  
 MCHC 33.3 30.0 - 36.5 g/dL  
 RDW 14.4 11.5 - 14.5 % PLATELET 811 (H) 365 - 400 K/uL MPV 10.2 8.9 - 12.9 FL  
 NRBC 0.0 0  WBC ABSOLUTE NRBC 0.00 0.00 - 0.01 K/uL NEUTROPHILS 88 (H) 32 - 75 % LYMPHOCYTES 7 (L) 12 - 49 % MONOCYTES 5 5 - 13 % EOSINOPHILS 0 0 - 7 % BASOPHILS 0 0 - 1 % IMMATURE GRANULOCYTES 0 0.0 - 0.5 % ABS. NEUTROPHILS 10.5 (H) 1.8 - 8.0 K/UL  
 ABS. LYMPHOCYTES 0.8 0.8 - 3.5 K/UL  
 ABS. MONOCYTES 0.6 0.0 - 1.0 K/UL  
 ABS. EOSINOPHILS 0.0 0.0 - 0.4 K/UL  
 ABS. BASOPHILS 0.0 0.0 - 0.1 K/UL  
 ABS. IMM. GRANS. 0.0 0.00 - 0.04 K/UL  
 DF AUTOMATED METABOLIC PANEL, COMPREHENSIVE Collection Time: 05/02/20 10:23 PM  
Result Value Ref Range Sodium 140 136 - 145 mmol/L Potassium 3.5 3.5 - 5.1 mmol/L Chloride 107 97 - 108 mmol/L  
 CO2 24 21 - 32 mmol/L Anion gap 9 5 - 15 mmol/L Glucose 92 65 - 100 mg/dL BUN 13 6 - 20 MG/DL Creatinine 0.80 0.55 - 1.02 MG/DL  
 BUN/Creatinine ratio 16 12 - 20 GFR est AA >60 >60 ml/min/1.73m2 GFR est non-AA >60 >60 ml/min/1.73m2 Calcium 9.2 8.5 - 10.1 MG/DL Bilirubin, total 0.8 0.2 - 1.0 MG/DL  
 ALT (SGPT) 36 12 - 78 U/L  
 AST (SGOT) 17 15 - 37 U/L Alk. phosphatase 135 (H) 45 - 117 U/L Protein, total 7.3 6.4 - 8.2 g/dL Albumin 3.4 (L) 3.5 - 5.0 g/dL Globulin 3.9 2.0 - 4.0 g/dL A-G Ratio 0.9 (L) 1.1 - 2.2 MAGNESIUM Collection Time: 05/02/20 10:23 PM  
Result Value Ref Range Magnesium 1.6 1.6 - 2.4 mg/dL LACTIC ACID  Collection Time: 05/02/20 10:23 PM  
 Result Value Ref Range Lactic acid 2.6 (HH) 0.4 - 2.0 MMOL/L  
TROPONIN I Collection Time: 05/02/20 10:23 PM  
Result Value Ref Range Troponin-I, Qt. <0.05 <0.05 ng/mL NT-PRO BNP Collection Time: 05/02/20 10:23 PM  
Result Value Ref Range NT pro-BNP 4,772 (H) <125 PG/ML  
SAMPLES BEING HELD Collection Time: 05/02/20 10:23 PM  
Result Value Ref Range SAMPLES BEING HELD    
  2 RED TUBES, 1 BLUE TUBE, 2 PEDIATRIC BLOOD CULTURES  
 COMMENT Add-on orders for these samples will be processed based on acceptable specimen integrity and analyte stability, which may vary by analyte. EKG, 12 LEAD, INITIAL Collection Time: 05/02/20 10:41 PM  
Result Value Ref Range Ventricular Rate 88 BPM  
 Atrial Rate 91 BPM  
 QRS Duration 90 ms Q-T Interval 390 ms QTC Calculation (Bezet) 471 ms Calculated R Axis -52 degrees Calculated T Axis 87 degrees Diagnosis Atrial fibrillation with a competing junctional pacemaker Left axis deviation No previous ECGs available URINALYSIS W/ REFLEX CULTURE Collection Time: 05/02/20 11:18 PM  
Result Value Ref Range Color YELLOW/STRAW Appearance CLOUDY (A) CLEAR Specific gravity 1.011 1.003 - 1.030    
 pH (UA) 7.0 5.0 - 8.0 Protein 300 (A) NEG mg/dL Glucose 100 (A) NEG mg/dL Ketone Negative NEG mg/dL Bilirubin Negative NEG Blood SMALL (A) NEG Urobilinogen 1.0 0.2 - 1.0 EU/dL Nitrites Negative NEG Leukocyte Esterase Negative NEG    
 WBC 0-4 0 - 4 /hpf  
 RBC 0-5 0 - 5 /hpf Epithelial cells FEW FEW /lpf Bacteria 2+ (A) NEG /hpf  
 UA:UC IF INDICATED URINE CULTURE ORDERED (A) CNI Hyaline cast 0-2 0 - 5 /lpf Radiologic Studies - I have personally reviewed and interpreted all imaging studies and agree with radiology interpretation and report. - Yanna Wilson MD, MSc 
CT HEAD WO CONT Final Result IMPRESSION:   
1. No acute intracranial hemorrhage. 2. Age-indeterminate microvascular ischemic disease in the periventricular white  
matter and age indeterminate but likely chronic lacunar infarcts in the  
bilateral basal ganglia. CT CHEST WO CONT Final Result IMPRESSION:   
1. Small pleural effusions, right greater than left. 2. Centrilobular emphysema. No lung mass or consolidation. 3. Moderate cardiomegaly. 4. Nodular thyroid gland. CT PELV WO CONT Final Result IMPRESSION:   
Acute nondisplaced right femoral intertrochanteric fracture. Medical Decision Making I am the first provider for this patient. Records Reviewed: I reviewed our electronic medical record system for any past medical records that were available that may contribute to the patient's current condition, including their PMH, surgical history, social and family history. Reviewed the nursing notes and vital signs from today's visit. Nursing notes will be reviewed as they become available in realtime while the pt has been in the ED. Evie Cunningham MD Msc 
 
Vital Signs-Reviewed the patient's vital signs. Patient Vitals for the past 24 hrs: 
 Temp Pulse Resp BP SpO2  
05/02/20 2315 99.3 °F (37.4 °C) 93 22 185/79 96 % 05/02/20 2215  86 15 134/75 94 % 05/02/20 2200  90 18 (!) 123/94 100 % 05/02/20 2137 100.2 °F (37.9 °C) 97 18 163/71  Provider Notes (Medical Decision Making): The patient is a demented 69-year-old female who has limited ability to provide us a full history but presents with what appears to be 2 unrelated complaints. For one, she has a history of multiple falls and has had several falls yesterday. The concern is that she may have broken a hip during the fall yesterday. X-rays today were suggestive of a fracture but will need to be confirmed with CT.  
In addition to this, she has an elevated rectal temperature of (initial 100.2, second measurement taken for confirmation and it was 100.4), a wet cough that has been present for at least 2 days. She is not in any respiratory distress and her oxygen saturation is normal.  We have conducted a full infectious work-up to exclude pneumonia, however, YAMILKA is certainly in the differential and she is a high risk patient due to her residence in a convalescent home. Based on overall clinical picture, I suspect that she will need to be admitted today for further management. Labs pending. Because of the possibility of coronavirus, I am giving her fluids judiciously despite the fact that clinically she seems to be volume depleted. Her blood pressure is elevated if anything. She tolerates 500 cc without issues, will go ahead and give her another 500cc. 4:00 AM 
CT confirms the acute fracture. Have spoken with Ortho. Hip fracture protocol ordered and/or done. CT of her chest does not suggest COVID and excludes pneumonia. Her urine is positive for bacteria, seems to be a clean sample but has no nitrites or leuk esterase. Difficult to interpret, but I will give her a dose of ceftriaxone until all cultures are back. Will be admitted for further management. I will discuss case with Ortho as well as medicine. ED Course:  
Initial assessment performed. The patients presenting problems have been discussed, and they are in agreement with the care plan formulated and outlined with them. I have encouraged them to ask questions as they arise throughout their visit. Juanita Hickman MD, am the attending of record for this patient encounter. ED Orders Placed/Medications Administered During ED Course:  
Orders Placed This Encounter  RESPIRATORY PANEL,PCR,NASOPHARYNGEAL  
 LEGIONELLA PNEUMOPHILA AG, URINE  
 S. PNEUMO AG, UR/CSF  CULTURE, RESPIRATORY/SPUTUM/BRONCH W GRAM STAIN  
 EMERGENT DISEASE PANEL  
 CULTURE, BLOOD, PAIRED  CT HEAD WO CONT  CT CHEST WO CONT  CT LOW EXT RT WO CONT  CBC WITH AUTOMATED DIFF  
  METABOLIC PANEL, COMPREHENSIVE  
 MAGNESIUM  
 LACTIC ACID, PLASMA  TROPONIN I  
 PRO-BNP  URINALYSIS W/ REFLEX CULTURE  
 Hold Sample  PROCALCITONIN  
 PROTHROMBIN TIME + INR  C REACTIVE PROTEIN, QT  
 LD  
 FERRITIN  
 D DIMER [address]  WEIGH PATIENT  NURSING-MISCELLANEOUS: PPE required for any aerosolizing procedure (ie, intubation, bronchoscopy). Surgical mask on patient for any transport outside room. Patient should be single room, closed door with notification of droplet plus isolation. CON. ..  DROPLET PLUS  
 EKG, 12 LEAD, INITIAL  
 SALINE LOCK IV ONE TIME STAT  insulin lispro (HumaLOG U-100 Insulin) 100 unit/mL injection  albuterol (PROVENTIL VENTOLIN) 2.5 mg /3 mL (0.083 %) nebu  metoprolol tartrate (LOPRESSOR) 50 mg tablet  pantoprazole (PROTONIX) 40 mg tablet  buPROPion SR (WELLBUTRIN SR) 150 mg SR tablet  metFORMIN (GLUCOPHAGE) 500 mg tablet  insulin glargine (LANTUS,BASAGLAR) 100 unit/mL (3 mL) inpn  guaiFENesin ER (Mucinex) 600 mg ER tablet  atorvastatin (LIPITOR) 40 mg tablet  clopidogreL (PLAVIX) 75 mg tab  donepeziL (ARICEPT) 5 mg tablet  ascorbic acid, vitamin C, (Vitamin C) 500 mg tablet  DISCONTD: oxyCODONE-acetaminophen (PERCOCET) 5-325 mg per tablet 1 Tab  OR Linked Order Group  acetaminophen (TYLENOL) tablet 650 mg  
  acetaminophen (TYLENOL) suppository 650 mg  
 lactated ringers bolus infusion 500 mL Medications  
acetaminophen (TYLENOL) tablet 650 mg (has no administration in time range) Or  
acetaminophen (TYLENOL) suppository 650 mg (has no administration in time range)  
lactated ringers bolus infusion 500 mL (has no administration in time range) Consult Note: 
Maverick Oliveira MD spoke with ortho, medicine, Discussed pt's hx, physical exam and available diagnostic and imaging results. Reviewed care plans. Agree with management and plan thus far. DISPOSITION: ADMIT Patient is being admitted to the hospital.  Their test results and reasons for admission have been discussed. The patient and/or available family express agreement with and understanding of the need to be admitted and their admission diagnosis. Thank you for resuming the care of this patient. Please don't hesitate to contact me in the emergency department if you  have any additional questions. Yanna Wilson MD, MSc 
 
 
 
Diagnosis Clinical Impression: 1. Closed fracture of right hip, initial encounter ([facility] [address] 2. Fever, unspecified fever cause Attestation: 
I personally performed the services described in this documentation on this date 5/2/2020 for patient Wicho Zarate. Yanna Wilson MD 
 
Please note that this dictation was completed with milabent, the computer voice recognition software. Quite often unanticipated grammatical, syntax, homophones, and other interpretive errors are inadvertently transcribed by the computer software. Please disregard these errors. Please excuse any errors that have escaped final proofreading.

## 2020-05-03 NOTE — ANESTHESIA PREPROCEDURE EVALUATION
Relevant Problems No relevant active problems Anesthetic History No history of anesthetic complications Review of Systems / Medical History Patient summary reviewed, nursing notes reviewed and pertinent labs reviewed Pulmonary COPD Comments: Smoking history unknown. Neuro/Psych Dementia Cardiovascular Hypertension: poorly controlled Dysrhythmias : atrial fibrillation CAD and hyperlipidemia Exercise tolerance: <4 METS Comments: 5/3/2020 ECHO:  65-70% EF with mild to moderate MR, trace TR and CO and a small pericardial effusion Hx Atrial fibrillation with a competing junctional pacemaker GI/Hepatic/Renal 
Within defined limits Endo/Other Diabetes: poorly controlled, type 2, using insulin Other Findings Comments: Closed right hip fracture Physical Exam 
 
Airway Mallampati: II 
 
Neck ROM: normal range of motion Mouth opening: Normal 
 
 Cardiovascular Regular rate and rhythm,  S1 and S2 normal,  no murmur, click, rub, or gallop Dental 
 
Dentition: Edentulous Pulmonary Breath sounds clear to auscultation Abdominal 
GI exam deferred Other Findings Anesthetic Plan ASA: 4 Anesthesia type: general 
 
Monitoring Plan: BIS Induction: Intravenous Anesthetic plan and risks discussed with: Patient DNR patient with dementia and R/O Covid-19

## 2020-05-03 NOTE — PROGRESS NOTES
Hospitalist Progress Note NAME: Charissa Hartman :  1950 MRN:  [id number] I reviewed with Dr. Princess Peterson about the medical history and the findings on the physical examination. I discussed with Dr. Princess Peterson the patient's diagnosis and concur with the plan. Interim Hospital Summary: 78 y.o. female whom presented on 2020 with dementia, DM, HTN, hyperlipidemia, CAD, who presents from a nursing home with right hip pain. She doesn't provide any history. She had multiple falls yesterday and was noted to have a fever and a cough. X-rays were taken there and reportedly showed a right hip fracture Assessment / Plan: 
COVID 19 rule out; febrile episode, elevated inflammatory markers (LD, Procal, D-dimer, IL6 pending) - COVID 19 pending EKG: A-fib with junctional rhythm, QTC 371ms LFT: , AST and ALT within normal range CT chest: Small pleural effusions, right greater than left. Centrilobular emphysema. No lung mass or consolidation. Moderate cardiomegaly. Nodular thyroid gland. Ferritin 141, CRP 5.49  
   procal 0.42 Troponin <0.05 
   D-dimer 30.85 IL 6 to be done No Hydroxychloroquine; elderly, a-fib, hypertension. Continue with statin Vitamin C 500mg po BID Zinc 220mg PO BID 
   U/A (-) nitrites, leukocyte esterase with 2+ bacteria; urine cx pending 
  ->meanwhile, we will continue with IV rocephin daily for now Blood cx no growth so far 
   resp panel (-) Acute right femoral intertrochanteric fracture - CT pel: Acute nondisplaced right femoral intertrochanteric fracture Plan for surgery tomorrow if COVID 19 testing is (-) per Dr. Juan Blackman Elevated D-dimer, persistent a-fib; high risk of having embolic event. Will check duplex lower extremities to r/o DVT Persistent A-fib Persistent Hypertension CAD Hyperlipidemia 
- continue with BB with parameter Add Norvasc    Labetalol IV PRN 
 AC will be discussed postoperatively once she recovers from surgery NT pro-BNP 4772 Echo to be done Plavix on hold. Continue with statin Appreciate cardiology input; spoke with Dr. Adele Hammer. Usual cardiologist - Dr. Sachi Zapata Type 2 DM  
- check A1C Check qac/qhs blood glucose and follow SSI Hx of CVA 
- residual weakness in left side; two strokes in the past 
  Continue with statin 
  plavix on hold for surgery Dementia  
- baseline; knows herself and place, able to tell daughter's name Lactic acidosis; resolved peak 4.7 -> 1.7 Estimated body mass index is 23.86 kg/m² as calculated from the following: 
  Height as of this encounter: 5' 3\" (1.6 m). Weight as of this encounter: 61.1 kg (134 lb 11.2 oz). Code status: DNR Prophylaxis: SCD's Recommended Disposition: LTC Pt's Verenice Piedra ([phone]) Updated medical information, answered, addressed all of her questions. [place] DNR/DNI, proceed with surgery b/c she doesn't want her mom (pt) to be in bedridden. Understands the poor & challenging recovery Subjective: Chief Complaint / Reason for Physician Visit Alert and orient to self and palce. \"I just don't feel good. \"  Discussed with RN events overnight. Review of Systems: 
Symptom Y/N Comments  Symptom Y/N Comments Fever/Chills n   Chest Pain n   
Poor Appetite    Edema Cough    Abdominal Pain n   
Sputum    Joint Pain y   
SOB/MAGUIRE n   Pruritis/Rash Nausea/vomit n   Tolerating PT/OT Diarrhea n   Tolerating Diet Constipation n   Other Could NOT obtain due to:   
 
Objective: VITALS:  
Last 24hrs VS reviewed since prior progress note. Most recent are: 
Patient Vitals for the past 24 hrs: 
 Temp Pulse Resp BP SpO2  
05/03/20 0634 98.5 °F (36.9 °C) 72 20 172/63 98 % 05/03/20 0600  68 23 156/68   
05/03/20 0530  96 21 193/81 97 % 05/03/20 0500  96 21 (!) 191/106 98 % 05/03/20 0430  98 18 197/89 96 % 05/03/20 0400  99 17 162/88 96 % 05/03/20 0330  96 18 176/73 98 % 05/03/20 0230  (!) 102 18 (!) 193/97   
05/03/20 0218 98.2 °F (36.8 °C) 100 21 (!) 201/88 98 % 05/03/20 0100  (!) 101 28 170/76 94 % 05/03/20 0011    167/90   
05/02/20 2315 99.3 °F (37.4 °C) 93 22 185/79 96 % 05/02/20 2215  86 15 134/75 94 % 05/02/20 2200  90 18 (!) 123/94 100 % 05/02/20 2137 100.2 °F (37.9 °C) 97 18 163/71  Intake/Output Summary (Last 24 hours) at 5/3/2020 9472 Last data filed at [address] Gross per 24 hour Intake 533.75 ml Output 450 ml Net 83.75 ml PHYSICAL EXAM: 
General: Ill appearing. Alert, uncooperative at times, no acute distress, in fetal position when entered the room EENT:  EOMI. Anicteric sclerae. MMM Resp:  CTA bilaterally, no wheezing or rales. No accessory muscle use CV:             irregular  rhythm,  No edema GI:  Soft, Non distended, Non tender. +Bowel sounds Neurologic:  Alert and oriented X self and place, normal speech, Psych:   Poor insight. became restless during visit Skin:  No rashes. No jaundice Reviewed most current lab test results and cultures  YES Reviewed most current radiology test results   YES Review and summation of old records today    NO Reviewed patient's current orders and MAR    YES 
PMH/SH reviewed - no change compared to H&P 
________________________________________________________________________ Care Plan discussed with: 
  Comments Patient y Family  y Phyllis Griffith (daughter) [phone] RN y   
Care Manager Consultant Multidiciplinary team rounds were held today with , nursing, pharmacist and clinical coordinator. Patient's plan of care was discussed; medications were reviewed and discharge planning was addressed. ________________________________________________________________________ Светлана Zarate NP  
 
 Procedures: see electronic medical records for all procedures/Xrays and details which were not copied into this note but were reviewed prior to creation of Plan. LABS: 
I reviewed today's most current labs and imaging studies. Pertinent labs include: 
Recent Labs 05/02/20 2223 WBC 12.0* HGB 9.8* HCT 29.4* * Recent Labs 05/03/20 0227 05/02/20 2223 NA  --  140 K  --  3.5 CL  --  107 CO2  --  24 GLU  --  92 BUN  --  13  
CREA  --  0.80 CA  --  9.2 MG  --  1.6 ALB  --  3.4* TBILI  --  0.8 SGOT  --  17 ALT  --  36 INR 1.1 1.0 Signed: )Emerita Priest, NP

## 2020-05-03 NOTE — ED TRIAGE NOTES
Pt arrives via EMS from [facility], per report pt had several GLF yesterday and arrives today with right hip pain and rectal temp of 102 Pt has hx of dementia and is not answering any questions at this time. Per report this is baseline Pt placed x3 on monitor, bed in low position, call bell in reach, side rails x2.

## 2020-05-03 NOTE — CONSULTS
ORTHOPAEDIC CONSULT NOTE Subjective:  
 
Date of Consultation:  May 3, 2020 Liu Escudero is a 78 y.o. female who is being seen for R pain s/p multiple falls at the SNF where pt is a resident, hx of dementia. Work up reveled a R IT femur fracture. Pt resting in bed at this time Patient Active Problem List  
 Diagnosis Date Noted  Closed right hip fracture ([facility] [address] [address] 05/03/2020 History reviewed. No pertinent family history. Social History Tobacco Use  Smoking status: Unknown If Ever Smoked  Smokeless tobacco: Never Used Substance Use Topics  Alcohol use: Not Currently Past Medical History:  
Diagnosis Date  CAD (coronary artery disease)  Dementia ([facility] [address]  Diabetes ([facility]  Hyperlipemia  Hypertension History reviewed. No pertinent surgical history. Prior to Admission medications Medication Sig Start Date End Date Taking? Authorizing Provider  
insulin lispro (HumaLOG U-100 Insulin) 100 unit/mL injection by SubCUTAneous route. Yes Other, MD Richard  
albuterol (PROVENTIL VENTOLIN) 2.5 mg /3 mL (0.083 %) nebu 2.5 mg by Nebulization route. Yes Deanne, MD Richard  
metoprolol tartrate (LOPRESSOR) 50 mg tablet Take  by mouth two (2) times a day. Yes Richard Alicea MD  
pantoprazole (PROTONIX) 40 mg tablet Take 40 mg by mouth daily. Yes Deanne, MD Richard  
buPROPion SR (WELLBUTRIN SR) 150 mg SR tablet Take  by mouth two (2) times a day. Yes Richard Alicea MD  
metFORMIN (GLUCOPHAGE) 500 mg tablet Take 500 mg by mouth two (2) times daily (with meals). Yes Deanne, MD Richard  
insulin glargine (LANTUS,BASAGLAR) 100 unit/mL (3 mL) inpn by SubCUTAneous route. Yes Deanne, MD Richard  
guaiFENesin ER (Mucinex) 600 mg ER tablet Take 600 mg by mouth two (2) times a day. Yes Richard Alicea MD  
atorvastatin (LIPITOR) 40 mg tablet Take 40 mg by mouth daily. Yes Richard Alicea MD  
clopidogreL (PLAVIX) 75 mg tab Take 75 mg by mouth.    Yes Richard Alicea MD  
 donepeziL (ARICEPT) 5 mg tablet Take 5 mg by mouth nightly. Yes Deanne, MD Richard  
ascorbic acid, vitamin C, (Vitamin C) 500 mg tablet Take 500 mg by mouth. Yes Deanne, MD Richard  
 
Current Facility-Administered Medications Medication Dose Route Frequency  ondansetron (ZOFRAN) injection 4 mg  4 mg IntraVENous Q4H PRN  
 sodium chloride (NS) flush 5-40 mL  5-40 mL IntraVENous Q8H  
 sodium chloride (NS) flush 5-40 mL  5-40 mL IntraVENous PRN  
 heparin (porcine) injection 5,000 Units  5,000 Units SubCUTAneous Q8H  
 traMADoL (ULTRAM) tablet 50 mg  50 mg Oral Q6H PRN  
 fentaNYL citrate (PF) injection 25 mcg  25 mcg IntraVENous Q4H PRN  
 atorvastatin (LIPITOR) tablet 40 mg  40 mg Oral DAILY  donepeziL (ARICEPT) tablet 5 mg  5 mg Oral QHS  metoprolol tartrate (LOPRESSOR) tablet 50 mg  50 mg Oral BID  sodium chloride (NS) flush 5-40 mL  5-40 mL IntraVENous Q8H  
 sodium chloride (NS) flush 5-40 mL  5-40 mL IntraVENous PRN  
 acetaminophen (TYLENOL) tablet 650 mg  650 mg Oral Q6H  
 oxyCODONE IR (ROXICODONE) tablet 2.5 mg  2.5 mg Oral Q4H PRN  
 oxyCODONE IR (ROXICODONE) tablet 5 mg  5 mg Oral Q4H PRN  
 naloxone (NARCAN) injection 0.4 mg  0.4 mg IntraVENous PRN  
 senna-docusate (PERICOLACE) 8.6-50 mg per tablet 2 Tab  2 Tab Oral BID  
 0.9% sodium chloride infusion  75 mL/hr IntraVENous CONTINUOUS  
 labetaloL (NORMODYNE;TRANDATE) injection 20 mg  20 mg IntraVENous Q6H PRN  
 acetaminophen (TYLENOL) tablet 650 mg  650 mg Oral Q6H PRN Or  
 acetaminophen (TYLENOL) suppository 650 mg  650 mg Rectal Q6H PRN No Known Allergies Review of Systems:  A comprehensive review of systems was negative except for that written in the HPI. Mental Status: severe dementia Objective:  
 
Patient Vitals for the past 8 hrs: 
 BP Temp Pulse Resp SpO2  
05/03/20 0747 186/70 98.2 °F (36.8 °C) 72 18 98 % 05/03/20 0634 172/63 98.5 °F (36.9 °C) 72 20 98 % 05/03/20 0600 156/68  68 23  20 0530 193/81  96 21 97 % 20 0500 (!) 191/106  96 21 98 % 20 0430 197/89  98 18 96 % 20 0400 162/88  99 17 96 % 20 0330 176/73  96 18 98 % 20 0230 (!) 193/97  (!) 102 18  Temp (24hrs), Av.9 °F (37.2 °C), Min:98.2 °F (36.8 °C), Max:100.2 °F (37.9 °C) Gen: Well-developed,  in no acute distress Musc: RLE - shortened and in position of comfort, NVI Skin: No skin breakdown noted. Skin warm, pink, dry Imaging Review:TECHNIQUE: Helical CT of the pelvis  without contrast. Coronal and sagittal 
reformats are performed. CT dose reduction was achieved through use of a 
standardized protocol tailored for this examination and automatic exposure 
control for dose modulation. 
  
FINDINGS:  
There is an acute nondisplaced right femoral intertrochanteric fracture. There 
is no dislocation. There is mild bilateral hip joint space narrowing. 
  
There is a George catheter within the urinary bladder. There is no free fluid or 
free air. There is no lymphadenopathy or pelvic mass. There are no dilated bowel 
loops. 
  
IMPRESSION IMPRESSION:  
Acute nondisplaced right femoral intertrochanteric fracture. Labs:  
Recent Results (from the past 24 hour(s)) CBC WITH AUTOMATED DIFF Collection Time: 20 10:23 PM  
Result Value Ref Range WBC 12.0 (H) 3.6 - 11.0 K/uL  
 RBC 3.31 (L) 3.80 - 5.20 M/uL HGB 9.8 (L) 11.5 - 16.0 g/dL HCT 29.4 (L) 35.0 - 47.0 % MCV 88.8 80.0 - 99.0 FL  
 MCH 29.6 26.0 - 34.0 PG  
 MCHC 33.3 30.0 - 36.5 g/dL  
 RDW 14.4 11.5 - 14.5 % PLATELET 909 (H) 299 - 400 K/uL MPV 10.2 8.9 - 12.9 FL  
 NRBC 0.0 0  WBC ABSOLUTE NRBC 0.00 0.00 - 0.01 K/uL NEUTROPHILS 88 (H) 32 - 75 % LYMPHOCYTES 7 (L) 12 - 49 % MONOCYTES 5 5 - 13 % EOSINOPHILS 0 0 - 7 % BASOPHILS 0 0 - 1 % IMMATURE GRANULOCYTES 0 0.0 - 0.5 % ABS. NEUTROPHILS 10.5 (H) 1.8 - 8.0 K/UL  
 ABS. LYMPHOCYTES 0.8 0.8 - 3.5 K/UL ABS. MONOCYTES 0.6 0.0 - 1.0 K/UL  
 ABS. EOSINOPHILS 0.0 0.0 - 0.4 K/UL  
 ABS. BASOPHILS 0.0 0.0 - 0.1 K/UL  
 ABS. IMM. GRANS. 0.0 0.00 - 0.04 K/UL  
 DF AUTOMATED METABOLIC PANEL, COMPREHENSIVE Collection Time: 05/02/20 10:23 PM  
Result Value Ref Range Sodium 140 136 - 145 mmol/L Potassium 3.5 3.5 - 5.1 mmol/L Chloride 107 97 - 108 mmol/L  
 CO2 24 21 - 32 mmol/L Anion gap 9 5 - 15 mmol/L Glucose 92 65 - 100 mg/dL BUN 13 6 - 20 MG/DL Creatinine 0.80 0.55 - 1.02 MG/DL  
 BUN/Creatinine ratio 16 12 - 20 GFR est AA >60 >60 ml/min/1.73m2 GFR est non-AA >60 >60 ml/min/1.73m2 Calcium 9.2 8.5 - 10.1 MG/DL Bilirubin, total 0.8 0.2 - 1.0 MG/DL  
 ALT (SGPT) 36 12 - 78 U/L  
 AST (SGOT) 17 15 - 37 U/L Alk. phosphatase 135 (H) 45 - 117 U/L Protein, total 7.3 6.4 - 8.2 g/dL Albumin 3.4 (L) 3.5 - 5.0 g/dL Globulin 3.9 2.0 - 4.0 g/dL A-G Ratio 0.9 (L) 1.1 - 2.2 MAGNESIUM Collection Time: 05/02/20 10:23 PM  
Result Value Ref Range Magnesium 1.6 1.6 - 2.4 mg/dL LACTIC ACID Collection Time: 05/02/20 10:23 PM  
Result Value Ref Range Lactic acid 2.6 (HH) 0.4 - 2.0 MMOL/L  
TROPONIN I Collection Time: 05/02/20 10:23 PM  
Result Value Ref Range Troponin-I, Qt. <0.05 <0.05 ng/mL NT-PRO BNP Collection Time: 05/02/20 10:23 PM  
Result Value Ref Range NT pro-BNP 4,772 (H) <125 PG/ML  
SAMPLES BEING HELD Collection Time: 05/02/20 10:23 PM  
Result Value Ref Range SAMPLES BEING HELD    
  2 RED TUBES, 1 BLUE TUBE, 2 PEDIATRIC BLOOD CULTURES  
 COMMENT Add-on orders for these samples will be processed based on acceptable specimen integrity and analyte stability, which may vary by analyte. D DIMER Collection Time: 05/02/20 10:23 PM  
Result Value Ref Range D-dimer 30.85 (H) 0.00 - 0.65 mg/L FEU  
LD Collection Time: 05/02/20 10:23 PM  
Result Value Ref Range   (H) 81 - 246 U/L  
PROCALCITONIN  
 Collection Time: 05/02/20 10:23 PM  
Result Value Ref Range Procalcitonin 0.42 ng/mL PROTHROMBIN TIME + INR Collection Time: 05/02/20 10:23 PM  
Result Value Ref Range INR 1.0 0.9 - 1.1 Prothrombin time 10.9 9.0 - 11.1 sec CULTURE, BLOOD, PAIRED Collection Time: 05/02/20 10:30 PM  
Result Value Ref Range Special Requests: NO SPECIAL REQUESTS Culture result: NO GROWTH AFTER 6 HOURS    
SARS-COV-2 Collection Time: 05/02/20 10:30 PM  
Result Value Ref Range Specimen source Nasopharyngeal    
 SARS-CoV-2 PENDING   
 SARS-CoV-2 PENDING   
 COVID-19 PENDING NEG  
EKG, 12 LEAD, INITIAL Collection Time: 05/02/20 10:41 PM  
Result Value Ref Range Ventricular Rate 88 BPM  
 Atrial Rate 91 BPM  
 QRS Duration 90 ms Q-T Interval 390 ms QTC Calculation (Bezet) 471 ms Calculated R Axis -52 degrees Calculated T Axis 87 degrees Diagnosis Atrial fibrillation with a competing junctional pacemaker Left axis deviation No previous ECGs available URINALYSIS W/ REFLEX CULTURE Collection Time: 05/02/20 11:18 PM  
Result Value Ref Range Color YELLOW/STRAW Appearance CLOUDY (A) CLEAR Specific gravity 1.011 1.003 - 1.030    
 pH (UA) 7.0 5.0 - 8.0 Protein 300 (A) NEG mg/dL Glucose 100 (A) NEG mg/dL Ketone Negative NEG mg/dL Bilirubin Negative NEG Blood SMALL (A) NEG Urobilinogen 1.0 0.2 - 1.0 EU/dL Nitrites Negative NEG Leukocyte Esterase Negative NEG    
 WBC 0-4 0 - 4 /hpf  
 RBC 0-5 0 - 5 /hpf Epithelial cells FEW FEW /lpf Bacteria 2+ (A) NEG /hpf  
 UA:UC IF INDICATED URINE CULTURE ORDERED (A) CNI Hyaline cast 0-2 0 - 5 /lpf LACTIC ACID Collection Time: 05/03/20  2:27 AM  
Result Value Ref Range Lactic acid 4.2 (HH) 0.4 - 2.0 MMOL/L  
PROTHROMBIN TIME + INR Collection Time: 05/03/20  2:27 AM  
Result Value Ref Range INR 1.1 0.9 - 1.1 Prothrombin time 11.4 (H) 9.0 - 11.1 sec TYPE & SCREEN  
 Collection Time: 05/03/20  2:27 AM  
Result Value Ref Range Crossmatch Expiration 05/06/2020 ABO/Rh(D) O POSITIVE Antibody screen NEG   
LACTIC ACID Collection Time: 05/03/20  4:36 AM  
Result Value Ref Range Lactic acid 1.7 0.4 - 2.0 MMOL/L Impression:  
 
Patient Active Problem List  
 Diagnosis Date Noted  Closed right hip fracture ([facility] [address] 05/03/2020 Active Problems: 
  Closed right hip fracture ([facility] [address] (5/3/2020) Plan:  
-  Plan for ORIF of femur fracture with Dr. Jonas Hylton  
-  NPO, bedrest, gil, IVF, pain management -  Awaiting medical clearance  
-  DVT Prophylaxis - SCDs pre op with heparin -  Possible UTI (POA) - UA noted cloudy urine with 2+ bacteria, urine culture pending was given IV abx in ED  
-  Fever on admission to ED - Covid Test pending Plan of care discussed with Chano Madrigal (pts daughter), all questions/concerns addressed and pt and family verbalized understanding of plan of care. [phone] Dr. Jonas Hylton aware and agrees with plan as above. Ian Poe, AI Orthopedic Nurse Practitioner [employer]

## 2020-05-04 PROBLEM — E11.9 DIABETES MELLITUS TYPE 2, CONTROLLED (HCC): Status: ACTIVE | Noted: 2020-01-01

## 2020-05-04 PROBLEM — I10 HYPERTENSION: Status: ACTIVE | Noted: 2020-01-01

## 2020-05-04 PROBLEM — I25.10 CORONARY ARTERY DISEASE INVOLVING NATIVE CORONARY ARTERY: Status: ACTIVE | Noted: 2020-01-01

## 2020-05-04 NOTE — CONSULTS
[address] [facility] Date of  Admission: 5/2/2020  9:34 PM  
 
Admission type:Emergency Consult for: hx of afib, CAD, surgical clearance Consult by: hospitalist 
 
Did not perform physical exam as she is pending COVID19 test, discussed with hospitalist and RN Subjective: Loki Bergman is a 78 y.o. female admitted for Closed right hip fracture ([address] [place]. Demented, somnolent. Admitted from nursing home with right hip pain, fracture. Per chart has multiple falls. Patient is demented. ECG SR with PACs, 1st degree HB, no acute changes NT proBNP 5400 DDimer 30 on admission, has decreased to 9. DVT neg, CT chest with mild pl effusions, no evaluation for PE with a CTA COVID19 pending PMH: CAD - Hx of cath 5/2019 with significant CAD, medical management Occlusion of the mid to distal LAD with collateralization, medical management;  HTN, DM, Previous treatment/evaluation includes echocardiogram and positive stress test, cardiac catheterization . Cardiac risk factors: dyslipidemia, diabetes mellitus, sedentary life style, hypertension. Patient Active Problem List  
 Diagnosis Date Noted  Coronary artery disease involving native coronary artery 05/04/2020  Diabetes mellitus type 2, controlled ([address] 05/04/2020  Hypertension 05/04/2020  Dementia ([address]  Hyperlipemia  Closed right hip fracture ([address] 05/03/2020 Other, MD Richard 
Past Medical History:  
Diagnosis Date  CAD (coronary artery disease)  Coronary artery disease involving native coronary artery 5/4/2020 5/2019 at [facility] cardiac cath with  to mid-distal LAD, med management  Dementia ([address]  Diabetes ([address]  Diabetes mellitus type 2, controlled ([address] 5/4/2020  Hyperlipemia  Hypertension  Hypertension 5/4/2020 Social History Socioeconomic History  Marital status:  Spouse name: Not on file  Number of children: Not on file  Years of education: Not on file  Highest education level: Not on file Tobacco Use  Smoking status: Unknown If Ever Smoked  Smokeless tobacco: Never Used Substance and Sexual Activity  Alcohol use: Not Currently No Known Allergies History reviewed. No pertinent family history. Current Facility-Administered Medications Medication Dose Route Frequency  ondansetron (ZOFRAN) injection 4 mg  4 mg IntraVENous Q4H PRN  
 sodium chloride (NS) flush 5-40 mL  5-40 mL IntraVENous Q8H  
 sodium chloride (NS) flush 5-40 mL  5-40 mL IntraVENous PRN  
 heparin (porcine) injection 5,000 Units  5,000 Units SubCUTAneous Q8H  
 traMADoL (ULTRAM) tablet 50 mg  50 mg Oral Q6H PRN  
 fentaNYL citrate (PF) injection 25 mcg  25 mcg IntraVENous Q4H PRN  
 atorvastatin (LIPITOR) tablet 40 mg  40 mg Oral DAILY  donepeziL (ARICEPT) tablet 5 mg  5 mg Oral QHS  sodium chloride (NS) flush 5-40 mL  5-40 mL IntraVENous Q8H  
 sodium chloride (NS) flush 5-40 mL  5-40 mL IntraVENous PRN  
 acetaminophen (TYLENOL) tablet 650 mg  650 mg Oral Q6H  
 oxyCODONE IR (ROXICODONE) tablet 2.5 mg  2.5 mg Oral Q4H PRN  
 oxyCODONE IR (ROXICODONE) tablet 5 mg  5 mg Oral Q4H PRN  
 naloxone (NARCAN) injection 0.4 mg  0.4 mg IntraVENous PRN  
 senna-docusate (PERICOLACE) 8.6-50 mg per tablet 2 Tab  2 Tab Oral BID  labetaloL (NORMODYNE;TRANDATE) injection 20 mg  20 mg IntraVENous Q6H PRN  
 ascorbic acid (vitamin C) (VITAMIN C) tablet 500 mg  500 mg Oral BID  zinc sulfate (ZINCATE) 220 (50) mg capsule 1 Cap  1 Cap Oral DAILY  metoprolol tartrate (LOPRESSOR) tablet 75 mg  75 mg Oral BID  amLODIPine (NORVASC) tablet 2.5 mg  2.5 mg Oral DAILY  cefTRIAXone (ROCEPHIN) 1 g in 0.9% sodium chloride (MBP/ADV) 50 mL  1 g IntraVENous Q24H  
 glucose chewable tablet 16 g  4 Tab Oral PRN  
 dextrose (D50W) injection syrg 12.5-25 g  25-50 mL IntraVENous PRN  
  glucagon (GLUCAGEN) injection 1 mg  1 mg IntraMUSCular PRN  
 insulin lispro (HUMALOG) injection   SubCUTAneous AC&HS  acetaminophen (TYLENOL) tablet 650 mg  650 mg Oral Q6H PRN Or  
 acetaminophen (TYLENOL) suppository 650 mg  650 mg Rectal Q6H PRN Review of Symptoms: unable to obtain due to dementia Objective:  
  
Visit Vitals /63 (BP 1 Location: Right arm, BP Patient Position: At rest) Pulse 84 Temp 98.5 °F (36.9 °C) Resp 20 Ht 5' 3\" (1.6 m) Wt 61.1 kg (134 lb 11.2 oz) SpO2 92% BMI 23.86 kg/m² Physical: did not perform physical assessment due to COVID19 General: observed AAF in no acute distress Data Review:  
Recent Labs 05/03/20 
1131 05/02/20 
2223 WBC 9.7 12.0* HGB 8.7* 9.8* HCT 26.4* 29.4*  
 434* Recent Labs 05/03/20 
1131 05/03/20 
0227 05/02/20 
2223   --  140  
K 3.8  --  3.5   --  107 CO2 23  --  24  
*  --  92 BUN 15  --  13  
CREA 0.77  --  0.80 CA 8.6  --  9.2 MG  --   --  1.6 ALB 3.0*  --  3.4* TBILI 0.7  --  0.8 SGOT 17  --  17 ALT 28  --  36 INR  --  1.1 1.0 Recent Labs 05/02/20 
2223 TROIQ <0.05 Intake/Output Summary (Last 24 hours) at 5/4/2020 0906 Last data filed at 5/3/2020 2000 Gross per 24 hour Intake 480 ml Output 500 ml Net -20 ml  
  
 
Cardiographics Telemetry: SR with PACs, 1st deg block, no pauses ECG: SR with PACs, 1st deg Echocardiogram: 5/3/2018 · Normal cavity size and systolic function (ejection fraction normal). Mild to moderate hypertrophy. Estimated left ventricular ejection fraction is 65 - 70%. · Mitral valve non-specific thickening (? Rheumatic changes). Mild to moderate mitral valve regurgitation is present. · Small pericardial effusion. · Mildly dilated left atrium. Assessment:  
  
 Principal Problem: 
  Closed right hip fracture ([address] (5/3/2020) Active Problems: Coronary artery disease involving native coronary artery (5/4/2020) Overview: 5/2019 at [facility] cardiac cath with  to mid-distal  
    LAD, med management Diabetes mellitus type 2, controlled ([facility] [facility] (5/4/2020) Hypertension (5/4/2020) Dementia ([facility] [address] () Hyperlipemia () Plan:  
 
CAD with known  of LAD with collaterals: 
Stable, cath at [facility] 5/2019 for [place], +stress test which led to cardiac cath. She was not deemed a candidate for PCI or CABG. Patient has continued PTA on Plavix, metoprolol and lipitor. No ASA on PTA, unsure why. Recommend continue on BB and statin, and restart Plavix and ASA post surgery Currently COVID19 test pending, would not proceed with surgery until cleared. Pre-op CV risk assessment: 
Cardiac Clearance for Surgery - RCRI 2.4% & 3.6% EKG NSR with 1st deg block EF 21-44%, mild diastolic dysfunction Hx of HTN, DM, CAD 
RCRI 2.4% - Rate of cardiac death, nonfatal MI, nonfatal cardiac arrest  with no risk factors according to revised cardiac risk intex RCRI 3.6% - rate of MI, pulm edema, v fib, primary cardiac arrest and complete HB according to revised cardiac risk index The patient is moderate risk, but family understands and does not want her to be bedridden. OK to proceed with surgery from cardiac standpoint once cleared COVID19 Thank you for consulting RCA Mary Florentino NP [facility] 5/4/2020 Patient seen, examined by me personally. Plan discussed as detailed. Agree with note as outlined by  NP. I confirm findings in history and physical exam. No additional findings noted. Agree with plan as outlined above. Moderate risk due to her underlying CAD, debility. On optimal therapy. COVID test pending. Okay to proceed from cardiac stand point when appropriate.  
 
Roby Arreola MD

## 2020-05-04 NOTE — PROGRESS NOTES
Pharmacy Automatic Renal Dosing Protocol - Antimicrobials Indication for Antimicrobials: Enterococcus UTI Current Regimen of Each Antimicrobial: 
Amoxicillin 500mg IV Q 6hr  (Start Date 20; Day # 1/5) Previous Antimicrobial Therapy: 
Ceftriaxone 1G IV Daily (Start Date 5/3/20; Day 2 Significant Cultures:  
-Urine: >100,000 Probable Enterococcus 5/3- Blood NG P 
- Resp Panel neg 
- SARS-COV-2 Pending Labs: 
Recent Labs 20 
1131 20 
2223 CREA 0.77 0.80 BUN 15 13 WBC 9.7 12.0* Temp (24hrs), Av.5 °F (36.9 °C), Min:98 °F (36.7 °C), Max:98.9 °F (37.2 °C) Creatinine Clearance (mL/min) or Dialysis: 56.2ml/min Impression/Plan: Antimicrobial as above Antimicrobial stop date 20 Pharmacy will follow daily and adjust medications as appropriate for renal function and/or serum levels. Thank you, 
BETZAIDA Lebron [facility] Recommended duration of therapy 
http://[facility]/[place]/[facility]/Pharmacy/Clinical%20Companion/Duration%20of%20ABX%20therapy. docx Renal Dosing 
http://[facility]/[place]/[facility]/Pharmacy/Clinical%20Companion/Renal%[id number]. pdf

## 2020-05-04 NOTE — CDMP QUERY
Pt admitted with right hip fx. Pt noted to have +ua/cx. If possible, please document in the progress notes and d/c summary if you are evaluating and / or treating any of the following: 
 
? Urinary Tract Infection (UTI) ? Bacteriuria 
? Other, please specify ? Clinically unable to determine The medical record reflects the following: 
  Risk Factors: dementia, DM Clinical Indicators: 5/4 pn:  U/A (-) nitrites, leukocyte esterase with 2+ bacteria; urine cx pending 
  ->meanwhile, we will continue with IV rocephin daily for now, ua 2 bact, few epith, cx: PROBABLE ENTEROCOCCUS SPECIES Treatment: Rocephin Thanks, Leif Pritchard Rn/CDI

## 2020-05-04 NOTE — ROUTINE PROCESS
Bedside and Verbal shift change report given to Marquis Aguirre RN (oncoming nurse) by Kamar Almendarez RN  (offgoing nurse). Report included the following information SBAR, Kardex, Intake/Output, MAR, Recent Results and Med Rec Status.

## 2020-05-04 NOTE — PROGRESS NOTES
Pharmacy Automatic Renal Dosing Protocol - Antimicrobials Indication for Antimicrobials: Enterococcus UTI Current Regimen of Each Antimicrobial: 
Amoxicillin 500mg PO Q 12hr  (Start Date 20; Day # 1/5) Previous Antimicrobial Therapy: 
Ceftriaxone 1G IV Daily (Start Date 5/3/20; Day 2 Significant Cultures:  
-Urine: >100,000 Probable Enterococcus 5/3- Blood NG P 
- Resp Panel neg 
- SARS-COV-2 Pending Labs: 
Recent Labs 20 
1131 20 
2223 CREA 0.77 0.80 BUN 15 13 WBC 9.7 12.0* Temp (24hrs), Av.4 °F (36.9 °C), Min:98 °F (36.7 °C), Max:98.6 °F (37 °C) Creatinine Clearance (mL/min) or Dialysis: 56.2ml/min Impression/Plan:  
Recommended to change Ceftriaxone to Amoxicillin for probable Enterococcus UTI Antimicrobial stop date 20 Pharmacy will follow daily and adjust medications as appropriate for renal function and/or serum levels. Thank you, Eloisa Gao, PHARMD 
 
Recommended duration of therapy 
http://[facility]/[place]/[facility]/Pharmacy/Clinical%20Companion/Duration%20of%20ABX%20therapy. docx Renal Dosing 
http://[facility]/[place]/[facility]/Pharmacy/Clinical%20Companion/Renal%[id number]. pdf

## 2020-05-04 NOTE — PROGRESS NOTES
Hospitalist Progress Note NAME: Gerber Escalera :  1950 MRN:  [id number] I reviewed with Dr. Susu Mccord about the medical history and the findings on the physical examination. I discussed with Dr. Susu Mccord the patient's diagnosis and concur with the plan. Interim Hospital Summary: 78 y.o. female whom presented on 2020 with dementia, DM, HTN, hyperlipidemia, CAD, who presents from a nursing home with right hip pain. She doesn't provide any history. She had multiple falls yesterday and was noted to have a fever and a cough. X-rays were taken there and reportedly showed a right hip fracture Assessment / Plan: 
COVID 19 rule out; febrile episode, elevated inflammatory markers, LTC resident 
- COVID 19 pending EKG: A-fib with junctional rhythm, QTC 371ms LFT: , AST and ALT within normal range CT chest without contrast: Small pleural effusions, right greater than left. Centrilobular emphysema. No lung mass or consolidation. Moderate cardiomegaly. Nodular thyroid gland. Ferritin 141, CRP 5.49 -320 
   procal 0.42 Troponin <0.05 
   D-dimer 30.85->9.0 Duplex lower extremities (-) DVT IL 6 to be done No Hydroxychloroquine; elderly, a-fib, hypertension. Continue with statin Vitamin C 500mg po BID Zinc 220mg PO BID Urine cx growing PROBABLE ENTEROCOCCUS SPECIES  
  -> IV rocephin was changed to IV ampicillin; will complete total 5 days Blood cx no growth so far 
   resp panel (-) Lactic acidosis resolved Acute right femoral intertrochanteric fracture - CT pel: Acute nondisplaced right femoral intertrochanteric fracture Plan for surgery tomorrow if COVID 19 testing is (-) per Dr. Tina Pak Elevated D-dimer, persistent a-fib; high risk of having embolic event. Persistent A-fib Persistent Hypertension CAD Hyperlipidemia 
- continue with BB with parameter Add Norvasc    Labetalol IV PRN 
 AC will be discussed postoperatively once she recovers from surgery NT pro-BNP [id number] Echo(5/3): Normal cavity size and systolic function (ejection fraction normal). Mild to moderate hypertrophy. Estimated left ventricular ejection fraction is 65 - 70% Plavix on hold. Continue with statin Appreciate cardiology input; spoke with Dr. Ruby Zarate. Usual cardiologist - Dr. Rylie Galloway Type 2 DM  
- A1C 9.0 Check qac/qhs blood glucose and follow SSI Hx of CVA 
- residual weakness in left side; two strokes in the past 
  Continue with statin 
  plavix on hold for surgery Dementia  
- baseline; knows herself and place, able to tell daughter's name Lactic acidosis; resolved peak 4.7 -> 1.7 Estimated body mass index is 23.86 kg/m² as calculated from the following: 
  Height as of this encounter: 5' 3\" (1.6 m). Weight as of this encounter: 61.1 kg (134 lb 11.2 oz). Code status: DNR Prophylaxis: SCD's Recommended Disposition: LTC Pt's Ethan Ramon ([phone]) Updated medical information, answered, addressed all of her questions on 5/3/2020 [place] DNR/DNI, proceed with surgery b/c she doesn't want her mom (pt) to be in bedridden. Understands the poor & challenging recovery Subjective: Chief Complaint / Reason for Physician Visit Alert and orient to self and palce. Less talkative than yesterday. Discussed with RN events overnight. Review of Systems: 
Symptom Y/N Comments  Symptom Y/N Comments Fever/Chills n   Chest Pain n   
Poor Appetite    Edema Cough    Abdominal Pain n   
Sputum    Joint Pain y   
SOB/MAGUIRE n   Pruritis/Rash Nausea/vomit n   Tolerating PT/OT Diarrhea n   Tolerating Diet Constipation n   Other Could NOT obtain due to:   
 
Objective: VITALS:  
Last 24hrs VS reviewed since prior progress note. Most recent are: 
Patient Vitals for the past 24 hrs: 
 Temp Pulse Resp BP SpO2 05/04/20 0001 98.4 °F (36.9 °C) 70 18 187/78 96 % 05/03/20 1913 98 °F (36.7 °C) 78 18 156/55 96 % 05/03/20 1600  74     
05/03/20 1458 98.4 °F (36.9 °C) 71 17 130/88 97 % 05/03/20 1208 98.5 °F (36.9 °C) 64 16 174/57 96 % 05/03/20 0747 98.2 °F (36.8 °C) 72 18 186/70 98 % Intake/Output Summary (Last 24 hours) at 5/4/2020 5206 Last data filed at 5/3/2020 2000 Gross per 24 hour Intake 480 ml Output 500 ml Net -20 ml PHYSICAL EXAM: 
General: Ill appearing. Alert, uncooperative at times, no acute distress, in fetal position when entered the room EENT:  EOMI. Anicteric sclerae. MMM Resp:  CTA bilaterally, no wheezing or rales. No accessory muscle use CV:             irregular  rhythm,  No edema GI:  Soft, Non distended, Non tender. +Bowel sounds Neurologic:  Alert and oriented X self and place, normal speech, Psych:   Poor insight. became restless during visit Skin:  No rashes. No jaundice Reviewed most current lab test results and cultures  YES Reviewed most current radiology test results   YES Review and summation of old records today    NO Reviewed patient's current orders and MAR    YES 
PMH/ reviewed - no change compared to H&P 
________________________________________________________________________ Care Plan discussed with: 
  Comments Patient y Family RN y   
Care Manager Consultant Multidiciplinary team rounds were held today with , nursing, pharmacist and clinical coordinator. Patient's plan of care was discussed; medications were reviewed and discharge planning was addressed. ________________________________________________________________________ Светлана Lombardo NP Procedures: see electronic medical records for all procedures/Xrays and details which were not copied into this note but were reviewed prior to creation of Plan. LABS: 
I reviewed today's most current labs and imaging studies. Pertinent labs include: 
Recent Labs 05/03/20 1131 05/02/20 
2223 WBC 9.7 12.0* HGB 8.7* 9.8* HCT 26.4* 29.4*  
 434* Recent Labs 05/03/20 
1131 05/03/20 0227 05/02/20 
2223   --  140  
K 3.8  --  3.5   --  107 CO2 23  --  24  
*  --  92 BUN 15  --  13  
CREA 0.77  --  0.80 CA 8.6  --  9.2 MG  --   --  1.6 ALB 3.0*  --  3.4* TBILI 0.7  --  0.8 SGOT 17  --  17 ALT 28  --  36 INR  --  1.1 1.0 Signed: )Darlene Cowart, NP

## 2020-05-05 NOTE — PROGRESS NOTES
Initial Nutrition Assessment: 
 
INTERVENTIONS/RECOMMENDATIONS:  
· Consider SLP consult to determine most appropriate texture modifications · Continue carb restrictions · Glucerna BID and ensure pudding daily · Please document % meals and supplements consumed in flowsheet I/O's under intake ASSESSMENT:  
Chart reviewed, medically noted for s/p Intramedullary fixation right hip fracture, dementia, T2DM, CAD and PMH shown below. Assessment conduct via chart review due to dementia. No weights in weight history to assess recent weight loss. Her BMI is in normal range. Will add glucerna shakes for additional kcal and protein to aid in fracture and post op healing. Currently on pureed diet, recommend consulting SLP to determine best consistency for pt. Past Medical History:  
Diagnosis Date  CAD (coronary artery disease)  Coronary artery disease involving native coronary artery 5/4/2020 5/2019 at [facility] cardiac cath with  to mid-distal LAD, med management  Dementia [address] [address]  Diabetes ([facility] [address] [facility]  Diabetes mellitus type 2, controlled ([facility] [address] [facility] 5/4/2020  Hyperlipemia  Hypertension  Hypertension 5/4/2020 Diet Order: Consistent carb, Puree 
% Eaten:   
Patient Vitals for the past 72 hrs: 
 % Diet Eaten 05/03/20 2000 100 % Pertinent Medications: [x]Reviewed: vit C, os-ruperto, humalog, miralax, pericolace, zinc 
Pertinent Labs: [x]Reviewed: BG>200, HbA1c 9.0 Food Allergies: [x]NKFA  []Other Last BM: PTA Edema:  n/a  []RUE   []LUE   []RLE   []LLE Pressure Injury:  n/a    [] Stage I   [] Stage II   [] Stage III   [] Stage IV Wt Readings from Last 30 Encounters:  
05/02/20 61.1 kg (134 lb 11.2 oz) Anthropometrics:  
Height: 5' 3\" (160 cm) Weight: 61.1 kg (134 lb 11.2 oz) IBW (%IBW):   ( ) UBW (%UBW):   (  %) Last Weight Metrics: 
Weight Loss Metrics 5/2/2020 Today's Wt 134 lb 11.2 oz  
BMI 23.86 kg/m2 BMI: Body mass index is 23.86 kg/m². This BMI is indicative of: 
 []Underweight    [x]Normal    []Overweight    [] Obesity   [] Extreme Obesity (BMI>40) Estimated Nutrition Needs (Based on):  
1425 Kcals/day(BMR: 1100 x 1.3) , 70 g(1.2 g/kg) Protein Carbohydrate: At Least 130 g/day  Fluids: 1425 mL/day (1ml/kcal) or per primary team 
 
NUTRITION DIAGNOSES:  
Problem:  Increased nutrient needs Etiology: related to fracture and post op healing Signs/Symptoms: as evidenced by s/p Intramedullary fixation right hip fracture NUTRITION INTERVENTIONS: 
Meals/Snacks: General/healthful diet   Supplements: Commercial supplement GOAL:  
consume >50% of meals and ONS 
 
LEARNING NEEDS (Diet, Food/Nutrient-Drug Interaction):  
 [x] None Identified 
 [] Identified and Education Provided/Documented 
 [] Identified and Pt declined/was not appropriate Cultureal, Yazdanism, OR Ethnic Dietary Needs:  
 [x] None Identified 
 [] Identified and Addressed 
 
 [x] Interdisciplinary Care Plan Reviewed/Documented  
 [x] Discharge Planning: Consistent carb diet, kcal/protein dense nutrition supplements BID. Texture modifications as needed MONITORING /EVALUATION:  
  
Food/Nutrient Intake Outcomes: Total energy intake Physical Signs/Symptoms Outcomes: Weight/weight change, Electrolyte and renal profile, Glucose profile, GI 
 
NUTRITION RISK:  
 [x] High     to         [x] Moderate           []  Low  []  Minimal/Uncompromised PT SEEN FOR:  
 [x]  MD Consult: []Calorie Count []Diabetic Diet Education []Diet Education []Electrolyte Management 
   [x]General Nutrition Management and Supplements []Management of Tube Feeding []TPN Recommendations []  RN Referral:  []MST score >=2 
   []Enteral/Parenteral Nutrition PTA    []Pregnant: Gestational DM or Multigestation 
   []Pressure Ulcer/Wound Care needs 
     
[]  Low BMI 
[]  LOS Referral  
 
 
Mohsen Bowles RDN 
 Pager [phone] Weekend Pager [phone]

## 2020-05-05 NOTE — PROGRESS NOTES
I get paged about patient NPO , case was discussed with nurse , diet order was placed puree diet and ask nurse to do swallowing evaluations before start diet

## 2020-05-05 NOTE — ANESTHESIA POSTPROCEDURE EVALUATION
Procedure(s): RIGHT FEMUR INSERTION INTRA MEDULLARY NAIL. general 
 
Anesthesia Post Evaluation Patient location during evaluation: PACU Note status: Adequate. Level of consciousness: responsive to verbal stimuli and sleepy but conscious Pain management: satisfactory to patient Airway patency: patent Anesthetic complications: no 
Cardiovascular status: acceptable Respiratory status: acceptable Hydration status: acceptable Comments: +Post-Anesthesia Evaluation and Assessment Patient: Pascual Rutherford MRN: [id number]  SSN: [id number] YOB: 1950  Age: 78 y.o. Sex: female Cardiovascular Function/Vital Signs /86   Pulse 96   Temp 36.8 °C (98.3 °F)   Resp 20   Ht 5' 3\" (1.6 m)   Wt 61.1 kg (134 lb 11.2 oz)   SpO2 100%   BMI 23.86 kg/m² Patient is status post Procedure(s): RIGHT FEMUR INSERTION INTRA MEDULLARY NAIL. Nausea/Vomiting: Controlled. Postoperative hydration reviewed and adequate. Pain: 
Pain Scale 1: FLACC (05/04/20 2140) Pain Intensity 1: 2 (05/04/20 2140) Managed. Neurological Status:  
Neuro (WDL): Exceptions to WDL (05/04/20 2057) At baseline. Mental Status and Level of Consciousness: Arousable. Pulmonary Status:  
O2 Device: Nasal cannula (05/04/20 2140) Adequate oxygenation and airway patent. Complications related to anesthesia: None Post-anesthesia assessment completed. No concerns. Signed By: Kaylin Dai MD  
 5/4/2020 Post anesthesia nausea and vomiting:  controlled Vitals Value Taken Time /86 5/4/2020  9:40 PM  
Temp 36.8 °C (98.3 °F) 5/4/2020  9:40 PM  
Pulse 102 5/4/2020  9:45 PM  
Resp 18 5/4/2020  9:45 PM  
SpO2 100 % 5/4/2020  9:45 PM  
Vitals shown include unvalidated device data.

## 2020-05-05 NOTE — PROGRESS NOTES
RAMOS: return to LTC vs home with daughter Reason for Admission:  Right hip fracture RUR Score: 13% Plan for utilizing home health: per recommendation PCP: First and Last name:  Patient is seen by PCP at [facility] Name of Practice:  
 Are you a current patient: Yes/No:  
 Approximate date of last visit:  
                 
Current Advanced Directive/Advance Care Plan: patient does not have an ACP on file but has a DDNR Transition of Care Plan:                   
 
Patient is a 79 y/o female who was admitted to [facility] on 5/3/2020 for right hip fracture after a GLF at [facility]. Patient has pmhx of dementia, HTN, CAD, DM, and hyperlipidemia. CM contacted patient's daughter Lisa Louise [phone] to complete assessment. Patient's daughter confirmed that patient is a LTC resident at [facility]. Daughter expressed high concern regarding patient's care at [facility] as this is patient's 6th fall since she has been at the facility. Daughter does not want patient to return to [place]. CM explained that typically CM is unable to get patient to a different LTC facility from hospital and patient's d/c back to LTC facility that patient came from. Then, the LTC facility can arrange for patient to be transferred to another facility of family's choice. Daughter reported that she will bring patient home with her before she allows patient to return to [address] [place] [address]. Patient's daughter also mentioned that she works full time. Daughter would like for patient to go to a LTC facility in [place] where daughter's second home is and spends most of her time at. Daughter reported that she has already reached out to patient's  at [address] [place] [address] requesting assistance on transitioning patient's insurance from [place] to [place].  Patient typically uses a RW for ambulation but unsure of patient's functional ability post hip fracture and repair. Patient is dependent with all ADLs. Care Management Interventions PCP Verified by CM: Yes Mode of Transport at Discharge: BLS Transition of Care Consult (CM Consult): Discharge Planning Discharge Durable Medical Equipment: No 
Physical Therapy Consult: Yes Occupational Therapy Consult: Yes Speech Therapy Consult: No 
Current Support Network: Nursing Facility([facility] ) Confirm Follow Up Transport: Family Discharge Location Discharge Placement: (TBD) [facility], [address], [address] [facility]

## 2020-05-05 NOTE — PROGRESS NOTES
Problem: Self Care Deficits Care Plan (Adult) Goal: *Acute Goals and Plan of Care (Insert Text) Description:  
 
FUNCTIONAL STATUS PRIOR TO ADMISSION: Patient resides in [address] [place] [address], with some indication in chart that patient was able to ambulate, but has had multiple falls. Her ADL status is unknown at this time due to patient being unable to report. HOME SUPPORT: Resides in LTC. Occupational Therapy Goals Initiated 5/5/2020 1. Patient will perform grooming seated EOB with moderate assistance  within 7 day(s). 2.  Patient will perform upper body dressing with maximal assistance within 7 day(s). 3.  Patient will perform lower body dressing with maximal assistance within 7 day(s). 4.  Patient will perform toilet/BSC transfers with moderate assistance  within 7 day(s). 5.  Patient will perform all aspects of toileting with maximal assistance within 7 day(s). 6.  Patient will perform sponge bathing with maximal assistance within 7 day(s). Outcome: Progressing Towards Goal 
 
OCCUPATIONAL THERAPY EVALUATION Patient: Megha Cleary (78 y.o. female) Date: 5/5/2020 Primary Diagnosis: Closed right hip fracture ([facility] [address] [S72.001A] Procedure(s) (LRB): 
RIGHT FEMUR INSERTION INTRA MEDULLARY NAIL (Right) 1 Day Post-Op Precautions: WBAT, Falls, Droplet Plus ASSESSMENT Based on the objective data described below, the patient presents with significant cognitive impairments, GW, decreased activity tolerance, decreased safety awareness and decreased balance which is impairing her functional independence. Patient unable to specifically identify the location of her pain, but was noted to be experiencing the expected RLE pain during LB ADL performance and when in standing. Her ability to maintain attention and ability to follow commands for adequate participation in therapy is significantly impaired.   The patient's functional baseline is unknown due to her inability to report, but she is from LTC and it was noted in the chart that she was ambulatory with frequent falls. At present the patient is total A for ADLs and is mod A of 2 to mod/max A of 2 for functional mobility. The patient will benefit from acute OT intervention on a trial basis, but I anticipate that her significant cognitive impairments will prevent her from effectively participating in therapy. .  
 
Functional Outcome Measure: The patient scored 5/100 on the Barthel Index outcome measure which is indicative of a 95% impairment in function. PLAN : 
Recommendations and Planned Interventions: self care training, functional mobility training, balance training, therapeutic activities, cognitive retraining, endurance activities, and patient education Frequency/Duration: Patient will be followed by occupational therapy 2 times a week to address goals. Recommendation for discharge: (in order for the patient to meet his/her long term goals) [facility] recommendations for successful discharge (if) home: TBD OBJECTIVE DATA SUMMARY:  
HISTORY:  
Past Medical History:  
Diagnosis Date CAD (coronary artery disease) Coronary artery disease involving native coronary artery 5/4/2020 5/2019 at [facility] cardiac cath with  to mid-distal LAD, med management Dementia ([address] Diabetes ([facility] [address] Diabetes mellitus type 2, controlled ([address] 5/4/2020 Hyperlipemia Hypertension Hypertension 5/4/2020 History reviewed. No pertinent surgical history. Expanded or extensive additional review of patient history:  
 
Home Situation Home Environment: Long term care(Resident at [facility]) One/Two Story Residence: One story Hand dominance: Right EXAMINATION OF PERFORMANCE DEFICITS: 
Cognitive/Behavioral Status: 
Neurologic State: Alert;Confused Orientation Level: Oriented to person;Disoriented to place; Disoriented to situation;Disoriented to time Cognition: Decreased attention/concentration;Decreased command following; Impaired decision making; Impulsive;Memory loss;Poor safety awareness(impaired processing, delayed initiation.) Perception: Appears intact Perseveration: No perseveration noted Safety/Judgement: Lack of insight into deficits Cognitive Retraining Organizing/Sequencing: Two step sequence Attention to Task: Distractibility; Single task Following Commands: Follows one step commands/directions(inconsistently,  less than 25% of the time) Safety/Judgement: Lack of insight into deficits Cues: Tactile cues provided;Verbal cues provided;Visual cues provided Vision/Perceptual:   
   Appears to be functional. Patient making eye contact and tracking with cueing. Range of Motion: 
AROM: Generally decreased, functional(only as observed) Strength: 
Strength: Generally decreased, functional(only as observed) Coordination: 
Coordination: Generally decreased, functional 
Fine Motor Skills-Upper: Left Impaired;Right Impaired Gross Motor Skills-Upper: Left Impaired;Right Impaired Tone & Sensation: 
Tone: Normal 
  
  
 
Balance: 
Sitting: Impaired Sitting - Static: Good (unsupported) Sitting - Dynamic: Fair (occasional) Standing: Impaired Standing - Static: Poor Standing - Dynamic : Poor Functional Mobility and Transfers for ADLs: 
Bed Mobility: 
Sit to Supine: Moderate assistance;Assist x2 Transfers: 
Sit to Stand: Moderate assistance;Maximum assistance;Assist x2; Additional time Stand to Sit: Moderate assistance;Assist x2 ADL Assessment: 
Feeding: Total assistance Oral Facial Hygiene/Grooming: Total assistance Bathing: Total assistance Upper Body Dressing: Total assistance Lower Body Dressing: Total assistance Toileting: Total assistance Functional Measure: 
Barthel Index: 
 
Bathin Bladder: 0 Bowels: 0 Groomin Dressin Feedin Mobility: 0 Stairs: 0 Toilet Use: 0 Transfer (Bed to Chair and Back): 5 Total: 5/100 Percentage of impairment  
0% 1-19% 20-39% 40-59% 60-79% 80-99% 100% Barthel Score 0-100 100 99-80 79-60 59-40 20-39 1-19 
 0 The Barthel ADL Index: Guidelines 1. The index should be used as a record of what a patient does, not as a record of what a patient could do. 2. The main aim is to establish degree of independence from any help, physical or verbal, however minor and for whatever reason. 3. The need for supervision renders the patient not independent. 4. A patient's performance should be established using the best available evidence. Asking the patient, friends/relatives and nurses are the usual sources, but direct observation and common sense are also important. However direct testing is not needed. 5. Usually the patient's performance over the preceding 24-48 hours is important, but occasionally longer periods will be relevant. 6. Middle categories imply that the patient supplies over 50 per cent of the effort. 7. Use of aids to be independent is allowed. Brian Peña., Barthel, D.W. (1560). Functional evaluation: the Barthel Index. [facility] (14)2. Janet Heart, SHIKHA.F, Patel Lara., Theo Gar., [place], [address] [place] [address] (1999). Measuring the change indisability after inpatient rehabilitation; comparison of the responsiveness of the Barthel Index and Functional [place] Measure. Journal of Neurology, Neurosurgery, and Psychiatry, 66(4), 915-565. Jose Braxton, N.J.A, MIRTHA Romero.HAY, & Mary Jo Baum M.A. (2004.) Assessment of post-stroke quality of life in cost-effectiveness studies: The usefulness of the Barthel Index and the EuroQoL-5D. [facility], 13, 517-09 Pain Rating: 
Patient unable to report 2/2 significant cognitive impairment. Activity Tolerance:  
Poor Please refer to the flowsheet for vital signs taken during this treatment. After treatment patient left in no apparent distress:   
Supine in bed, Call bell within reach, and Bed / chair alarm activated COMMUNICATION/EDUCATION:  
The patients plan of care was discussed with: Physical Therapist and Registered Nurse. Patient is unable to participate in goal setting and plan of care. This patients plan of care is appropriate for delegation to [facility]. Thank you for this referral. 
Tania Chase OTR/L Time Calculation: 29 mins

## 2020-05-05 NOTE — PROGRESS NOTES
Message to Dr. Escobar Mathur regarding patient's COVID result for further orders. Primary RN aware.

## 2020-05-05 NOTE — PROGRESS NOTES
Bedside and Verbal shift change report given to Wanda White [facility] nurse) by Amie Mejía RN  (offgoing nurse).  Report included the following information SBAR, Kardex, Intake/Output, MAR, Recent Results and Med Rec Status.

## 2020-05-05 NOTE — PROGRESS NOTES
George catheter was removed per MD's order. Patient slept approximately 6 hours last night. Patient continue to be restless this morning and attempting to get up, however, she is redirectable and follow instructions.

## 2020-05-05 NOTE — BRIEF OP NOTE
Brief Postoperative Note Patient: Isaiah Zaragoza YOB: 1950 MRN: [id number] Date of Procedure: 5/4/2020 Pre-Op Diagnosis: right hip fracture Post-Op Diagnosis: Same as preoperative diagnosis. Procedure(s): RIGHT FEMUR INSERTION INTRA MEDULLARY NAIL Surgeon(s): 
Rigoberto Brenner MD 
 
Surgical Assistant: None Anesthesia: Other Estimated Blood Loss (mL): less than 50 Complications: None Specimens: * No specimens in log * Implants:  
Implant Name Type Inv. Item Serial No.  Lot No. LRB No. Used Action NAIL TIEN 125D [id number] STRL -- TFNA - SN/A  NAIL TIEN 125D [id number] STRL -- TFNA N/A SYNTHES [place] [id number] Right 1 Implanted Ti Lag Screw   N/A SY[employer] [place] N/A Right 1 Implanted SCR BNE LCK T25 F/IM NL [id number] --  - SN/A  SCR BNE LCK T25 F/IM NL [id number] --  N/A SYNTHES [place] N/A Right 1 Implanted 3.2 Guidewire   N/A SYNTHES [place] N/A Right 1 Implanted 4.2 [place] N/A Right 1 Implanted Drains: * No LDAs found * Findings: IT hip fracture Electronically Signed by Collette Maizes, MD on 5/4/2020 at 8:37 PM

## 2020-05-05 NOTE — PROGRESS NOTES
Problem: Falls - Risk of 
Goal: *Absence of Falls Description: Document Ericka Minium Fall Risk and appropriate interventions in the flowsheet. Outcome: Progressing Towards Goal 
Note: Fall Risk Interventions: 
  
 
Mentation Interventions: Bed/chair exit alarm Medication Interventions: Bed/chair exit alarm Elimination Interventions: Bed/chair exit alarm History of Falls Interventions: Bed/chair exit alarm Problem: Patient Education: Go to Patient Education Activity Goal: Patient/Family Education Outcome: Progressing Towards Goal 
  
Problem: Pressure Injury - Risk of 
Goal: *Prevention of pressure injury Description: Document Jc Scale and appropriate interventions in the flowsheet. Outcome: Progressing Towards Goal 
Note: Pressure Injury Interventions: 
Sensory Interventions: Assess changes in LOC, Discuss PT/OT consult with provider Moisture Interventions: Absorbent underpads Activity Interventions: PT/OT evaluation Mobility Interventions: Float heels, HOB 30 degrees or less Nutrition Interventions: Document food/fluid/supplement intake Problem: Patient Education: Go to Patient Education Activity Goal: Patient/Family Education Outcome: Progressing Towards Goal 
  
Problem: Patient Education: Go to Patient Education Activity Goal: Patient/Family Education Outcome: Progressing Towards Goal 
  
Problem: Hip Fracture:Day of Admission Pre-op Goal: Off Pathway (Use only if patient is Off Pathway) Outcome: Progressing Towards Goal 
Goal: Activity/Safety Outcome: Progressing Towards Goal 
Goal: Consults, if ordered Outcome: Progressing Towards Goal 
Goal: Diagnostic Test/Procedures Outcome: Progressing Towards Goal 
Goal: Nutrition/Diet Outcome: Progressing Towards Goal 
Goal: Medications Outcome: Progressing Towards Goal 
Goal: Respiratory Outcome: Progressing Towards Goal 
Goal: Treatments/Interventions/Procedures Outcome: Progressing Towards Goal 
 Goal: Psychosocial 
Outcome: Progressing Towards Goal 
Goal: *Labs/Tests Within Defined Limits in Preparation for Surgery Outcome: Progressing Towards Goal 
Goal: *Optimal pain control at patient's stated goal 
Outcome: Progressing Towards Goal 
Goal: *Hemodynamically stable Outcome: Progressing Towards Goal 
  
Problem: Diabetes Self-Management Goal: *Disease process and treatment process Description: Define diabetes and identify own type of diabetes; list 3 options for treating diabetes. Outcome: Progressing Towards Goal 
Goal: *Incorporating nutritional management into lifestyle Description: Describe effect of type, amount and timing of food on blood glucose; list 3 methods for planning meals. Outcome: Progressing Towards Goal 
Goal: *Incorporating physical activity into lifestyle Description: State effect of exercise on blood glucose levels. Outcome: Progressing Towards Goal 
Goal: *Developing strategies to promote health/change behavior Description: Define the ABC's of diabetes; identify appropriate screenings, schedule and personal plan for screenings. Outcome: Progressing Towards Goal 
Goal: *Using medications safely Description: State effect of diabetes medications on diabetes; name diabetes medication taking, action and side effects. Outcome: Progressing Towards Goal 
Goal: *Monitoring blood glucose, interpreting and using results Description: Identify recommended blood glucose targets  and personal targets. Outcome: Progressing Towards Goal 
Goal: *Prevention, detection, treatment of acute complications Description: List symptoms of hyper- and hypoglycemia; describe how to treat low blood sugar and actions for lowering  high blood glucose level. Outcome: Progressing Towards Goal 
Goal: *Prevention, detection and treatment of chronic complications Description: Define the natural course of diabetes and describe the relationship of blood glucose levels to long term complications of diabetes. Outcome: Progressing Towards Goal 
Goal: *Developing strategies to address psychosocial issues Description: Describe feelings about living with diabetes; identify support needed and support network Outcome: Progressing Towards Goal 
Goal: *Insulin pump training Outcome: Progressing Towards Goal 
Goal: *Sick day guidelines Outcome: Progressing Towards Goal 
Goal: *Patient Specific Goal (EDIT GOAL, INSERT TEXT) Outcome: Progressing Towards Goal 
  
Problem: Patient Education: Go to Patient Education Activity Goal: Patient/Family Education Outcome: Progressing Towards Goal

## 2020-05-05 NOTE — PROGRESS NOTES
[address] [place] [address]  [phone] Cardiology Progress Note 5/5/2020 10:30 AM 
 
Admit Date: 5/2/2020 Admit Diagnosis:  
Closed right hip fracture ([address] Dannymary jo Teresa Subjective: Ramonita Riggs has advanced dementia. Complains of generalized pain, especially in hip. Now in Afib. Initial Covid-19 test negative; remains on isolation. Poor historian. H/H 7.9/24.5 WBC 11.5 Visit Vitals /76 (BP 1 Location: Left arm, BP Patient Position: At rest) Pulse (!) 106 Temp 98.1 °F (36.7 °C) Resp 20 Ht 5' 3\" (1.6 m) Wt 61.1 kg (134 lb 11.2 oz) SpO2 100% BMI 23.86 kg/m² Current Facility-Administered Medications Medication Dose Route Frequency  potassium chloride SR (KLOR-CON 10) tablet 20 mEq  20 mEq Oral NOW  ampicillin (OMNIPEN) 500 mg in 0.9% sodium chloride (MBP/ADV) 50 mL  500 mg IntraVENous Q6H  
 clopidogreL (PLAVIX) tablet 75 mg  75 mg Oral DAILY  0.9% sodium chloride infusion  125 mL/hr IntraVENous CONTINUOUS  
 sodium chloride (NS) flush 5-40 mL  5-40 mL IntraVENous Q8H  
 sodium chloride (NS) flush 5-40 mL  5-40 mL IntraVENous PRN  
 acetaminophen (TYLENOL) tablet 650 mg  650 mg Oral Q6H  
 oxyCODONE IR (ROXICODONE) tablet 2.5 mg  2.5 mg Oral Q4H PRN  
 oxyCODONE IR (ROXICODONE) tablet 5 mg  5 mg Oral Q4H PRN  
 naloxone (NARCAN) injection 0.4 mg  0.4 mg IntraVENous PRN  
 calcium-vitamin D (OS-JERE) 500 mg-200 unit tablet  1 Tab Oral TID WITH MEALS  polyethylene glycol (MIRALAX) packet 17 g  17 g Oral DAILY  [START ON 5/6/2020] bisacodyL (DULCOLAX) suppository 10 mg  10 mg Rectal DAILY PRN  
 aspirin delayed-release tablet 325 mg  325 mg Oral BID  ondansetron (ZOFRAN) injection 4 mg  4 mg IntraVENous Q4H PRN  
 sodium chloride (NS) flush 5-40 mL  5-40 mL IntraVENous Q8H  
 atorvastatin (LIPITOR) tablet 40 mg  40 mg Oral DAILY  donepeziL (ARICEPT) tablet 5 mg  5 mg Oral QHS  oxyCODONE IR (ROXICODONE) tablet 2.5 mg  2.5 mg Oral Q4H PRN  
 oxyCODONE IR (ROXICODONE) tablet 5 mg  5 mg Oral Q4H PRN  
 naloxone (NARCAN) injection 0.4 mg  0.4 mg IntraVENous PRN  
 senna-docusate (PERICOLACE) 8.6-50 mg per tablet 2 Tab  2 Tab Oral BID  labetaloL (NORMODYNE;TRANDATE) injection 20 mg  20 mg IntraVENous Q6H PRN  
 ascorbic acid (vitamin C) (VITAMIN C) tablet 500 mg  500 mg Oral BID  zinc sulfate (ZINCATE) 220 (50) mg capsule 1 Cap  1 Cap Oral DAILY  metoprolol tartrate (LOPRESSOR) tablet 75 mg  75 mg Oral BID  amLODIPine (NORVASC) tablet 2.5 mg  2.5 mg Oral DAILY  glucose chewable tablet 16 g  4 Tab Oral PRN  
 dextrose (D50W) injection syrg 12.5-25 g  25-50 mL IntraVENous PRN  
 glucagon (GLUCAGEN) injection 1 mg  1 mg IntraMUSCular PRN  
 insulin lispro (HUMALOG) injection   SubCUTAneous AC&HS  acetaminophen (TYLENOL) tablet 650 mg  650 mg Oral Q6H PRN Or  
 acetaminophen (TYLENOL) suppository 650 mg  650 mg Rectal Q6H PRN Objective:  
  
Physical Exam: 
General Appearance: thin, frail, elderly, pleasant AA female in NAD Chest:  Clear, diminished posteriorly Cardiovascular:  irregular, no murmur. Abdomen:   Soft, non-tender, bowel sounds are active. Extremities: no edema, SCD's on, DP/PT palpable Skin:  Warm and dry. dressing on right hip Data Review:  
Recent Labs 05/05/20 
(02) [phone] 05/03/20 
1131 05/02/20 
2223 WBC 11.5* 9.7 12.0* HGB 7.9* 8.7* 9.8* HCT 24.5* 26.4* 29.4* * 386 434* Recent Labs 05/05/20 
7769 05/03/20 
1131 05/03/20 
0227 05/02/20 
2223  139  --  140  
K 3.4* 3.8  --  3.5 * 108  --  107 CO2 18* 23  --  24  
* 241*  --  92 BUN 16 15  --  13  
CREA 0.63 0.77  --  0.80 CA 8.7 8.6  --  9.2 MG  --   --   --  1.6 ALB  --  3.0*  --  3.4* TBILI  --  0.7  --  0.8 SGOT  --  17  --  17 ALT  --  28  --  36 INR  --   --  1.1 1.0 Recent Labs 05/02/20 
1007 TROIQ <0.05  
 Intake/Output Summary (Last 24 hours) at 5/5/2020 1030 Last data filed at 5/5/2020 6621 Gross per 24 hour Intake 600 ml Output 725 ml Net -125 ml Telemetry: Afib rate controlled 90's ECG: SA with PACs, 1st deg Echocardiogram: 5/3/2020 · Normal cavity size and systolic function (ejection fraction normal). Mild to moderate hypertrophy. Estimated left ventricular ejection fraction is 65 - 70%. · Mitral valve non-specific thickening (? Rheumatic changes). Mild to moderate mitral valve regurgitation is present. · Small pericardial effusion. · Mildly dilated left atrium. Assessment:  
 
Principal Problem: 
  Closed right hip fracture ([facility] (5/3/2020) Active Problems: 
  Coronary artery disease involving native coronary artery (5/4/2020) Overview: 5/2019 at [facility] cardiac cath with  to mid-distal  
    LAD, med management Diabetes mellitus type 2, controlled ([facility] (5/4/2020) Hypertension (5/4/2020) Dementia ([facility] () Hyperlipemia () Plan:  
CAD with known  of LAD with collaterals: 
Stable, cath at [facility] 5/2019 for [place], +stress test which led to cardiac cath. She was not deemed a candidate for PCI or CABG. Patient has continued PTA on Plavix, metoprolol and lipitor. Recommend continue on BB and statin, and restart Plavix and ASA post surgery Currently COVID19 test negative, remains on isolation. Pre-op CV risk assessment: 
Cardiac Clearance for Surgery - RCRI 2.4% & 3.6% EKG NSR with 1st deg block EF 84-11%, mild diastolic dysfunction Hx of HTN, DM, CAD 
RCRI 2.4% - Rate of cardiac death, nonfatal MI, nonfatal cardiac arrest  with no risk factors according to revised cardiac risk intex RCRI 3.6% - rate of MI, pulm edema, v fib, primary cardiac arrest and complete HB according to revised cardiac risk index 
 The patient is moderate risk, but family understands and does not want her to be bedridden. OK to proceed with surgery from cardiac standpoint.  S/P intramedullary fixation of right hip fracture: 
-managed per orthopaedic medicine Advanced Dementia: 
-managed per internal medicine DM II: 
-managed per internal medicine HTN: 
Continue BB and norvasc HLD: 
Continue statin New onset PAF: rate controlled 90's ChadVasc= 2 
-do not recommend DOAC given patient high fall risk and advanced dementia. -replace K, monitor e- 
-continue ASA Cardiology will sign-off, please call with questions. FAZAL Mathur 
MSN,RN,AG-ACNP-BC Patient seen and examined by me with nurse practitioner. I personally performed all components of the history, physical, and medical decision making and agree with the assessment and plan as noted.  
 
Pop Godfrey MD

## 2020-05-05 NOTE — OP NOTES
[address] 
OPERATIVE REPORT Name:  David Rogers 
MR#:  [id number] :  1950 ACCOUNT #:  [de-identified] DATE OF SERVICE:  2020 PREOPERATIVE DIAGNOSIS:  Intertrochanteric fracture, right hip. POSTOPERATIVE DIAGNOSIS:  Intertrochanteric fracture, right hip. PROCEDURE PERFORMED:  Intramedullary fixation right hip fracture. SURGEON:  Merilee Lesches, MD 
 
ASSISTANT:  None. ANESTHESIA:  General. 
 
COMPLICATIONS:  None. SPECIMENS REMOVED:  None IMPLANTS:  Synthes trochanteric nail and appropriate lag screw and locking screw. ESTIMATED BLOOD LOSS:  About 50 mL. DRAINS:  None. SUMMARY:  The patient was brought into the operating room and general anesthetic administered while she was in her bed. She was then transferred over to the fracture table and positioned in the usual manner for a right hip fracture, with the left leg in a stirrup and the shower curtain used for draping. Free flap fluoroscopic imaging showed excellent reduction of the fracture. The right hip was then prepped and draped in the usual manner. After time-out, a longitudinal incision was made on the lateral proximal aspect of the right hip, extending superiorly from the tip of the greater trochanter. Fascia was divided. The greater trochanteric tip was located and a guidewire positioned there. Its position was confirmed fluoroscopically. It was then driven down the femur. Its position was adjusted as needed, until the position was excellent in the AP and lateral planes. The trochanteric reamer was then used to ream the greater trochanter. The nail was then inserted. The radiolucent guide was then used to place the lag screw in the usual manner, after placing the guidewire and confirming excellent position in the AP and lateral planes. Some compression was placed on the lag screw, which was 85-mm in length. Now the superior locking screw was engaged. Next, the locking screw was placed distally. A 34 screw was measured and placed through the guide. The external jig was removed. Final AP and lateral fluoroscopic films showed excellent position of the fracture and internal fixation. The wounds were thoroughly irrigated. Fascia was closed with 0 Vicryl. Subcutaneous tissue was closed with 2-0 Vicryl and the skin was closed with staples. Dry sterile dressing was placed. The patient tolerated the procedure well, was taken back to the PACU in stable condition. Aldo Wells MD 
 
 
[id number] 
D:  05/04/2020 20:43 T:  05/04/2020 23:16 
JOB #:  [id number]

## 2020-05-05 NOTE — PROGRESS NOTES
Problem: Dysphagia (Adult) Goal: *Acute Goals and Plan of Care (Insert Text) Description: 5/5/2020 Speech path goals 1. Patient will participate with reeval of swallowing. 2. Patient will tolerate sips and chips with no overt s/s of aspiration. Outcome: Not Progressing Towards Goal 
 SPEECH LANGUAGE PATHOLOGY BEDSIDE SWALLOW EVALUATION Patient: Enrique Zhang (78 y.o. female) Date: 5/5/2020 Primary Diagnosis: Closed right hip fracture ([address] [S72.001A] Procedure(s) (LRB): 
RIGHT FEMUR INSERTION INTRA MEDULLARY NAIL (Right) 1 Day Post-Op Precautions:     
 
ASSESSMENT : 
Based on the objective data described below, the patient accepted thins with functional swallow. After a few sips, she started to hold po orally both liquids and purees. She spit out the water and purees after coaxing. She is very confused and will hopefully start to take po. We will reeval her tomorrow. Patient will benefit from skilled intervention to address the above impairments. Patients rehabilitation potential is considered to be Fair PLAN : 
Recommendations and Planned Interventions: 
Sips and chips for comfort. Meds crushed in applesauce if meds cannot be nonoral  
Frequency/Duration: Patient will be followed by speech-language pathology 2 times a week to address goals. Discharge Recommendations: None SUBJECTIVE:  
Patient stated You look beatiful. \" OBJECTIVE:  
 
Past Medical History:  
Diagnosis Date CAD (coronary artery disease) Coronary artery disease involving native coronary artery 5/4/2020 5/2019 at [facility] cardiac cath with  to mid-distal LAD, med management Dementia ([facility] Diabetes ([facility] [address] [facility] Diabetes mellitus type 2, controlled ([address] 5/4/2020 Hyperlipemia Hypertension Hypertension 5/4/2020 History reviewed. No pertinent surgical history. Prior Level of Function/Home Situation: in a [facility] home Diet prior to admission: mech soft and thins Current Diet:  NPO Cognitive and Communication Status: 
Neurologic State: Zayda Robbins Orientation Level: Oriented to person, Disoriented to time, Disoriented to place, Disoriented to situation Cognition: Decreased attention/concentration, Impaired decision making, Decreased command following Perception: Appears intact Perseveration: No perseveration noted Oral Assessment: 
Oral Assessment Labial: No impairment Dentition: Edentulous Lingual: Other (comment) Velum: Other (comment) Mandible: No impairment P.O. Trials: 
Patient Position: upright in bed Vocal quality prior to P.O.: No impairment Consistency Presented: Thin liquid;Puree How Presented: Self-fed/presented;Straw;SLP-fed/presented;Spoon Bolus Acceptance: No impairment Bolus Formation/Control: Impaired Propulsion: Delayed (# of seconds)(oral holding of purees and liquids) Initiation of Swallow: Delayed (# of seconds) Laryngeal Elevation: Functional 
Aspiration Signs/Symptoms: None Oral Phase Severity: Moderate-severe Pharyngeal Phase Severity : Mild NOMS:  
The NOMS functional outcome measure was used to quantify this patient's level of swallowing impairment. Based on the NOMS, the patient was determined to be at level 2 for swallow function [facility]: 
Level 1 (CN): NPO Level 2 (CM): NPO but takes consistency in therapy Level 3 (CL): Takes less than 50% of nutrition p.o. and continues with nonoral feedings; and/or safe with mod cues; and/or max diet restriction Level 4 (CK): Safe swallow but needs mod cues; and/or mod diet restriction; and/or still requires some nonoral feeding/supplements Level 5 (CJ): Safe swallow with min diet restriction; and/or needs min cues Level 6 (CI): Independent with p.o.; rare cues; usually self cues; may need to avoid some foods or needs extra time Level 7 (CH): Independent for all p.o. ALEC. (2003). National Outcomes Measurement System (NOMS): Adult Speech-Language Pathology User's Guide. Pain: 
Pain Scale 1: Adult Nonverbal Pain Scale Pain Intensity 1: 7 Pain Location 1: Hip After treatment:  
Patient left in no apparent distress in bed and Nursing notified COMMUNICATION/EDUCATION:  
 
 
The patient's plan of care including recommendations, planned interventions, and recommended diet changes were discussed with: Registered nurse. Patient is unable to participate in goal setting and plan of care. Thank you for this referral. 
SELAM Graves Time Calculation: 10 mins

## 2020-05-05 NOTE — PROGRESS NOTES
Pharmacy Medication Reconciliation Allergy Update: Patient has no known allergies. Recommendations/Findings: The following amendments were made to the patient's active medication list on file at [facility]:  
1) Additions: None 2) Deletions:  
     Albuterol 3) Changes:  
     - Guaifenesin: Twice daily as needed - Lantus: 15 units daily - Humalo units 3 times daily with meals. - Metoprolol: changed to the long acting daily Pertinent Findings: None 
 
-Clarified PTA med list with Medication list from [facility]. PTA medication list was corrected to the following:  
 
Prior to Admission Medications Prescriptions Last Dose Informant Taking?  
ascorbic acid, vitamin C, (Vitamin C) 500 mg tablet 2020 at Unknown time  Yes Sig: Take 500 mg by mouth daily. atorvastatin (LIPITOR) 40 mg tablet 2020 at Unknown time  Yes Sig: Take 40 mg by mouth daily. buPROPion SR (WELLBUTRIN SR) 150 mg SR tablet 2020 at Unknown time  Yes Sig: Take 150 mg by mouth two (2) times a day. clopidogreL (PLAVIX) 75 mg tab 2020 at Unknown time  Yes Sig: Take 75 mg by mouth daily. donepeziL (ARICEPT) 5 mg tablet 2020 at Unknown time  Yes Sig: Take 5 mg by mouth daily. guaiFENesin ER (Mucinex) 600 mg ER tablet 2020 at Unknown time  Yes Sig: Take 600 mg by mouth two (2) times daily as needed. insulin glargine (LANTUS,BASAGLAR) 100 unit/mL (3 mL) inpn 2020 at Unknown time  Yes Sig: 15 Units by SubCUTAneous route daily. insulin lispro (HumaLOG U-100 Insulin) 100 unit/mL injection 2020 at Unknown time  Yes Si Units by SubCUTAneous route three (3) times daily (with meals). If BG greater or equal to 200  
metFORMIN (GLUCOPHAGE) 500 mg tablet 2020 at Unknown time  Yes Sig: Take 500 mg by mouth two (2) times daily (with meals). metoprolol succinate (TOPROL-XL) 50 mg XL tablet   Yes Sig: Take 50 mg by mouth daily. pantoprazole (PROTONIX) 40 mg tablet 5/2/2020 at Unknown time  Yes Sig: Take 40 mg by mouth daily. Facility-Administered Medications: None Thank you, Ketty Mazariegos, PHARMD

## 2020-05-05 NOTE — PROGRESS NOTES
This patient need a diet order. MD Hospitalist was notified by page. Success A new connect request was successfully created for: Miguel Bailey : 1950 MRN: [id number] ConnectID: [id number] REASON: Other non-Emergent ACUITY: 30 Minutes SUBMITTED: 2020 22:38 EDT CLOSE

## 2020-05-05 NOTE — PROGRESS NOTES
Hospitalist Progress Note NAME: Elana North :  1950 MRN:  [id number] Interim Hospital Summary: 78 y.o. female whom presented on 2020 with dementia, DM, HTN, hyperlipidemia, CAD, who presents from a nursing home with right hip pain. She doesn't provide any history. She had multiple falls yesterday and was noted to have a fever and a cough. X-rays were taken there and reportedly showed a right hip fracture Assessment / Plan: 
Enterococcus UTI 
-Urine cx growing Enterococcus and now on Ampicillin until  Right femoral intertrochanteric fracture s/p ORIF 
-post op care per ortho. 
-On asa for dvt px and now off Plavix per cards recommendations 
-Hb is down to 7.9 from 8.7 COVID 19 rule  out  
-first sars co v 2 test negative and second one was ordered. CxR to rule out Pneumonia Persistent A-fib Hypertension CAD Hyperlipidemia 
-continue with metoprolol  
-cont added norvasc Type 2 DM  
- A1C 9.0 
-Cont lispro SSI Hx of CVA 
-Cont asa in place of plavix Dementia  
- baseline; knows herself and place, able to tell daughter's name Lactic acidosis; resolved peak 4.7 -> 1.7 Estimated body mass index is 23.86 kg/m² as calculated from the following: 
  Height as of this encounter: 5' 3\" (1.6 m). Weight as of this encounter: 61.1 kg (134 lb 11.2 oz). Code status: DNR Prophylaxis: SCD's/ Jay Rogers Recommended Disposition: LTC vs home with home health Subjective: Chief Complaint / Reason for Physician Visit No fever. Poor historian. Confused No abdominal pain Objective: VITALS:  
Last 24hrs VS reviewed since prior progress note. Most recent are: 
Patient Vitals for the past 24 hrs: 
 Temp Pulse Resp BP SpO2  
20 1101 98.4 °F (36.9 °C) 79 18 124/79 99 % 20 0808 98.1 °F (36.7 °C) (!) 106  131/76 100 % 20 0434  93   100 % 20 0412 98.5 °F (36.9 °C) 91 20 147/66 100 % 05/05/20 0312 98.2 °F (36.8 °C) 98 20 161/76 100 % 05/05/20 0215 97.9 °F (36.6 °C) (!) 103 22 151/73 100 % 05/05/20 0115 98.2 °F (36.8 °C) 97 19 146/60 100 % 05/05/20 0019 98 °F (36.7 °C) 93 18 164/78 100 % 05/04/20 2359 97.7 °F (36.5 °C) 90 17 155/80 100 % 05/04/20 2259 98.2 °F (36.8 °C) 94 20 157/69 100 % 05/04/20 2230 97.7 °F (36.5 °C) 89 20 145/90 100 % 05/04/20 2213 97.9 °F (36.6 °C) 99 22 160/63 100 % 05/04/20 2203 98.2 °F (36.8 °C) 91 22 155/81 100 % 05/04/20 2145  (!) 102 18 158/77 100 % 05/04/20 2140 98.3 °F (36.8 °C) 96 20 160/86 100 % 05/04/20 2131  (!) 101 22 155/90 100 % 05/04/20 2128  (!) 101 21 (!) 140/97 100 % 05/04/20 2120  (!) 103 24 (!) 113/94 100 % 05/04/20 2119  (!) 103 24 135/81 100 % 05/04/20 2110  (!) 102 22 (!) 153/91 100 % 05/04/20 2106 97.7 °F (36.5 °C) 98 22 (!) 169/92 98 % 05/04/20 2105  (!) 102 18 (!) 169/92 100 % 05/04/20 2100  100 20 183/83 100 % 05/04/20 2057 97.7 °F (36.5 °C) 100 20 189/83 100 % Intake/Output Summary (Last 24 hours) at 5/5/2020 1511 Last data filed at 5/5/2020 2207 Gross per 24 hour Intake 600 ml Output 525 ml Net 75 ml PHYSICAL EXAM: 
General: Alert and awake EENT:  EOMI. Anicteric sclerae. MMM Resp:  CTA bilaterally, no wheezing or rales. No accessory muscle use CV:             irregular  rhythm,  No edema GI:  Soft, Non distended, Non tender. +Bowel sounds Neurologic:  Alert and awake Psych:   confused Skin:  No rashes. No jaundice Reviewed most current lab test results and cultures  YES Reviewed most current radiology test results   YES Review and summation of old records today    NO Reviewed patient's current orders and MAR    YES 
PMH/SH reviewed - no change compared to H&P 
________________________________________________________________________ Care Plan discussed with: 
  Comments Patient y Family RN y   
Care Manager Consultant Multidiciplinary team rounds were held today with , nursing, pharmacist and clinical coordinator. Patient's plan of care was discussed; medications were reviewed and discharge planning was addressed. ________________________________________________________________________ Mechelle Almanza MD  
 
Procedures: see electronic medical records for all procedures/Xrays and details which were not copied into this note but were reviewed prior to creation of Plan. LABS: 
I reviewed today's most current labs and imaging studies. Pertinent labs include: 
Recent Labs 05/05/20 
(02) [id number] 9860 05/03/20 
1131 05/02/20 
2223 WBC 11.5* 9.7 12.0* HGB 7.9* 8.7* 9.8* HCT 24.5* 26.4* 29.4* * 386 434* Recent Labs 05/05/20 
9276 05/03/20 
1131 05/03/20 
0227 05/02/20 
2223  139  --  140  
K 3.4* 3.8  --  3.5 * 108  --  107 CO2 18* 23  --  24  
* 241*  --  92 BUN 16 15  --  13  
CREA 0.63 0.77  --  0.80 CA 8.7 8.6  --  9.2 MG  --   --   --  1.6 ALB  --  3.0*  --  3.4* TBILI  --  0.7  --  0.8 SGOT  --  17  --  17 ALT  --  28  --  36 INR  --   --  1.1 1.0 Signed: )Mechelle Almanza MD

## 2020-05-05 NOTE — PROGRESS NOTES
Ortho / Neurosurgery NP Note POD# 1  s/p RIGHT FEMUR INSERTION INTRA MEDULLARY NAIL Patient on isolation for COVID rule out. RN at bedside. Pt resting in bed, shifting frequently. Confused at baseline, not answering questions. Per nursing, attempts to get oob occasionally and has been pleasantly confused. Tolerating regular diet. VSS Afebrile. Room air. Remote tele - NSR on monitor in 80s Visit Vitals /79 (BP 1 Location: Left arm, BP Patient Position: At rest) Pulse 79 Temp 98.4 °F (36.9 °C) Resp 18 Ht 5' 3\" (1.6 m) Wt 61.1 kg (134 lb 11.2 oz) SpO2 99% BMI 23.86 kg/m² Voiding status: gil d/c this morning - due to void Output (mL) Last Bowel Movement Date: (unable to assess) (05/04/20 0900) Labs Lab Results Component Value Date/Time HGB 7.9 (L) 05/05/2020 04:33 AM  
  
Lab Results Component Value Date/Time INR 1.1 05/03/2020 02:27 AM  
  
Lab Results Component Value Date/Time Sodium 141 05/05/2020 04:33 AM  
 Potassium 3.4 (L) 05/05/2020 04:33 AM  
 Chloride 111 (H) 05/05/2020 04:33 AM  
 CO2 18 (L) 05/05/2020 04:33 AM  
 Glucose 217 (H) 05/05/2020 04:33 AM  
 BUN 16 05/05/2020 04:33 AM  
 Creatinine 0.63 05/05/2020 04:33 AM  
 Calcium 8.7 05/05/2020 04:33 AM  
 
Recent Glucose Results:  
Lab Results Component Value Date/Time  (H) 05/05/2020 04:33 AM  
 GLUCPOC 227 (H) 05/05/2020 11:04 AM  
 GLUCPOC 235 (H) 05/05/2020 08:11 AM  
 GLUCPOC 230 (H) 05/04/2020 09:01 PM  
 
 
 
 
Body mass index is 23.86 kg/m². : A BMI > 30 is classified as obesity and > 40 is classified as morbid obesity. Congested cough Opsite dressings c.d.i Cryotherapy in place over incision Calves soft and supple; No pain with passive stretch Assessment limited due to cognition - moves all extremities SCDs for mechanical DVT proph while in bed PLAN: 
1) PT BID, OT - WBAT 2) Aspirin 325 mg PO BID for DVT Prophylaxis - Clarified anticoagulation plans with Cardiology NP with recommendations to d/c Plavix, and continue  BID. Dr Jazmnie Griffith updated on anticoag plans as well. 3) GI Prophylaxis - Pepcid 4) Dementia - fall prevention/safety, bed alarm. Per nursing, required roll belt yesterday but has been easily redirected today. 5) Readniess for discharge: 
   [x] Vital Signs stable  
 [x] Hgb stable  
 [] + Voiding - due to void 
 [x] Wound intact, drainage minimal  
 [x] Tolerating PO intake   
 [] Cleared by PT (OT if applicable) [] Stair training completed (if applicable) [] Independent / Contact Guard Assist (household distance) [] Bed mobility [] Car transfers  
  [] ADLs [x] Adequate pain control on oral medication alone Patient from SNF, plans to return pending second COVID testing.   
 
Hero Padilla NP

## 2020-05-05 NOTE — PROGRESS NOTES
Problem: Mobility Impaired (Adult and Pediatric) Goal: *Acute Goals and Plan of Care (Insert Text) Description: FUNCTIONAL STATUS PRIOR TO ADMISSION: Patient is a poor historian. Hx obtained from chart review only. Patient was modified independent using a rolling walker for functional mobility. She was dependent for self care. The patient was oriented to self and could recognize her daughter. HOME SUPPORT PRIOR TO ADMISSION: The patient lived in [address] [place] [address] at [facility]. Physical Therapy Goals Initiated 5/5/2020 1. Patient will move from supine to sit and sit to supine  in bed with moderate assistance  within 7 day(s). 2.  Patient will transfer from bed to chair and chair to bed with moderate assistance  using the least restrictive device within 7 day(s). 3.  Patient will perform sit to stand with moderate assistance  within 7 day(s). 4.  Patient will ambulate with moderate assistance  for 25 feet with the least restrictive device within 7 day(s). 5.  Patient will follow >50% of 1 step commands for functional tasks within 7 day(s). Outcome: Progressing Towards Goal 
 PHYSICAL THERAPY EVALUATION Patient: Wicho Zarate (78 y.o. female) Date: 5/5/2020 Primary Diagnosis: Closed right hip fracture ([facility] [address] [S72.001A] Procedure(s) (LRB): 
RIGHT FEMUR INSERTION INTRA MEDULLARY NAIL (Right) 1 Day Post-Op Precautions:     
 
 
ASSESSMENT Based on the objective data described below, the patient presents with anticipated post-op pain, impaired balance, impulsivity, and poor command following after admission for a right hip fx and IM nail. This patient has baseline dementia and lives in a memory care unit. Evaluation was grossly limited by command following and the patient's to communicate. She followed <40% of simple commands despite maximum multimodal cues. Of note, she was also non-compliant with a face mask during session d/t discomfort and lack of understanding of its necessity. She was received seated on edge of bed and able to maintain unsupported sitting balance until she had a significant LOB when attempted to don a sock and required minimal assist for balance. Little to no initiation of activity such as donning her sock or transfers until physically started with/for her. She required mod-maximum assist x2 to initiate sit to stand but was then able to maintain standing with moderate assist x1. The standing trial was brief ~45 seconds d/t patient refusal to attempt side stepping and decreased standing tolerance with the onset of pain in WB. Concern for slow progress d/t cognition but recommend return to a familiar setting with therapy to attempt to restore functional mobility as her pain improves. The patient lives at [address] [place] [address] at baseline but, per chart, the patient's daughter does not want her to return. Would recommend a SNF vs LTC facility d/t required 24 hour supervision and physical assist required to mobilize the patient. Current Level of Function Impacting Discharge (mobility/balance): mod-max x2 for sit to stand Patient will benefit from skilled therapy intervention to address the above noted impairments. PLAN : 
Recommendations and Planned Interventions: bed mobility training, transfer training, gait training, and therapeutic exercises Frequency/Duration: Patient will be followed by physical therapy:  3 times a week to address goals. Recommendation for discharge: (in order for the patient to meet his/her long term goals) Therapy up to 5 days/week in SNF setting IF patient discharges home will need the following DME: patient owns DME required for discharge SUBJECTIVE:  
Patient stated I can't stand.  OBJECTIVE DATA SUMMARY:  
HISTORY:   
Past Medical History:  
Diagnosis Date CAD (coronary artery disease) Coronary artery disease involving native coronary artery 5/4/2020 5/2019 at [facility] cardiac cath with  to mid-distal LAD, med management Dementia ([facility] [address] Diabetes ([facility] [address] Diabetes mellitus type 2, controlled ([facility] [address] 5/4/2020 Hyperlipemia Hypertension Hypertension 5/4/2020 History reviewed. No pertinent surgical history. Personal factors and/or comorbidities impacting plan of care:  
 
Home Situation Home Environment: Long term care(Resident at [facility]) One/Two Story Residence: One story EXAMINATION/PRESENTATION/DECISION MAKING:  
Critical Behavior: 
Neurologic State: Confused Orientation Level: Oriented to person, Disoriented to time, Disoriented to place, Disoriented to situation Cognition: Decreased attention/concentration, Impaired decision making, Decreased command following Hearing: 
  
Skin:   
Edema:  
Range Of Motion: 
AROM: Generally decreased, functional(only as observed) Strength:   
Strength: Generally decreased, functional(only as observed) Tone & Sensation:  
  
  
  
  
  
  
  
  
  
   
Coordination: 
  
Vision:  
  
Functional Mobility: 
Bed Mobility: 
  
  
Sit to Supine: Moderate assistance;Assist x2 Transfers: 
Sit to Stand: Moderate assistance;Maximum assistance;Assist x2; Additional time Stand to Sit: Moderate assistance;Assist x2 Balance:  
Sitting: Impaired Sitting - Static: Good (unsupported) Sitting - Dynamic: Fair (occasional) Standing: Impaired Standing - Static: Poor Standing - Dynamic : Poor Physical Therapy Evaluation Charge Determination History Examination Presentation Decision-Making MEDIUM  Complexity : 1-2 comorbidities / personal factors will impact the outcome/ POC  MEDIUM Complexity : 3 Standardized tests and measures addressing body structure, function, activity limitation and / or participation in recreation  MEDIUM Complexity : Evolving with changing characteristics  MEDIUM Complexity : FOTO score of 26-74 Based on the above components, the patient evaluation is determined to be of the following complexity level: MEDIUM Activity Tolerance:  
Poor Please refer to the flowsheet for vital signs taken during this treatment. After treatment patient left in no apparent distress:  
Supine in bed and Bed / chair alarm activated COMMUNICATION/EDUCATION:  
The patients plan of care was discussed with: Registered nurse. Fall prevention education was provided and the patient/caregiver indicated understanding., Patient/family have participated as able in goal setting and plan of care. , and Patient/family agree to work toward stated goals and plan of care. Thank you for this referral. 
Hamzah Benoit, PT, DPT Time Calculation: 29 mins

## 2020-05-05 NOTE — PERIOP NOTES
2057 Handoff Report from Operating Room to PACU Report received from KARYN Martino RN and Merry Schlatter CRNA regarding Megha Cleary. Surgeon(s): 
Melo Gonzalez MD  And Procedure(s) (LRB): 
RIGHT FEMUR INSERTION INTRA MEDULLARY NAIL (Right)  confirmed  
with allergies and dressings discussed. Anesthesia type, drugs, patient history, complications, estimated blood loss, vital signs, intake and output, and last pain medication, lines and temperature were reviewed. 2145 TRANSFER - OUT REPORT: 
 
Verbal report given to Sayed Rn(name) on Megha Cleary  being transferred to Ortho(unit) for routine post - op Report consisted of patients Situation, Background, Assessment and  
Recommendations(SBAR). Information from the following report(s) SBAR, Kardex, OR Summary, Procedure Summary, Intake/Output and MAR was reviewed with the receiving nurse. Opportunity for questions and clarification was provided. Patient transported with: 
 Monitor O2 @ 2 liters Registered Nurse x 2 Patient chart

## 2020-05-06 NOTE — PROGRESS NOTES
Hospitalist Progress Note NAME: Charissa Hartman :  1950 MRN:  [id number] Interim Hospital Summary: 78 y.o. female whom presented on 2020 with dementia, DM, HTN, hyperlipidemia, CAD, who presents from a nursing home with right hip pain. She doesn't provide any history. She had multiple falls yesterday and was noted to have a fever and a cough. X-rays were taken there and reportedly showed a right hip fracture Assessment / Plan: 
Acute metabolic encephalopathy likely related to underlying dementia with delirium 
-Start measures for prevention of delirium. Open blinds in a.m. Reorient. 
-Start as needed Haldol for agitation. Consider restraints if she gets agitated -Minimize sedatives. Adequate pain control per Ortho. Enterococcus UTI 
-Urine cx growing Enterococcus and now on Ampicillin until  Right femoral intertrochanteric fracture s/p ORIF 
-post op care per ortho. 
-On asa for dvt px and now off Plavix per cards recommendations 
-Hb is 8.0 this morning COVID 19 rule  out  
-SARS-CoV-2 negative x2. Chest x-ray shows bilateral infiltrates. Patient is on 2 L nasal cannula. HTN uncontrolled 
-not able to tolerate po meds due to ams. On prn labetalol and hydralazine. 
-start scheduled metoprolol for blood pressure control until can take po  
 
Urinary retention 
-Check bladder scans every 6 hours and straight cath if more than 250 mL. Consider George catheter if urinary retention is persistent Persistent A-fib CAD Hyperlipidemia 
-Cont statin Type 2 DM uncontrolled - A1C 9.0 
-Cont lispro SSI. Add lantus 5 units as blood sugars are controlled Hx of CVA 
-Cont asa in place of plavix Dementia  
- baseline; knows herself and place, able to tell daughter's name Lactic acidosis; resolved peak 4.7 -> 1.7 Estimated body mass index is 25.35 kg/m² as calculated from the following: 
  Height as of this encounter: 5' 3\" (1.6 m). Weight as of this encounter: 64.9 kg (143 lb 1.3 oz). Code status: DNR Prophylaxis: SCD's/ Aaron Sun Recommended Disposition: LTC vs home with home health Subjective: Chief Complaint / Reason for Physician Visit Over nite events noted. Had issues with agitation last nite and required restraints Blood pressure was high last nite Required straight cath Objective: VITALS:  
Last 24hrs VS reviewed since prior progress note. Most recent are: 
Patient Vitals for the past 24 hrs: 
 Temp Pulse Resp BP SpO2  
05/06/20 1412 97.9 °F (36.6 °C) 76 20 (!) 185/94 100 % 05/06/20 1350 97.6 °F (36.4 °C) 68 20 170/73 100 % 05/06/20 1231  80  188/84 100 % 05/06/20 1129 97.8 °F (36.6 °C) 82 20 187/75 99 % 05/06/20 1025    (!) 178/96   
05/06/20 0822  89  (!) 196/91   
05/06/20 0742 98 °F (36.7 °C) 84 20 186/83 98 % 05/06/20 0638 98.2 °F (36.8 °C) 70 22 164/83 98 % 05/06/20 0625  77 20 177/63 100 % 05/06/20 0620  75 22 (!) 183/96 99 % 05/06/20 0615 98.2 °F (36.8 °C) 90 22 (!) 196/93 97 % 05/06/20 0313 97.9 °F (36.6 °C) 91 20 (!) 183/96 98 % 05/06/20 0300 97.9 °F (36.6 °C) 88 20  98 % 05/06/20 0112 98.2 °F (36.8 °C) 73 19 177/75 98 % 05/06/20 0031  69 16  100 % 05/06/20 0016  68 18 169/69 100 % 05/06/20 0010  79 18 186/66 98 % 05/06/20 0005  85 20 185/79 98 % 05/05/20 2357 97.9 °F (36.6 °C) 89 19 (!) 192/96 96 % 05/05/20 2012 97.5 °F (36.4 °C) 80 18 186/75 94 % 05/05/20 1537 97.6 °F (36.4 °C) 84 16 128/78 99 % Intake/Output Summary (Last 24 hours) at 5/6/2020 1519 Last data filed at 5/6/2020 1122 Gross per 24 hour Intake  Output 325 ml Net -325 ml PHYSICAL EXAM: 
General: Drowsy EENT:  EOMI. Anicteric sclerae. MMM Resp:  CTA bilaterally, no wheezing or rales. No accessory muscle use CV:             irregular  rhythm,  No edema GI:  Soft, Non distended, Non tender. +Bowel sounds Neurologic:  Drowsy now but wakes up to commands Psych:   confused Skin:  Incision is clean and dry Reviewed most current lab test results and cultures  YES Reviewed most current radiology test results   YES Review and summation of old records today    NO Reviewed patient's current orders and MAR    YES 
PMH/SH reviewed - no change compared to H&P 
________________________________________________________________________ Care Plan discussed with: 
  Comments Patient y Family RN y   
Care Manager Consultant Multidiciplinary team rounds were held today with , nursing, pharmacist and clinical coordinator. Patient's plan of care was discussed; medications were reviewed and discharge planning was addressed. ________________________________________________________________________ James Santos MD  
 
Procedures: see electronic medical records for all procedures/Xrays and details which were not copied into this note but were reviewed prior to creation of Plan. LABS: 
I reviewed today's most current labs and imaging studies. Pertinent labs include: 
Recent Labs 05/06/20 
2409 05/05/20 
8625 WBC  --  11.5* HGB 8.0* 7.9* HCT  --  24.5*  
PLT  --  424* Recent Labs 05/06/20 
8862 05/05/20 
9460  141  
K 3.6 3.4*  
* 111* CO2 18* 18* * 217* BUN 26* 16  
CREA 0.75 0.63 CA 9.1 8.7 MG 2.1  --   
 
 
Signed: )James Santos MD

## 2020-05-06 NOTE — PROGRESS NOTES
Bedside and Verbal shift change report given to [facility] Irina (oncoming nurse) by Ambar Joseph (offgoing nurse). Report included the following information SBAR, Kardex, Intake/Output, MAR and Recent Results.

## 2020-05-06 NOTE — PROGRESS NOTES
Speech pathology SLP, Lizzy Olmedo spoke with nursing this morning. Patient not appropriate for swallow re-assessment as patient sedated after receiving Haldol for agitation. She is in bilateral wrist restraints and roll belt. SLP will continue to follow. Note COVID ruled out this afternoon.   
Asael Guerra M.S. CCC-SLP

## 2020-05-06 NOTE — PROGRESS NOTES
Problem: Falls - Risk of 
Goal: *Absence of Falls Description: Document Jaya White Fall Risk and appropriate interventions in the flowsheet. Outcome: Progressing Towards Goal 
Note: Fall Risk Interventions: 
  
 
Mentation Interventions: Bed/chair exit alarm Medication Interventions: Bed/chair exit alarm Elimination Interventions: Bed/chair exit alarm History of Falls Interventions: Bed/chair exit alarm Problem: Patient Education: Go to Patient Education Activity Goal: Patient/Family Education Outcome: Progressing Towards Goal 
  
Problem: Pressure Injury - Risk of 
Goal: *Prevention of pressure injury Description: Document Cj Scale and appropriate interventions in the flowsheet. Outcome: Progressing Towards Goal 
Note: Pressure Injury Interventions: 
Sensory Interventions: Assess changes in LOC, Discuss PT/OT consult with provider Moisture Interventions: Absorbent underpads Activity Interventions: PT/OT evaluation Mobility Interventions: Float heels, HOB 30 degrees or less Nutrition Interventions: Document food/fluid/supplement intake Problem: Patient Education: Go to Patient Education Activity Goal: Patient/Family Education Outcome: Progressing Towards Goal 
  
Problem: Patient Education: Go to Patient Education Activity Goal: Patient/Family Education Outcome: Progressing Towards Goal 
  
Problem: Hip Fracture:Day of Admission Pre-op Goal: Off Pathway (Use only if patient is Off Pathway) Outcome: Progressing Towards Goal 
Goal: Activity/Safety Outcome: Progressing Towards Goal 
Goal: Consults, if ordered Outcome: Progressing Towards Goal 
Goal: Diagnostic Test/Procedures Outcome: Progressing Towards Goal 
Goal: Nutrition/Diet Outcome: Progressing Towards Goal 
Goal: Medications Outcome: Progressing Towards Goal 
Goal: Respiratory Outcome: Progressing Towards Goal 
Goal: Treatments/Interventions/Procedures Outcome: Progressing Towards Goal 
 Goal: Psychosocial 
Outcome: Progressing Towards Goal 
Goal: *Labs/Tests Within Defined Limits in Preparation for Surgery Outcome: Progressing Towards Goal 
Goal: *Optimal pain control at patient's stated goal 
Outcome: Progressing Towards Goal 
Goal: *Hemodynamically stable Outcome: Progressing Towards Goal 
  
Problem: Diabetes Self-Management Goal: *Disease process and treatment process Description: Define diabetes and identify own type of diabetes; list 3 options for treating diabetes. Outcome: Progressing Towards Goal 
Goal: *Incorporating nutritional management into lifestyle Description: Describe effect of type, amount and timing of food on blood glucose; list 3 methods for planning meals. Outcome: Progressing Towards Goal 
Goal: *Incorporating physical activity into lifestyle Description: State effect of exercise on blood glucose levels. Outcome: Progressing Towards Goal 
Goal: *Developing strategies to promote health/change behavior Description: Define the ABC's of diabetes; identify appropriate screenings, schedule and personal plan for screenings. Outcome: Progressing Towards Goal 
Goal: *Using medications safely Description: State effect of diabetes medications on diabetes; name diabetes medication taking, action and side effects. Outcome: Progressing Towards Goal 
Goal: *Monitoring blood glucose, interpreting and using results Description: Identify recommended blood glucose targets  and personal targets. Outcome: Progressing Towards Goal 
Goal: *Prevention, detection, treatment of acute complications Description: List symptoms of hyper- and hypoglycemia; describe how to treat low blood sugar and actions for lowering  high blood glucose level. Outcome: Progressing Towards Goal 
Goal: *Prevention, detection and treatment of chronic complications Description: Define the natural course of diabetes and describe the relationship of blood glucose levels to long term complications of diabetes. Outcome: Progressing Towards Goal 
Goal: *Developing strategies to address psychosocial issues Description: Describe feelings about living with diabetes; identify support needed and support network Outcome: Progressing Towards Goal 
Goal: *Insulin pump training Outcome: Progressing Towards Goal 
Goal: *Sick day guidelines Outcome: Progressing Towards Goal 
Goal: *Patient Specific Goal (EDIT GOAL, INSERT TEXT) Outcome: Progressing Towards Goal 
  
Problem: Patient Education: Go to Patient Education Activity Goal: Patient/Family Education Outcome: Progressing Towards Goal 
  
Problem: Patient Education: Go to Patient Education Activity Goal: Patient/Family Education Outcome: Progressing Towards Goal 
  
Problem: Patient Education: Go to Patient Education Activity Goal: Patient/Family Education Outcome: Progressing Towards Goal 
  
Problem: Patient Education: Go to Patient Education Activity Goal: Patient/Family Education Outcome: Progressing Towards Goal 
  
Problem: Non-Violent Restraints Goal: *Removal from restraints as soon as assessed to be safe Outcome: Progressing Towards Goal 
Goal: *No harm/injury to patient while restraints in use Outcome: Progressing Towards Goal 
Goal: *Patient's dignity will be maintained Outcome: Progressing Towards Goal 
Goal: *Patient Specific Goal (EDIT GOAL, INSERT TEXT) Outcome: Progressing Towards Goal 
Goal: Non-violent Restaints:Standard Interventions Outcome: Progressing Towards Goal 
Goal: Non-violent Restraints:Patient Interventions Outcome: Progressing Towards Goal 
Goal: Patient/Family Education Outcome: Progressing Towards Goal

## 2020-05-06 NOTE — PROGRESS NOTES
1120 - 2nd IV started so that K runs and antibiotics can be given. Patient has not voided since 0100. Patient bladder scanned for 325. Straight cath one time per Gerard Szymanski NP. Patient straight cathed. 325mL of zheng urine obtained. 1249 - BP remains high despite hydralazine. Next dose of PRN labetalol given

## 2020-05-06 NOTE — PROGRESS NOTES
Spoke to MD Han Young and discussed her restraint and fluid orders. MD was notified of this patient high blood pressure (sbp 180s) and patient not producing urine for the past 14 hours. MD Charissa Harkins recommended restarting her NS @ 75ml/hr. 0.5 mg IV ativan and agreed with continue restraint at this time. MD Charissa Harkins recommended to re-assess the patient later and discontinue her restraint if patient is compliant. Patient was made NPO by speech therapist today and will be re-evaluated tomorrow.

## 2020-05-06 NOTE — PROGRESS NOTES
Orthopedic End of Shift Note Verbal shift change report given to [place], RN (oncoming nurse) by Kezia Enamorado RN (offgoing nurse). Report included the following information SBAR, Kardex, Procedure Summary, Intake/Output, MAR, Accordion, Recent Results, Med Rec Status and Cardiac Rhythm a fib. POD#  2 Significant issues during shift: Hypertension (hydralazine and labetalol given, continuous fluids stopped), Urinary retention (patient straight cathed at 1120), agitation/confusion (patient given haldol and put in bilateral wrist restraints overnight) Activity This Shift 
(check all that apply) [] chair 
[] dangle 
 [] bathroom 
[] bedside commode [] hallway [x] bedrest  
Nausea/Vomiting [] yes [x] no    
Voiding Status [] void [] George [x] I&O Cath Bowel Movements [] yes [x] no Foot Pumps or SCD [x] yes [] no Ice Pack [x] yes    [] no Incentive Spirometer [] yes [x] no Telemetry Monitoring   [x] yes [] no Rhythm: a fib Supplemental O2 [x] yes [] no 2L nasal cannula  
 
 
2 negative covid tests. Patient being transferred from [facility] in room 3236 to [facility] (room [phone])

## 2020-05-06 NOTE — PROGRESS NOTES
Telemedicine cross cover: 
Pt noted to have NSVT, check electrolytes in am. 
- Echo: preserved LVEF

## 2020-05-06 NOTE — DISCHARGE INSTRUCTIONS
Discharge Instructions: How to [facility]  Surgery: Hip Fracture Repair  Surgeon:   Leila Flowers MD  Surgery Date:  5/4/2020    [address] [facility] To relieve pain:   Use ice/gel packs.  Put the ice pack directly over the wound, or anywhere you are hurting or swollen.  To control pain and swelling, keep ice on regularly, especially after physical activity.  The packs should stay cold for 3-4 hours. When it is not cold anymore, rotate with the packs in the freezer.  Elevate your leg. This will also keep swelling down.  Rest for at least 20 minutes between activity or exercises.  To keep track of your pain medications, write down what you take and when you take it.  The last dose of pain medication you got in the hospital was:     Medication    Dose    Date & Time           Choose your medications based on the pain scale below:     To keep your pain under control, take Tylenol every 6 hours for 14 days - even if you feel like you dont need it.  For mild to moderate pain (1-6 on pain scale), take one pain pill every 3-4 hours as needed.  For severe pain (7-10 on pain scale), take two pain pills every 3-4 hours as needed.  To prevent nausea, take your pain medications with food. Pain Scale                 As your pain lessens:     Slowly start taking less pain medication. You may do this by waiting longer between doses or by taking smaller doses.  Stop using the pain medications as soon as you no longer need it, usually in 2-3 weeks.  Aspirin   To prevent blood clots, you will need to take Aspirin 325 mg twice a day for 30 days.  To prevent stomach upset or bleeding:   Take Pepcid 20 mg twice a day, or a similar home medication, while you are taking a blood thinner.    Do not take non-steroidal anti-inflammatory (NSAID) medications (Ibuprofen, Advil, Motrin, Naproxen, etc.) OPSITE (Honeycomb dressing)     Keep your clear, waterproof dressing in place for 5-7 days after your leave the hospital.      If you are still having drainage, you will need to change your dressing in 5-7 days. You will be given one extra dressing to use at home.  If there is no more drainage from the wound, you may leave it open to the air. OPSITE DRESSING INSTRUCTIONS                                     DO NOTs:   Do not rub your surgical wound   Do not put lotion or oils on your wound.  Do not take a tub bath or go swimming until your doctor says it is ok.  You may shower with this dressing over your wound.  After showering pat the dressing dry.  If you have staples a home health nurse will remove them in about 10 days.  To increase and promote healing:     Stop Smoking (or at least cut back on       Smoking).  Eat a well-balanced diet (high in protein       and vitamin C).  If you have a poor appetite, drink Ensure, Glucerna, or CarnationInstant Breakfast for the next 30 days.  If you are diabetic, you should control your blood         sugars to prevent infection and help your wound         to heal.       To prevent constipation, stay active & drink plenty of fluid.  While using pain medications, you should also take stool softeners and laxatives, such as Pericolace and Miralax.  If you are having too many bowel movements, then you may need  to stop taking the laxatives.  You should have a bowel movement 3-4 days after surgery and then at least every other day while on pain medication.  To improve your recovery, you must be active!  WEIGHT BEARING   As Tolerated = You can put as much weight on your leg as you can tolerate while walking.          THERAPY   If you go to a rehab facility, physical therapy (PT) will need to work with you daily, and sometimes twice a day to teach you how to:     Get in and out of the bed   Walk (gait training) and climb stairs   Do exercises and gain strength   Use a walker, crutches or cane     You may also need to have occupational therapy (OT) work with you to help you practice:     Getting on and off the toilet   Self-care (brushing teeth, eating, bathing, etc)     If you go directly home, home health physical therapy will come the day after you leave the hospital.  They will visit about 4 times over the next couple of weeks to teach you how to get out of bed, to safely walk in your home, and to do your exercises.  NO DRIVING until your surgeon tells you it is ok.  You can return to work when cleared by a physician.  Please call your physician immediately if you have:     Constant bleeding from your wound.  Increasing redness or swelling around your wound (Some warmth, bruising and swelling is normal).  Change in wound drainage (increase in amount, color, or bad smell).  Change in mental status (unusual behavior)     Temperature over 101.5 degrees Fahrenheit     Increased pain, swelling, or redness in the calf (back of your lower leg), thigh, ankle or foot.  Emergency: CALL 911 if you have:   Shortness of breath   Chest pain when you cough or taking a deep breath     Please call your surgeons office at  [phone] for a follow up appointment.  You should call as soon as you get home or the next day after discharge. Ask to make an appointment in 2 weeks.

## 2020-05-06 NOTE — PROGRESS NOTES
Patient had 8 beats of V-tach. Patient is asleep. She received 20 mg of labetalol earlier for HTN and 0.5 mg of Ativan for agitation. BP been high. MD Hospitalist was notified by page. Success A new connect request was successfully created for: Filiberto Patt : 1950 MRN: [id number] ConnectID: [id number] REASON: Other Emergent ACUITY: 30 Minutes SUBMITTED: 2020 02:25 EDT CLOSE

## 2020-05-06 NOTE — PROGRESS NOTES
Patient very restless all night and attempting to get up. Patient is identified as high fall risk with limited mobility. Patient continue to attempt to remove lines. Less restrictive interventions were made such as redirection/re-orientation, lights deemed, offered regular toilet, offer PO fluids, and offer blanket. Patient is confused and there was no evidence of understanding. Patient continue to self-destructive-imminent risk of harm to self. Patient was placed on roll belt and BUE soft restraint. Patient received two doses of PRN Labetalol for HTN. Patient had approximately 2 hours of sleep in the past twelve hours.

## 2020-05-06 NOTE — PROGRESS NOTES
Patients lungs very coarse and congested. Oxygenating at 98% on 2L NC. Patient in restraints for confusion. No speech this AM aside from stating her name but restless. Patient bladder scanned for 291. Order to straight cath if 400 or greater. Will continue to monitor. SBP greated than 180. Labetalol already given at 0600 and patient unable to take PO at this time. Fluids turned down to 15mL/hr. Dr. Gris Smith notified. Dr. Gris Smith states he will add PRN hydralazine. Fluids stopped per Dr. Gris Smith Patient very restless in bed. Wrist restraints on Will administer haldol and hydralazine

## 2020-05-06 NOTE — PROGRESS NOTES
Patient pulled her IV twice and had blood everywhere. Also, patient is attempting to leave. Another IV was placed on her LUE and patient had a full CHG bath. Patient is confused and unable to redirect her. Patient demonstrated a very poor safety awareness and continue attempting to pull her lines. Patient was placed on roll belt and BUE soft restraint. This writer notified MD Hospitalist on call Rita Frances) Success A new connect request was successfully created for: Wesly García : 1950 MRN: [id number] ConnectID: [id number] REASON: Other Emergent ACUITY: 30 Minutes SUBMITTED: 2020 20:26 EDT CLOSE

## 2020-05-06 NOTE — PROGRESS NOTES
20 mg of labetalol was given while DAQUAN Fuentes was monitoring the patient's ECG for changes. No new changes were noted.

## 2020-05-07 NOTE — PROGRESS NOTES
Pt bladder scan at 0200 showed 289 mL of urine. 5220 - Pt still has not voided. MD paged via telehospitalist to obtain orders for gil catheter, as this is third time patient will have been straight cathed. 0320 - Received call back from telehospitalist, orders for gil catheter deferred to daytime hospitalist. Verbal orders received to wait to straight cath, and give patient more time to void. Will continue to monitor patient and reassess need for straight cath.

## 2020-05-07 NOTE — PROGRESS NOTES
Hospitalist Progress Note NAME: Maria Luisa Rojas :  1950 MRN:  [id number] Interim Hospital Summary: 69 y.o. female whom presented on 2020 with dementia, DM, HTN, hyperlipidemia, CAD, who presents from a nursing home with right hip pain. She doesn't provide any history. She had multiple falls yesterday and was noted to have a fever and a cough. X-rays were taken there and reportedly showed a right hip fracture Assessment / Plan: 
Acute metabolic encephalopathy likely related to underlying dementia with delirium 
-Cont measures for prevention of delirium. Open blinds in a.m. Reorient. 
-Cont  as needed Haldol for agitation. Consider restraints if she gets agitated -Minimize sedatives. Adequate pain control Enterococcus UTI 
-Urine cx growing Enterococcus and now on Ampicillin until  Right femoral intertrochanteric fracture s/p ORIF 
-post op care per ortho. 
-On asa for dvt px and now off Plavix per cards recommendations. Per nursing, she is able to take p.o. aspirin 
-Hb is 8.0 this morning COVID 19 rule  out  
-SARS-CoV-2 negative x2. Chest x-ray shows bilateral infiltrates. Patient is on 2 L nasal cannula. 
-We will give 1 dose of Lasix due to crackles in upper lobes HTN uncontrolled 
-On prn labetalol and hydralazine. 
-Hold IV metoprolol and can continue p.o. blood pressure medications 
-Cont metoprolol. Increase Norvasc to 10 mg daily Urinary retention 
-She has been straight cathed 3 times so far so we will place George catheter and follow nurse driven protocol Persistent A-fib CAD Hyperlipidemia 
-Cont statin Type 2 DM uncontrolled - A1C 9.0 
-Cont lispro SSI. Continue Lantus 5 units. Hx of CVA 
-Cont asa in place of plavix Dementia  
- baseline; knows herself and place, able to tell daughter's name Lactic acidosis; resolved peak 4.7 -> 1.7 Estimated body mass index is 25.35 kg/m² as calculated from the following: Height as of this encounter: 5' 3\" (1.6 m). Weight as of this encounter: 64.9 kg (143 lb 1.3 oz). Code status: DNR Prophylaxis: SCD's/ Jyoti Ledbetter Recommended Disposition: LTC vs home with home health in the next 1 to 2 days feeling better Subjective: Chief Complaint / Reason for Physician Visit Had agitation last night but is now pleasant Had urinary retention last night She is now having cough with some congestion Objective: VITALS:  
Last 24hrs VS reviewed since prior progress note. Most recent are: 
Patient Vitals for the past 24 hrs: 
 Temp Pulse Resp BP SpO2  
05/07/20 1109 96.5 °F (35.8 °C) 63 20 165/81 100 % 05/07/20 0909    186/89   
05/07/20 0800 97.3 °F (36.3 °C) 93 18 (!) 195/100 98 % 05/07/20 0509  70  178/77   
05/07/20 0406  (!) 104  (!) 189/94 98 % 05/07/20 0333 98.1 °F (36.7 °C) 100 18 (!) 190/91 98 % 05/06/20 2333 97.7 °F (36.5 °C) 100 18 169/81 98 % 05/06/20 2018     95 % 05/06/20 1904 97.8 °F (36.6 °C) 93 18 187/86 100 % 05/06/20 1600 97.4 °F (36.3 °C) 83 18 177/90 97 % Intake/Output Summary (Last 24 hours) at 5/7/2020 1512 Last data filed at 5/7/2020 6035 Gross per 24 hour Intake 300 ml Output 500 ml Net -200 ml PHYSICAL EXAM: 
General: Drowsy EENT:  EOMI. Anicteric sclerae. MMM Resp:  CTA bilaterally, no wheezing or rales. No accessory muscle use CV:             irregular  rhythm,  No edema GI:  Soft, Non distended, Non tender. +Bowel sounds Neurologic:  Drowsy now but wakes up to commands Psych:   confused Skin:  Incision is clean and dry Reviewed most current lab test results and cultures  YES Reviewed most current radiology test results   YES Review and summation of old records today    NO Reviewed patient's current orders and MAR    YES 
PMH/ reviewed - no change compared to H&P 
________________________________________________________________________ Care Plan discussed with: 
  Comments Patient y Family RN y   
Care Manager Consultant Multidiciplinary team rounds were held today with , nursing, pharmacist and clinical coordinator. Patient's plan of care was discussed; medications were reviewed and discharge planning was addressed. ________________________________________________________________________ Avelino Donohue MD  
 
Procedures: see electronic medical records for all procedures/Xrays and details which were not copied into this note but were reviewed prior to creation of Plan. LABS: 
I reviewed today's most current labs and imaging studies. Pertinent labs include: 
Recent Labs 05/06/20 2223 05/05/20 
4794 WBC  --  11.5* HGB 8.0* 7.9* HCT  --  24.5*  
PLT  --  424* Recent Labs 05/06/20 
5306 05/05/20 2002  141  
K 3.6 3.4*  
* 111* CO2 18* 18* * 217* BUN 26* 16  
CREA 0.75 0.63 CA 9.1 8.7 MG 2.1  --   
 
 
Signed: )Avelino Donohue MD

## 2020-05-07 NOTE — PROGRESS NOTES
Pt requesting something to drink, attempted to give small spoonful of thickened water.  Pt would not swallow water, but was able to spit out water upon request.

## 2020-05-07 NOTE — PROGRESS NOTES
Bedside and Verbal shift change report given to Mariela Holman (oncoming nurse) by Sherry Garcia RN (offgoing nurse). Report included the following information SBAR, Kardex, OR Summary, Procedure Summary, Intake/Output, MAR and Recent Results.

## 2020-05-07 NOTE — PROGRESS NOTES
Pt very restless and attempting to get out of bed. Pt attemping to grab anything within reach. Pt at risk for accidental self harm, PRN haldol given

## 2020-05-07 NOTE — PROGRESS NOTES
Ortho / Neurosurgery NP Note POD# 3  s/p RIGHT FEMUR INSERTION INTRA MEDULLARY NAIL Off isolation - COVID test negative x2 Pt resting in bed, eyes open to voice. Oriented to self only - Hx Dementia Several attempts to get oob overnight - Given haldol this morning, roll belt in place. Denies pain. Per nursing, patient not swallowing thickened liquids when offered, able to spit out, has been pocketing food as well - Speech following Afebrile. Room air. Remote tele - NSR on monitor in 70s HTN - prn hydralazine and labetalol per IM Visit Vitals /77 Comment: Scheduled metoprolol given Pulse 70 Temp 98.1 °F (36.7 °C) Resp 18 Ht 5' 3\" (1.6 m) Wt 64.9 kg (143 lb 1.3 oz) SpO2 98% BMI 25.35 kg/m² Voiding status: straight cath x2 - may need gil reinserted Output (mL) Urine Voided: 0 ml (05/06/20 1500) Last Bowel Movement Date: (unable to report) (05/06/20 0746) Unmeasurable Output Urine Occurrence(s): 1 (05/06/20 0112) Stool Occurrence(s): 0 (05/06/20 0112) Straight Cath Straight Cath: Nurse performed cath (05/07/20 0534) Number of Attempts: 1 (05/07/20 0534) Catheter Size: 16 FR (05/07/20 0534) Time Catheter Inserted: 1845 (05/07/20 0534) Time Catheter Removed: 0530 (05/07/20 0534) Urine: 350 mL (05/07/20 0534) Labs Lab Results Component Value Date/Time HGB 8.0 (L) 05/06/2020 03:25 AM  
  
Lab Results Component Value Date/Time INR 1.1 05/03/2020 02:27 AM  
  
Lab Results Component Value Date/Time Sodium 144 05/06/2020 03:25 AM  
 Potassium 3.6 05/06/2020 03:25 AM  
 Chloride 115 (H) 05/06/2020 03:25 AM  
 CO2 18 (L) 05/06/2020 03:25 AM  
 Glucose 226 (H) 05/06/2020 03:25 AM  
 BUN 26 (H) 05/06/2020 03:25 AM  
 Creatinine 0.75 05/06/2020 03:25 AM  
 Calcium 9.1 05/06/2020 03:25 AM  
 
Recent Glucose Results:  
Lab Results Component Value Date/Time  GLUCPOC 311 (H) 05/07/2020 06:02 AM  
 GLUCPOC 285 (H) 05/06/2020 09:20 PM  
 Gilmer Lerner 244 (H) 05/06/2020 04:25 PM  
 
CXR (5/5) - bilateral interstitial infiltrates Body mass index is 25.35 kg/m². : A BMI > 30 is classified as obesity and > 40 is classified as morbid obesity. Audible upper airway congestions on entering room. Anterior upper lungs very coarse, rhonchi with occasional inspiratory wheezing auscultated Posterior upper/mid/lower lungs clear, diminished in bases Opsite dressings removed by patient - staples c/d/i Calves soft and supple; No pain with passive stretch Assessment limited due to cognition and sedation - moves all extremities SCDs for mechanical DVT proph while in bed PLAN: 
1) PT BID, OT - WBAT 2) Aspirin 325 mg PO BID for DVT Prophylaxis - if unable to take PO, consider switching to Lovenox/Heparin 3) GI Prophylaxis - Protonix 4) Dementia - fall prevention/safety, bed alarm. Haldol given, roll belt and restraints in place. 5) Urinary Retention - straight cath x 2, +UTI, receiving ampilicin, may need gil re-inserted if unable to void 6) Readniess for discharge: 
   [x] Vital Signs stable  
 [x] Hgb stable  
 [] + Voiding - straight cath x2, may need gil [x] Wound intact, drainage minimal  
 [x] Tolerating PO intake   
 [] Cleared by PT (OT if applicable) [] Stair training completed (if applicable) [] Independent / Contact Guard Assist (household distance) [] Bed mobility [] Car transfers  
  [] ADLs [x] Adequate pain control on oral medication alone Plans to return to SNF when medically stable.  
  
 
Jose Luis Terrell NP

## 2020-05-07 NOTE — PROGRESS NOTES
Problem: Dysphagia (Adult) Goal: *Acute Goals and Plan of Care (Insert Text) Description: 5/5/2020 Speech path goals 1. Patient will participate with reeval of swallowing. MET 2. Patient will tolerate sips and chips with no overt s/s of aspiration. MET 3. Added 5/7/2020 patient will tolerate a puree diet/ thin liquid free of s/s aspiration Outcome: Progressing Towards Goal 
 SPEECH LANGUAGE PATHOLOGY DYSPHAGIA TREATMENT Patient: Raya Lopez (69 y.o. female) Date: 5/7/2020 Diagnosis: Closed right hip fracture ([address] Alina [place] Closed right hip fracture ([address] Procedure(s) (LRB): 
RIGHT FEMUR INSERTION INTRA MEDULLARY NAIL (Right) 3 Days Post-Op Precautions:    
 
ASSESSMENT: 
Rn reporting occasional oral holding. Arrived to room and patient pulling food particles and pills out of her mouth. She was agreeable to liquids, ice cream and pudding. Patient with prolonged bolus prep and brief oral holding but complete oral clearance. Patient with suspected delayed swallow initiation. No overt s/s aspiration with successive straw sips of thin or puree. Patient with chest congestion throughout session and even in the absence of PO. Encouraged cough. RN to arrange breathing tx. Risk of aspiration is increased given mental status. PLAN: 
Recommendations and Planned Interventions: 
-- Continue Puree diet/ thin liquids. Single straw sips ok 
-- STRICT upright PO with any PO!! Feeding asssit - CHECK FOR POCKETING 
-- CRUSH MEDS Patient continues to benefit from skilled intervention to address the above impairments. Continue treatment per established plan of care. Discharge Recommendations:  Thad Lovett SUBJECTIVE:  
Patient stated you are pretty. OBJECTIVE:  
Cognitive and Communication Status: 
Neurologic State: Alert, Confused Orientation Level: Oriented to person, Disoriented to place, Disoriented to situation, Disoriented to time Cognition: Impaired decision making, Poor safety awareness, Decreased attention/concentration, Memory loss, Follows commands Perception: Appears intact Perseveration: No perseveration noted Safety/Judgement: Lack of insight into deficits Dysphagia Treatment: 
Oral Assessment: 
Oral Assessment Dentition: Edentulous Oral Hygiene: (dry, pooled pill in oral cavity- whole) Lingual: Other (comment)(poor commandn following) Velum: Unable to visualize Mandible: No impairment P.O. Trials: 
Patient Position: up in bed Vocal quality prior to P.O.: No impairment Consistency Presented: Pill/Tablet; Thin liquid;Puree Bolus Acceptance: No impairment Bolus Formation/Control: Impaired Type of Impairment: Delayed Propulsion: Delayed (# of seconds) Oral Residue: None Initiation of Swallow: Delayed (# of seconds) Laryngeal Elevation: Functional 
Aspiration Signs/Symptoms: None(congested prior to PO) Oral Phase Severity: Moderate Pharyngeal Phase Severity : Mild After treatment:  
Patient left in no apparent distress in bed, Call bell within reach, and Nursing notified COMMUNICATION/EDUCATION:  
 
 
The patient's plan of care including recommendations, planned interventions, and recommended diet changes were discussed with: Registered nurse. Cari Macdonald SLP Time Calculation: 19 mins

## 2020-05-07 NOTE — PROGRESS NOTES
Bedside shift change report given to [place] (oncoming nurse) by Rickey Barnett (offgoing nurse). Report included the following information SBAR, Kardex, Intake/Output and MAR.

## 2020-05-07 NOTE — PROGRESS NOTES
Bedside and Verbal shift change report given to [facility] (oncoming nurse) by [facility] (offgoing nurse). Report included the following information SBAR, Kardex, Intake/Output, MAR and Recent Results.

## 2020-05-08 NOTE — PROGRESS NOTES
Speech path/swallowing therapy Patient was seen for swallowing therapy this PM. She was in bed with roll belt in place. She took several sips of thins and a few bolus' of purees but then became very lethargic and we discontinued feeding her due to increased risk to aspirate. She tolerated both thins and ice cream well. Mildly slow oral phase and swallow delay noted. The delay lengthened due to patient's lethargy. At breakfast she ate and drank very well per [facility] staff. At present the patient has upper airway congestion and breathing is slightly labored. She was given lasix due to fluid overload. A/P tolerating po but too lethargic for much po. Follow up Monday. If tolerating purees and thins, will leave on this diet.   
Daly Gomes, SLP

## 2020-05-08 NOTE — ROUTINE PROCESS
Bedside and Verbal shift change report given to [facility] (oncoming nurse) by Gaby Zhao RN (offgoing nurse). Report included the following information SBAR, Kardex, Intake/Output, MAR and Recent Results.

## 2020-05-08 NOTE — PROGRESS NOTES
Transition of Care [address] [place] [address] Per chart review, Pt is a resident at [address] [place] [address]. CM will continue to follow for discharge planning needs. Vito Blakely, BS, [facility]

## 2020-05-08 NOTE — PROGRESS NOTES
Nutrition Assessment: 
 
INTERVENTIONS/RECOMMENDATIONS:  
· Diet consistency per SLP 
· Continue carb restrictions · Continue supplements (glucerna BID and ensure pudding daily) · Consider d/c zinc as pt is COVID negative x2, no documentation of zinc deficiency. Zinc and copper compete for absorption. · Consider consult to CNS with DM management for tighter BG control · Consider a more aggressive bowel regimen if no BM soon ASSESSMENT:  
Chart reviewed. Pt was NPO on 5/5-5/6 due to confusion and pocketing foods. Diet restarted yesterday. She consumed ~50% of breakfast meat and eggs this morning and was working on a [employer] shake, which she enjoys. BG >200, getting lantus 5 units daily plus humalog. Noted for no BM since admission, getting miralax and pericolace x 4 days. Diet Order: Consistent carb, Puree 
% Eaten:   
Patient Vitals for the past 72 hrs: 
 % Diet Eaten 05/07/20 2209 10 % 05/07/20 1500 40 % 05/07/20 0909 25 % Pertinent Medications: [x]Reviewed: Vit C, os-ruperto, lantus, humalog, PPI, miralax, pericolace, zinc 
Pertinent Labs: [x]Reviewed: BG >200, HbA1c 9.0 Food Allergies: [x]NKFA  []Other Last BM:  PTA Edema:   n/a   []RUE   []LUE   []RLE   []LLE Pressure Ulcer:  n/a    [] Stage I   [] Stage II   [] Stage III   [] Stage IV Anthropometrics: Height: 5' 3\" (160 cm) Weight: 64.9 kg (143 lb 1.3 oz) IBW (%IBW):   ( ) UBW (%UBW):   (  %) BMI: Body mass index is 25.35 kg/m². This BMI is indicative of: 
[]Underweight   []Normal   [x]Overweight   [] Obesity   [] Extreme Obesity (BMI>40) Last Weight Metrics: 
Weight Loss Metrics 5/5/2020 Today's Wt 143 lb 1.3 oz  
BMI 25.35 kg/m2 Estimated Nutrition Needs (Based on): 1425 Kcals/day(BMR: 1100 x 1.3) , 70 g(1.2 g/kg) Protein Carbohydrate: At Least 130 g/day  Fluids: 1425 mL/day or per primary team 
 
NUTRITION DIAGNOSES:  
Problem:  Increased nutrient needs Etiology: related to fracture and post op healing Signs/Symptoms: as evidenced by s/p Intramedullary fixation right hip fracture Previous Nutrition Dx:  [] Resolved  [] Unresolved           [x] Progressing NUTRITION INTERVENTIONS: 
Meals/Snacks: General/healthful diet   Supplements: Commercial supplement GOAL:  
consume >50% of meals and ONS 
 
NUTRITION MONITORING AND EVALUATION Food/Nutrient Intake Outcomes: Total energy intake Physical Signs/Symptoms Outcomes: Weight/weight change, Electrolyte and renal profile, Glucose profile, GI 
 
Previous Goal Met: 
 [] Met              [x] Progressing Towards Goal              [] Not Progressing Towards Goal  
Previous Recommendations: 
 [x] Implemented          [] Not Implemented          [] Not Applicable LEARNING NEEDS (Diet, Food/Nutrient-Drug Interaction):  
 [x] None Identified 
 [] Identified and Education Provided/Documented 
 [] Identified and Pt declined/was not appropriate Cultural, Sikh, OR Ethnic Dietary Needs:  
 [x] None Identified 
 [] Identified and Addressed 
 
 [x] Interdisciplinary Care Plan Reviewed/Documented  
 [x] Discharge Planning: Diet consistency per SLP, consistent carb diet (45g CHO per meal) [] Participated in Interdisciplinary Rounds NUTRITION RISK:  
 [x] High      to        [x] Moderate           []  Low  []  Minimal/Uncompromised Kaya Gonzales RDN Pager [phone] Weekend Pager [phone]

## 2020-05-08 NOTE — PROGRESS NOTES
Problem: Falls - Risk of 
Goal: *Absence of Falls Description: Document Celia Blood Fall Risk and appropriate interventions in the flowsheet. Outcome: Progressing Towards Goal 
Note: Fall Risk Interventions: 
Mobility Interventions: Assess mobility with egress test, Communicate number of staff needed for ambulation/transfer, OT consult for ADLs, PT Consult for mobility concerns, PT Consult for assist device competence, Patient to call before getting OOB, Bed/chair exit alarm, Strengthening exercises (ROM-active/passive), Utilize walker, cane, or other assistive device, Utilize gait belt for transfers/ambulation Mentation Interventions: Adequate sleep, hydration, pain control, Bed/chair exit alarm, Door open when patient unattended, Update white board, Toileting rounds, Room close to nurse's station, Reorient patient, More frequent rounding, Increase mobility, Evaluate medications/consider consulting pharmacy, Eyeglasses and hearing aids, Familiar objects from home Medication Interventions: Assess postural VS orthostatic hypotension, Bed/chair exit alarm, Evaluate medications/consider consulting pharmacy, Teach patient to arise slowly, Patient to call before getting OOB, Utilize gait belt for transfers/ambulation Elimination Interventions: Bed/chair exit alarm, Call light in reach, Elevated toilet seat, Patient to call for help with toileting needs, Stay With Me (per policy), Toilet paper/wipes in reach, Toileting schedule/hourly rounds History of Falls Interventions: Bed/chair exit alarm, Consult care management for discharge planning, Evaluate medications/consider consulting pharmacy, Door open when patient unattended, Room close to nurse's station, Utilize gait belt for transfer/ambulation, Assess for delayed presentation/identification of injury for 48 hrs (comment for end date), Vital signs minimum Q4HRs X 24 hrs (comment for end date) Problem: Patient Education: Go to Patient Education Activity Goal: Patient/Family Education Outcome: Progressing Towards Goal 
  
Problem: Pressure Injury - Risk of 
Goal: *Prevention of pressure injury Description: Document Jc Scale and appropriate interventions in the flowsheet. Outcome: Progressing Towards Goal 
Note: Pressure Injury Interventions: 
Sensory Interventions: Assess changes in LOC, Assess need for specialty bed, Avoid rigorous massage over bony prominences, Chair cushion, Discuss PT/OT consult with provider, Check visual cues for pain, Float heels, Keep linens dry and wrinkle-free, Maintain/enhance activity level, Use 30-degree side-lying position, Turn and reposition approx. every two hours (pillows and wedges if needed), Pad between skin to skin, Monitor skin under medical devices, Minimize linen layers, Pressure redistribution bed/mattress (bed type) Moisture Interventions: Absorbent underpads, Apply protective barrier, creams and emollients, Assess need for specialty bed, Check for incontinence Q2 hours and as needed, Contain wound drainage, Offer toileting Q_hr, Moisture barrier, Minimize layers, Maintain skin hydration (lotion/cream), Limit adult briefs, Internal/External urinary devices Activity Interventions: Assess need for specialty bed, Chair cushion, Increase time out of bed, Pressure redistribution bed/mattress(bed type), PT/OT evaluation Mobility Interventions: Assess need for specialty bed, Chair cushion, Float heels, Pressure redistribution bed/mattress (bed type), PT/OT evaluation, HOB 30 degrees or less Nutrition Interventions: Document food/fluid/supplement intake, Discuss nutritional consult with provider, Offer support with meals,snacks and hydration Friction and Shear Interventions: Feet elevated on foot rest, Apply protective barrier, creams and emollients, Foam dressings/transparent film/skin sealants, HOB 30 degrees or less, Lift team/patient mobility team, Lift sheet, Minimize layers

## 2020-05-08 NOTE — PROGRESS NOTES
Bedside and Verbal shift change report given to Ashly BUTT (oncoming nurse) by Snehal Wellington (offgoing nurse). Report included the following information SBAR, Kardex, Intake/Output, MAR and Recent Results.

## 2020-05-08 NOTE — ROUTINE PROCESS
Bedside and Verbal shift change report given to [address] [place] [address] (oncoming nurse) by Liya Kirkland RN (offgoing nurse). Report included the following information SBAR, Kardex, OR Summary, Procedure Summary, Intake/Output, MAR and Recent Results.

## 2020-05-08 NOTE — PROGRESS NOTES
Problem: Self Care Deficits Care Plan (Adult) Goal: *Acute Goals and Plan of Care (Insert Text) Description:  
 
FUNCTIONAL STATUS PRIOR TO ADMISSION: Patient resides in [address] [place] [address], with some indication in chart that patient was able to ambulate, but has had multiple falls. Her ADL status is unknown at this time due to patient being unable to report. HOME SUPPORT: Resides in LTC. Occupational Therapy Goals Initiated 5/5/2020 1. Patient will perform grooming seated EOB with moderate assistance  within 7 day(s). 2.  Patient will perform upper body dressing with maximal assistance within 7 day(s). 3.  Patient will perform lower body dressing with maximal assistance within 7 day(s). 4.  Patient will perform toilet/BSC transfers with moderate assistance  within 7 day(s). 5.  Patient will perform all aspects of toileting with maximal assistance within 7 day(s). 6.  Patient will perform sponge bathing with maximal assistance within 7 day(s). Outcome: Progressing Towards Goal 
 
OCCUPATIONAL THERAPY TREATMENT Patient: Enrique Zhang (69 y.o. female) Date: 5/8/2020 Diagnosis: Closed right hip fracture ([address] Arlen [place] Closed right hip fracture ([address] Procedure(s) (LRB): 
RIGHT FEMUR INSERTION INTRA MEDULLARY NAIL (Right) 4 Days Post-Op Precautions:   
Chart, occupational therapy assessment, plan of care, and goals were reviewed. ASSESSMENT Patient was more attentive and able to follow some 1 step commands for participation in OT treatment. Difficulty with initiation and sequencing noted for ADL performance, with patient sometimes unresponsive to cueing for guidance. Although her VSS were stable during this session, her tolerance for activity is very limited. Overall she was max A for dressing ADLs, was supervision/setup for grooming and was CGA to mod A for functional mobility.  Patient continued to be limited by R hip pain, impaired cognition, GW, decreased activity tolerance, decreased safety awareness and decreased balance which is impeding her functional independence. At this time she will need SNF rehab after discharge to maximize her functional performance. PLAN : 
Patient continues to benefit from skilled intervention to address the above impairments. Continue treatment per established plan of care. to address goals. Recommendation for discharge: (in order for the patient to meet his/her long term goals) Therapy up to 5 days/week in SNF setting OBJECTIVE DATA SUMMARY:  
Cognitive/Behavioral Status: 
Neurologic State: Alert Orientation Level: Oriented to person;Disoriented to place; Disoriented to situation;Disoriented to time Cognition: Decreased command following;Decreased attention/concentration; Impaired decision making;Memory loss;Poor safety awareness; Impulsive Safety/Judgement: Decreased awareness of need for safety;Decreased awareness of need for assistance;Decreased awareness of environment Functional Mobility and Transfers for ADLs: 
Bed Mobility: 
Rolling: Minimum assistance Supine to Sit: Minimum assistance Sit to Supine: Minimum assistance Scooting: Contact guard assistance Transfers: 
Sit to Stand: Moderate assistance Side stepped to [facility] with RW with mod A Balance: 
Sitting: Impaired Sitting - Static: Good (unsupported) Sitting - Dynamic: Fair (occasional) Standing: Impaired Standing - Static: Constant support; Fair 
Standing - Dynamic : Constant support; [place] ADL Intervention: 
Grooming Washing Face: Supervision;Set-up(seated EOB) Upper Body Dressing Assistance Shirt simulation with hospital gown: Maximum assistance Lower Body Dressing Assistance Socks: Maximum assistance Position Performed: Seated edge of bed;Bending forward method Cues: Tactile cues provided;Verbal cues provided;Visual cues provided Cognitive Retraining Problem Solving: Deductive reason;General alternative solution; Identifying the problem Organizing/Sequencing: Breaking task down; Two step sequence Attention to Task: Distractibility; Single task Following Commands: Follows one step commands/directions Safety/Judgement: Decreased awareness of need for safety;Decreased awareness of need for assistance;Decreased awareness of environment Cues: Verbal cues provided; Tactile cues provided;Visual cues provided Activity Tolerance:  
Poor Please refer to the flowsheet for vital signs taken during this treatment. After treatment patient left in no apparent distress:  
Supine in bed, Call bell within reach, and Bed / chair alarm activated COMMUNICATION/COLLABORATION:  
The patients plan of care was discussed with: Physical Therapist and Registered Nurse Oni Herndon, OTR/L Time Calculation: 27 mins

## 2020-05-08 NOTE — PROGRESS NOTES
Ortho / Neurosurgery NP Note POD# 4  s/p RIGHT FEMUR INSERTION INTRA MEDULLARY NAIL Off isolation - COVID test negative x2 Pt resting in bed, eyes open to voice. Oriented to self only - Hx Dementia Several attempts to get OOB yesterday, was given haldol early yesterday morning, None received since. Denies pain. Patient has been pocketing food as well - Speech following Afebrile. Room air. Remote tele - NSR on monitor in 70s HTN - prn hydralazine and labetalol per IM Visit Vitals BP (!) 186/91 (BP 1 Location: Left arm, BP Patient Position: At rest) Pulse 87 Temp 98 °F (36.7 °C) Resp 18 Ht 5' 3\" (1.6 m) Wt 64.9 kg (143 lb 1.3 oz) SpO2 96% BMI 25.35 kg/m² Voiding status: straight cath x2 - may need gil reinserted Output (mL) Urine Voided: 200 ml (05/07/20 2125) Last Bowel Movement Date: (unable to report) (05/06/20 0746) Unmeasurable Output Urine Occurrence(s): 1 (05/07/20 0845) Stool Occurrence(s): 0 (05/06/20 0112) Straight Cath Straight Cath: Nurse performed cath (05/07/20 0534) Number of Attempts: 1 (05/07/20 0534) Catheter Size: 16 FR (05/07/20 0534) Time Catheter Inserted: 8904 (05/07/20 0534) Time Catheter Removed: 0530 (05/07/20 0534) Urine: 350 mL (05/07/20 0534) Labs Lab Results Component Value Date/Time HGB 8.0 (L) 05/06/2020 03:25 AM  
  
Lab Results Component Value Date/Time INR 1.1 05/03/2020 02:27 AM  
  
Lab Results Component Value Date/Time Sodium 144 05/06/2020 03:25 AM  
 Potassium 3.6 05/06/2020 03:25 AM  
 Chloride 115 (H) 05/06/2020 03:25 AM  
 CO2 18 (L) 05/06/2020 03:25 AM  
 Glucose 226 (H) 05/06/2020 03:25 AM  
 BUN 26 (H) 05/06/2020 03:25 AM  
 Creatinine 0.75 05/06/2020 03:25 AM  
 Calcium 9.1 05/06/2020 03:25 AM  
 
Recent Glucose Results:  
Lab Results Component Value Date/Time  GLUCPOC 327 (H) 05/08/2020 11:32 AM  
 GLUCPOC 264 (H) 05/08/2020 07:52 AM  
 GLUCPOC 214 (H) 05/07/2020 09:03 PM  
 
 CXR (5/5) - bilateral interstitial infiltrates Body mass index is 25.35 kg/m². : A BMI > 30 is classified as obesity and > 40 is classified as morbid obesity. Audible upper airway congestions on entering room. Anterior upper lungs very coarse, rhonchi with occasional inspiratory wheezing auscultated Posterior upper/mid/lower lungs clear, diminished in bases Attempted to get her to clear with cough. She does cough, but non-productive. Optifoam dressing is c/d/i ; staples underneath not visualized. Calves soft and supple; No pain with passive stretch Assessment limited due to cognition; she is able to move all extremities Reports + sensation in both feet SCDs for mechanical DVT proph while in bed PLAN: 
1) PT BID, OT - WBAT 2) Aspirin 325 mg PO BID for DVT Prophylaxis - if unable to take PO, consider switching to Lovenox/Heparin 3) GI Prophylaxis - Protonix 4) Dementia - fall prevention/safety, bed alarm. Haldol PRN. 5) Urinary Retention -+UTI POA, receiving ampillicin through 5/9, may need gil re-inserted if unable to void 6) Readniess for discharge: 
   [x] Vital Signs stable  
 [x] Hgb stable  
 [x] + Voiding - gil removed at 9 pm - no documented void since then -   
 [x] Wound intact, drainage minimal  
 [x] Diet - Speech following;  \"Continue Puree diet/ thin liquids. Single straw sips ok STRICT upright PO with any PO!! Feeding asssit - CHECK FOR POCKETING 
     CRUSH MEDS\" [x] Adequate pain control on oral medication alone Plans to return to SNF when medically stable.  
  
 
Sergo Blake, AI

## 2020-05-08 NOTE — PROGRESS NOTES
Hospitalist Progress Note NAME: Wil Desai :  1950 MRN:  [id number] Interim Hospital Summary: 69 y.o. female whom presented on 2020 with dementia, DM, HTN, hyperlipidemia, CAD, who presents from a nursing home with right hip pain. She doesn't provide any history. She had multiple falls yesterday and was noted to have a fever and a cough. X-rays were taken there and reportedly showed a right hip fracture Assessment / Plan: 
Acute metabolic encephalopathy likely related to underlying dementia with delirium 
-Cont measures for prevention of delirium. Open blinds in a.m. Reorient. 
-Cont as needed Haldol for agitation.   
-Minimize sedatives. Adequate pain control Enterococcus UTI 
-Urine cx growing Enterococcus and now on Ampicillin until  Right femoral intertrochanteric fracture s/p ORIF 
-post op care per ortho. 
-On asa for dvt px and now off Plavix per cards recommendations. Per nursing, she is able to take p.o. aspirin 
-Hb is 8.0 this morning COVID 19 rule  out  
-SARS-CoV-2 negative x2. Chest x-ray shows bilateral infiltrates. Patient is on 2 L nasal cannula. ? Diastolic CHF 
-Bnp is high and cxr shows edema 
-start lasix 
-start albuterol nebs due to congestion and wheezing HTN uncontrolled 
-On prn labetalol and hydralazine. 
-Hold IV metoprolol and can continue p.o. blood pressure medications 
-Cont metoprolol and Norvasc 10 mg daily Urinary retention 
-George placed this afternoon and follow nurse driven protocol Persistent A-fib CAD Hyperlipidemia 
-Cont statin Type 2 DM uncontrolled - A1C 9.0 
-Cont lispro SSI. Increase lantus to 8 units and add lispro 3 units tid as sugars are high Hx of CVA 
-Cont asa in place of plavix Dementia  
- baseline; knows herself and place, able to tell daughter's name Lactic acidosis; resolved peak 4.7 -> 1.7 Estimated body mass index is 25.35 kg/m² as calculated from the following: Height as of this encounter: 5' 3\" (1.6 m). Weight as of this encounter: 64.9 kg (143 lb 1.3 oz). Code status: DNR Prophylaxis: SCD's/ Adra Giancarlo Recommended Disposition: SNF once Agitation is better. Subjective: Chief Complaint / Reason for Physician Visit Alert and awake but confused Had agitation last nite and pulled George x 2 George placed this am due to urinary retention Objective: VITALS:  
Last 24hrs VS reviewed since prior progress note. Most recent are: 
Patient Vitals for the past 24 hrs: 
 Temp Pulse Resp BP SpO2  
05/08/20 1521 97 °F (36.1 °C) (!) 58 18 168/80 99 % 05/08/20 1455     98 % 05/08/20 1200 97 °F (36.1 °C) (!) 58 18 148/72 98 % 05/08/20 0800 98 °F (36.7 °C) 87 18 (!) 186/91 96 % 05/08/20 0314 97.8 °F (36.6 °C) 63 24 185/80 100 % 05/07/20 2251 97.4 °F (36.3 °C) 60 18 161/72 100 % 05/07/20 1922 98 °F (36.7 °C) (!) 59 18 160/76 98 % Intake/Output Summary (Last 24 hours) at 5/8/2020 1617 Last data filed at 5/8/2020 1500 Gross per 24 hour Intake 1810 ml Output 1250 ml Net 560 ml PHYSICAL EXAM: 
General: Alert and awake EENT:  EOMI. Anicteric sclerae. MMM Resp:  CTA bilaterally, no wheezing or rales. No accessory muscle use CV:             irregular  rhythm,  No edema GI:  Soft, Non distended, Non tender. +Bowel sounds Neurologic:  Alert and awake Psych:   confused Skin:  Incision is clean and dry Reviewed most current lab test results and cultures  YES Reviewed most current radiology test results   YES Review and summation of old records today    NO Reviewed patient's current orders and MAR    YES 
PMH/SH reviewed - no change compared to H&P 
________________________________________________________________________ Care Plan discussed with: 
  Comments Patient y Family RN y   
Care Manager Consultant                   Multidiciplinary team rounds were held today with case manager, nursing, pharmacist and clinical coordinator. Patient's plan of care was discussed; medications were reviewed and discharge planning was addressed. ________________________________________________________________________ Mitra Nix MD  
 
Procedures: see electronic medical records for all procedures/Xrays and details which were not copied into this note but were reviewed prior to creation of Plan. LABS: 
I reviewed today's most current labs and imaging studies. Pertinent labs include: 
Recent Labs 05/06/20 
0325 HGB 8.0* Recent Labs 05/06/20 0325   
K 3.6 * CO2 18* * BUN 26* CREA 0.75 CA 9.1 MG 2.1 Signed: )Mitra Nix MD

## 2020-05-08 NOTE — PROGRESS NOTES
Problem: Mobility Impaired (Adult and Pediatric) Goal: *Acute Goals and Plan of Care (Insert Text) Description: FUNCTIONAL STATUS PRIOR TO ADMISSION: Patient is a poor historian. Hx obtained from chart review only. Patient was modified independent using a rolling walker for functional mobility. She was dependent for self care. The patient was oriented to self and could recognize her daughter. HOME SUPPORT PRIOR TO ADMISSION: The patient lived in [address] [place] [address] at [facility]. Physical Therapy Goals Initiated 5/5/2020 1. Patient will move from supine to sit and sit to supine  in bed with moderate assistance  within 7 day(s). 2.  Patient will transfer from bed to chair and chair to bed with moderate assistance  using the least restrictive device within 7 day(s). 3.  Patient will perform sit to stand with moderate assistance  within 7 day(s). 4.  Patient will ambulate with moderate assistance  for 25 feet with the least restrictive device within 7 day(s). 5.  Patient will follow >50% of 1 step commands for functional tasks within 7 day(s). Outcome: Progressing Towards Goal 
 PHYSICAL THERAPY TREATMENT Patient: Gerber Escalera (69 y.o. female) Date: 5/8/2020 Diagnosis: Closed right hip fracture ([address] Bassam Lee Closed right hip fracture ([address] Procedure(s) (LRB): 
RIGHT FEMUR INSERTION INTRA MEDULLARY NAIL (Right) 4 Days Post-Op Precautions:   
Chart, physical therapy assessment, plan of care and goals were reviewed. ASSESSMENT Patient continues with skilled PT services and is progressing towards goals. Patient continues to be limited by confusion, gait instability, balance impairment and pain. Improved cognition today and at least knows she broke her hip. Able to follow majority of 1 step commands and needs extra time to process. Needing modA x 2 for bed mobility and transfers. Able to amb approx 6 feet today with Rw and modA x 2.   Positioned up in chair with roll belt in place as well as chair alarm pad. Anticipate she will be able to participate in SNF rehab at this time. Will likely still need to return to LTC setting post rehab. Current Level of Function Impacting Discharge (mobility/balance): modA x 2 bed mob, transfers and gait Other factors to consider for discharge: at risk for falls, confused, needs physical assistance to maintain safety with mobility PLAN : 
Patient continues to benefit from skilled intervention to address the above impairments. Continue treatment per established plan of care. to address goals. Recommendation for discharge: (in order for the patient to meet his/her long term goals) Therapy up to 5 days/week in SNF setting IF patient discharges home will need the following DME: rolling walker and wheelchair (if she does not already have) SUBJECTIVE:  
Patient stated My leg hurts. I broke it.  OBJECTIVE DATA SUMMARY:  
Critical Behavior: 
Neurologic State: Alert, Confused Orientation Level: Disoriented to place, Disoriented to situation, Disoriented to time Cognition: Follows commands Safety/Judgement: Lack of insight into deficits Functional Mobility Training: 
Bed Mobility: 
  
  
Sit to Supine: Moderate assistance; Additional time;Assist x2 Transfers: 
Sit to Stand: Moderate assistance;Assist x2 Stand to Sit: Moderate assistance;Assist x2 Balance: 
Sitting: Intact Standing: Impaired Standing - Static: Constant support; Fair 
Standing - Dynamic : Constant support; [address] Ambulation/Gait Training: 
Distance (ft): 6 Feet (ft) Assistive Device: Gait belt;Walker, rolling Ambulation - Level of Assistance: Moderate assistance;Assist x2 Gait Abnormalities: Antalgic;Decreased step clearance; Step to gait Base of Support: Center of gravity altered;Narrowed Stance: Right decreased Speed/Malika: Pace decreased (<100 feet/min); Shuffled; Slow Step Length: Left shortened;Right shortened Activity Tolerance:  
Fair and requires rest breaks Please refer to the flowsheet for vital signs taken during this treatment. After treatment patient left in no apparent distress:  
Sitting in chair, Call bell within reach, Bed / chair alarm activated, and Restraints (roll belt) (alarm pad under patient and notified RN that no bed alarm box available. RN/PCT to look for box) COMMUNICATION/COLLABORATION:  
The patients plan of care was discussed with: Physical therapist, Occupational therapy assistant, and Registered nurse. Vasile Mcgraw PT, DPT Time Calculation: 18 mins

## 2020-05-08 NOTE — PROGRESS NOTES
PER RN\"pt is currently agitated, actively pulling at gil catheter, lines, and attempting to get out of bed. PRN halodol was given and rollbelt has been placed, but pt continues to attempt to get up. \" Restraints ordered.

## 2020-05-08 NOTE — PROGRESS NOTES
Notified Dr. Wilner Amos that bladder scan showed 590 ml of retention. Gil catheter placed and immediate output was 700 and still draining, MD asked if it would be okay to keep gil in placed for retention. Orders given for indwelling gil catheter for acute urinary retention.

## 2020-05-08 NOTE — PROGRESS NOTES
ADULT PROTOCOL: JET AEROSOL  REASSESSMENT Patient  Fay Duggan     69 y.o.   female     5/8/2020  4:45 PM 
 
Breath Sounds Pre Procedure: Right Breath Sounds: Coarse Left Breath Sounds: Coarse Breath Sounds Post Procedure: Right Breath Sounds: Coarse Left Breath Sounds: Coarse Breathing pattern: Pre procedure Breathing Pattern: Regular Post procedure Breathing Pattern: Regular Heart Rate: Pre procedure Pulse: 88 
         Post procedure Pulse: 88 Resp Rate: Pre procedure Respirations: 20 
         Post procedure Respirations: 20 
 
Cough: Pre procedure Cough: Non-productive Post procedure Cough: Non-productive Oxygen: O2 Device: Room air Changed: no 
 
SpO2: Pre procedure SpO2: 98 % Post procedure SpO2: 98 % Nebulizer Therapy: Current medications Changed: no 
 
Problem List:  
Patient Active Problem List  
Diagnosis Code  Closed right hip fracture ([facility] S72.001A  Coronary artery disease involving native coronary artery I25.10  Diabetes mellitus type 2, controlled ([facility] E11.9  Hypertension I10  Dementia ([facility] F03.90  Hyperlipemia E78.5 Respiratory Therapist: Luciano Jacques

## 2020-05-08 NOTE — PROGRESS NOTES
Telehospitalist notified due to pt agitation increase despite PRN haldol given and roll belt utilized. Request for order for wrist restraints placed since pt continues to pull at gil catheter and attempts to get up constantly without assistance. Call back number  given.

## 2020-05-09 NOTE — ROUTINE PROCESS
Bedside and Verbal shift change report given to [facility] (oncoming nurse) by Denys Ling RN (offgoing nurse). Report included the following information SBAR, Kardex, Intake/Output, MAR and Recent Results.

## 2020-05-09 NOTE — PROGRESS NOTES
Problem: Falls - Risk of 
Goal: *Absence of Falls Description: Document Ekaterina Garg Fall Risk and appropriate interventions in the flowsheet. Outcome: Progressing Towards Goal 
Note: Fall Risk Interventions: 
Mobility Interventions: Assess mobility with egress test, Communicate number of staff needed for ambulation/transfer, OT consult for ADLs, PT Consult for mobility concerns, Patient to call before getting OOB, Bed/chair exit alarm, PT Consult for assist device competence, Utilize walker, cane, or other assistive device, Strengthening exercises (ROM-active/passive), Utilize gait belt for transfers/ambulation Mentation Interventions: Adequate sleep, hydration, pain control, Door open when patient unattended, Evaluate medications/consider consulting pharmacy, Bed/chair exit alarm, Eyeglasses and hearing aids, Familiar objects from home, Family/sitter at bedside, Gait belt with transfers/ambulation, Toileting rounds, Update white board, Self-releasing belt, Room close to nurse's station, Reorient patient, More frequent rounding, Increase mobility Medication Interventions: Assess postural VS orthostatic hypotension, Bed/chair exit alarm, Evaluate medications/consider consulting pharmacy, Patient to call before getting OOB, Teach patient to arise slowly, Utilize gait belt for transfers/ambulation Elimination Interventions: Bed/chair exit alarm, Call light in reach, Elevated toilet seat, Patient to call for help with toileting needs, Stay With Me (per policy), Toilet paper/wipes in reach, Urinal in reach, Toileting schedule/hourly rounds History of Falls Interventions: Bed/chair exit alarm, Consult care management for discharge planning, Door open when patient unattended, Evaluate medications/consider consulting pharmacy, Room close to nurse's station, Utilize gait belt for transfer/ambulation, Assess for delayed presentation/identification of injury for 48 hrs (comment for end date), Vital signs minimum Q4HRs X 24 hrs (comment for end date) Problem: Patient Education: Go to Patient Education Activity Goal: Patient/Family Education Outcome: Progressing Towards Goal 
  
Problem: Pressure Injury - Risk of 
Goal: *Prevention of pressure injury Description: Document Jc Scale and appropriate interventions in the flowsheet. Outcome: Progressing Towards Goal 
Note: Pressure Injury Interventions: 
Sensory Interventions: Assess changes in LOC, Assess need for specialty bed, Avoid rigorous massage over bony prominences, Chair cushion, Check visual cues for pain, Discuss PT/OT consult with provider, Float heels, Keep linens dry and wrinkle-free, Maintain/enhance activity level, Minimize linen layers, Monitor skin under medical devices, Pad between skin to skin, Pressure redistribution bed/mattress (bed type), Sit a 90-degree angle/use footstool if needed, Turn and reposition approx. every two hours (pillows and wedges if needed), Use 30-degree side-lying position Moisture Interventions: Absorbent underpads, Assess need for specialty bed, Contain wound drainage, Apply protective barrier, creams and emollients, Check for incontinence Q2 hours and as needed, Internal/External urinary devices, Limit adult briefs, Maintain skin hydration (lotion/cream), Minimize layers, Moisture barrier, Offer toileting Q_hr Activity Interventions: Assess need for specialty bed, Chair cushion, Increase time out of bed, Pressure redistribution bed/mattress(bed type), PT/OT evaluation Mobility Interventions: Assess need for specialty bed, Chair cushion, Float heels, HOB 30 degrees or less, Pressure redistribution bed/mattress (bed type), PT/OT evaluation, Turn and reposition approx. every two hours(pillow and wedges), Trapeze to reposition Nutrition Interventions: Document food/fluid/supplement intake, Discuss nutritional consult with provider, Offer support with meals,snacks and hydration Friction and Shear Interventions: Apply protective barrier, creams and emollients, Feet elevated on foot rest, Foam dressings/transparent film/skin sealants, HOB 30 degrees or less, Lift sheet, Lift team/patient mobility team, Minimize layers, Sit at 90-degree angle

## 2020-05-09 NOTE — PROGRESS NOTES
Po hip fracture. Pain well managed. George reinserted yesterday for retention . Awaiting return to SNF Patient Vitals for the past 24 hrs: 
 Temp Pulse Resp BP SpO2  
05/09/20 0808 97 °F (36.1 °C) 62 18 (!) 214/98 97 % 05/09/20 0328 97.8 °F (36.6 °C) 65 18 139/78 96 % 05/09/20 0104     97 % 05/08/20 2258 98 °F (36.7 °C) (!) 58 18 166/64 100 % 05/08/20 2112     95 % 05/08/20 1954 97.9 °F (36.6 °C) (!) 58 18 157/74 98 % 05/08/20 1521 97 °F (36.1 °C) (!) 58 18 168/80 99 % 05/08/20 1455     98 % 05/08/20 1200 97 °F (36.1 °C) (!) 58 18 148/72 98 % Dressings clean and dry Musculoskeletal: Lay's sign negative in bilateral lower extremities. Calves soft, supple, non-tender upon palpation or with passive stretch. PT 
wbat Asa for dvt Dc back to snf when medically ready

## 2020-05-09 NOTE — PROGRESS NOTES
Dr. Kelley Ing notified via perfect serve about lunch time blood glucose of 385. Order given to administer 10 units once for the sliding scale this afternoon.

## 2020-05-09 NOTE — PROGRESS NOTES
Bedside and Verbal shift change report given to Ashly BUTT (oncoming nurse) by Little Umaña (offgoing nurse). Report included the following information SBAR, Kardex, Intake/Output, MAR and Recent Results.

## 2020-05-09 NOTE — PROGRESS NOTES
Pharmacy Automatic Renal Dosing Protocol - Antimicrobials Indication for Antimicrobials: Enterococcus UTI Current Regimen of Each Antimicrobial: 
Ampicillin  500mg IV Q 6hr  (Start Date 20; Day # ) Previous Antimicrobial Therapy: 
Ceftriaxone 1G IV Daily (Start Date 5/3/20; Day 2 Significant Cultures:  
-Urine: >100,000 Probable Enterococcus 5/3- Blood NG P 
- Resp Panel neg 
- SARS-COV-2 Pending Labs: 
No results for input(s): CREA, BUN, WBC in the last 72 hours. Temp (24hrs), Av.5 °F (36.4 °C), Min:97 °F (36.1 °C), Max:98 °F (36.7 °C) Creatinine Clearance (mL/min) or Dialysis: >50 Impression/Plan:  
Continue with the current dose for UTI. Antimicrobial stop date 20 Pharmacy will follow daily and adjust medications as appropriate for renal function and/or serum levels. Thank you, COSTA BookerD 
 
Recommended duration of therapy 
http://[facility]/[place]/[facility]/Pharmacy/Clinical%20Companion/[email or web address]. docx Renal Dosing 
http://[facility]/[place]/[facility]/Pharmacy/Clinical%20Companion/Renal%[email or web address]. pdf

## 2020-05-10 NOTE — PROGRESS NOTES
Hospitalist Progress Note NAME: Ramonita Riggs :  1950 MRN:  [id number] [facility] Summary: 69 y.o. female whom presented on 2020 with dementia, DM, HTN, hyperlipidemia, CAD, who presents from a nursing home with right hip pain. She doesn't provide any history. She had multiple falls yesterday and was noted to have a fever and a cough. X-rays were taken there and reportedly showed a right hip fracture Assessment / Plan: 
Acute metabolic encephalopathy likely related to underlying dementia with delirium 
-Cont measures for prevention of delirium. Open blinds in a.m. Reorient. 
-Cont as needed Haldol for agitation.   
-Start Seroquel for now due to continued agitation continue George catheter for now and follow nurse driven protocol Enterococcus UTI 
-Urine cx growing Enterococcus and now on Ampicillin until  Right femoral intertrochanteric fracture s/p ORIF 
-post op care per ortho. 
-On asa for dvt px and now off Plavix per cards recommendations. Per nursing, she is able to take p.o. aspirin 
-Hb is 8.0 this morning COVID 19 rule  out  
-SARS-CoV-2 negative x2. Chest x-ray shows bilateral infiltrates. Patient is on 2 L nasal cannula. ? Diastolic CHF 
-Bnp is high and cxr shows edema 
-start lasix 
-start albuterol nebs due to congestion and wheezing HTN uncontrolled 
-On prn labetalol and hydralazine. 
-Hold IV metoprolol and can continue p.o. blood pressure medications 
-Cont metoprolol and Norvasc 10 mg daily Urinary retention 
-George placed this afternoon and follow nurse driven protocol Persistent A-fib CAD Hyperlipidemia 
-Cont statin Type 2 DM uncontrolled - A1C 9.0 
-Cont lispro SSI. Increase lantus to 13  units and add lispro 3 units tid as sugars are high Hx of CVA 
-Cont asa in place of plavix Dementia  
- baseline; knows herself and place, able to tell daughter's name Lactic acidosis; resolved peak 4.7 -> 1.7 Estimated body mass index is 28.43 kg/m² as calculated from the following: 
  Height as of this encounter: 5' 3\" (1.6 m). Weight as of this encounter: 72.8 kg (160 lb 7.9 oz). Code status: DNR Prophylaxis: SCD's/ Abdelrahman Velazquez Recommended Disposition: SNF once Agitation is better. Subjective: Chief Complaint / Reason for Physician Visit Continues to have intermittent agitation She pulled George twice yesterday Blood sugars are higher Blood pressure is also slightly high Objective: VITALS:  
Last 24hrs VS reviewed since prior progress note. Most recent are: 
Patient Vitals for the past 24 hrs: 
 Temp Pulse Resp BP SpO2  
05/09/20 1854 97.4 °F (36.3 °C) (!) 58 18 160/78 99 % 05/09/20 1519 95 °F (35 °C) (!) 56 18 145/72 98 % 05/09/20 1506     96 % 05/09/20 1108 95.1 °F (35.1 °C) (!) 57 18 139/64 100 % 05/09/20 0900     96 % 05/09/20 0808 97 °F (36.1 °C) 62 18 (!) 214/98 97 % 05/09/20 0328 97.8 °F (36.6 °C) 65 18 139/78 96 % 05/09/20 0104     97 % 05/08/20 2258 98 °F (36.7 °C) (!) 58 18 166/64 100 % 05/08/20 2112     95 % Intake/Output Summary (Last 24 hours) at 5/9/2020 2055 Last data filed at 5/9/2020 5999 Gross per 24 hour Intake 1720 ml Output 250 ml Net 1470 ml PHYSICAL EXAM: 
General: Alert and awake EENT:  EOMI. Anicteric sclerae. MMM Resp:  CTA bilaterally, no wheezing or rales. No accessory muscle use CV:             irregular  rhythm,  No edema GI:  Soft, Non distended, Non tender. +Bowel sounds Neurologic:  Alert and awake Psych:   confused Skin:  Incision is clean and dry Reviewed most current lab test results and cultures  YES Reviewed most current radiology test results   YES Review and summation of old records today    NO Reviewed patient's current orders and MAR    YES 
PMH/ reviewed - no change compared to H&P 
________________________________________________________________________ Care Plan discussed with: 
 Comments Patient y Family RN y   
Care Manager Consultant Multidiciplinary team rounds were held today with , nursing, pharmacist and clinical coordinator. Patient's plan of care was discussed; medications were reviewed and discharge planning was addressed. ________________________________________________________________________ Masood Coates MD  
 
Procedures: see electronic medical records for all procedures/Xrays and details which were not copied into this note but were reviewed prior to creation of Plan. LABS: 
I reviewed today's most current labs and imaging studies. Pertinent labs include: No results for input(s): WBC, HGB, HCT, PLT, HGBEXT, HCTEXT, PLTEXT, HGBEXT, HCTEXT, PLTEXT in the last 72 hours. No results for input(s): NA, K, CL, CO2, GLU, BUN, CREA, CA, MG, PHOS, ALB, TBIL, TBILI, SGOT, ALT, INR, INREXT, INREXT in the last 72 hours.  
 
Signed: )Masood Coates MD

## 2020-05-10 NOTE — PROGRESS NOTES
ADULT PROTOCOL: JET AEROSOL ASSESSMENT Patient  Pascual Rutherford     69 y.o.   female     5/9/2020  10:29 PM 
 
Breath Sounds Pre Procedure: Right Breath Sounds: Coarse Left Breath Sounds: Coarse Breath Sounds Post Procedure: Right Breath Sounds: Coarse Left Breath Sounds: Coarse Breathing pattern: Pre procedure Breathing Pattern: Regular Post procedure Breathing Pattern: Regular Heart Rate: Pre procedure Pulse: 60 
         Post procedure Pulse: 63 Resp Rate: Pre procedure Respirations: 20 
         Post procedure Respirations: 20 Peak Flow: Pre bronchodilator Post bronchodilator FVC/FEV1:  n/a Incentive Spirometry:    
     
 
Cough: Pre procedure Cough: Non-productive Post procedure Cough: Non-productive Suctioned: NO Sputum: Pre procedure Post procedure Oxygen: O2 Device: Room air   RA Changed: NO SpO2: Pre procedure SpO2: 98 %   without oxygen Post procedure SpO2: 99 %  without oxygen Nebulizer Therapy: Current medications Aerosolized Medications: Albuterol Changed: NO Smoking History: n/a Problem List:  
Patient Active Problem List  
Diagnosis Code  Closed right hip fracture ([facility] S72.001A  Coronary artery disease involving native coronary artery I25.10  Diabetes mellitus type 2, controlled ([facility] E11.9  Hypertension I10  Dementia ([facility] F03.90  Hyperlipemia E78.5 Respiratory Therapist: Daniel Fowler RT

## 2020-05-10 NOTE — PROGRESS NOTES
Rapid Response Team - Hypothermia Called to see patient in [phone] for a Rectal Temp. Of 95. Glucose done: 200 Patient in the bed, asleep, NAD. RN states that the patient has been very agitated all night and throwing all blankets off throughout the night. Dr. Ron Boyce aware, Orders receive for Warming blanket, UA, lactic, Blood cultures. Results for David Rogers (MRN [id number]) as of 5/10/2020 05:34 Ref. Range 5/5/2020 04:33 5/6/2020 03:25 WBC Latest Ref Range: 3.6 - 11.0 K/uL 11.5 (H) NRBC Latest Ref Range: 0  WBC 0.0   
RBC Latest Ref Range: 3.80 - 5.20 M/uL 2.70 (L) HGB Latest Ref Range: 11.5 - 16.0 g/dL 7.9 (L) 8.0 (L) HCT Latest Ref Range: 35.0 - 47.0 % 24.5 (L) MCV Latest Ref Range: 80.0 - 99.0 FL 90.7 MCH Latest Ref Range: 26.0 - 34.0 PG 29.3 MCHC Latest Ref Range: 30.0 - 36.5 g/dL 32.2 RDW Latest Ref Range: 11.5 - 14.5 % 15.3 (H) PLATELET Latest Ref Range: 150 - 400 K/uL 424 (H) MPV Latest Ref Range: 8.9 - 12.9 FL 10.8 NEUTROPHILS Latest Ref Range: 32 - 75 % 76 (H) LYMPHOCYTES Latest Ref Range: 12 - 49 % 14

## 2020-05-10 NOTE — PROGRESS NOTES
Bedside and Verbal shift change report given to Ashly BUTT (oncoming nurse) by Elke Aguirre (offgoing nurse). Report included the following information SBAR, Kardex, Intake/Output, MAR and Recent Results.

## 2020-05-10 NOTE — PROGRESS NOTES
Problem: Falls - Risk of 
Goal: *Absence of Falls Description: Document Jonathan Sol Fall Risk and appropriate interventions in the flowsheet. Outcome: Progressing Towards Goal 
Note: Fall Risk Interventions: 
Mobility Interventions: Assess mobility with egress test, Communicate number of staff needed for ambulation/transfer, OT consult for ADLs, Bed/chair exit alarm, Patient to call before getting OOB, PT Consult for mobility concerns, PT Consult for assist device competence, Strengthening exercises (ROM-active/passive), Utilize walker, cane, or other assistive device, Utilize gait belt for transfers/ambulation Mentation Interventions: Adequate sleep, hydration, pain control, Bed/chair exit alarm, Door open when patient unattended, Evaluate medications/consider consulting pharmacy, Eyeglasses and hearing aids, Familiar objects from home, Update white board, Toileting rounds, Room close to nurse's station, Reorient patient, More frequent rounding, Increase mobility, Gait belt with transfers/ambulation Medication Interventions: Assess postural VS orthostatic hypotension, Bed/chair exit alarm, Evaluate medications/consider consulting pharmacy, Patient to call before getting OOB, Teach patient to arise slowly, Utilize gait belt for transfers/ambulation Elimination Interventions: Bed/chair exit alarm, Call light in reach, Elevated toilet seat, Patient to call for help with toileting needs, Toilet paper/wipes in reach, Toileting schedule/hourly rounds, Stay With Me (per policy) History of Falls Interventions: Bed/chair exit alarm, Consult care management for discharge planning, Door open when patient unattended, Evaluate medications/consider consulting pharmacy, Room close to nurse's station, Utilize gait belt for transfer/ambulation, Assess for delayed presentation/identification of injury for 48 hrs (comment for end date), Vital signs minimum Q4HRs X 24 hrs (comment for end date) Problem: Patient Education: Go to Patient Education Activity Goal: Patient/Family Education Outcome: Progressing Towards Goal 
  
Problem: Pressure Injury - Risk of 
Goal: *Prevention of pressure injury Description: Document Jc Scale and appropriate interventions in the flowsheet. Outcome: Progressing Towards Goal 
Note: Pressure Injury Interventions: 
Sensory Interventions: Assess changes in LOC, Assess need for specialty bed, Avoid rigorous massage over bony prominences, Chair cushion, Check visual cues for pain, Discuss PT/OT consult with provider Moisture Interventions: Absorbent underpads, Apply protective barrier, creams and emollients, Check for incontinence Q2 hours and as needed, Contain wound drainage, Assess need for specialty bed Activity Interventions: Assess need for specialty bed, Chair cushion, Increase time out of bed, Pressure redistribution bed/mattress(bed type), PT/OT evaluation Mobility Interventions: Assess need for specialty bed, Chair cushion, Float heels, Pressure redistribution bed/mattress (bed type), HOB 30 degrees or less, PT/OT evaluation Nutrition Interventions: Document food/fluid/supplement intake, Discuss nutritional consult with provider, Offer support with meals,snacks and hydration Friction and Shear Interventions: Apply protective barrier, creams and emollients, Feet elevated on foot rest, Foam dressings/transparent film/skin sealants, HOB 30 degrees or less, Lift sheet, Lift team/patient mobility team, Minimize layers Problem: Patient Education: Go to Patient Education Activity Goal: Patient/Family Education Outcome: Progressing Towards Goal 
  
Problem: Patient Education: Go to Patient Education Activity Goal: Patient/Family Education Outcome: Progressing Towards Goal 
  
Problem: Hip Fracture:Day of Admission Pre-op Goal: Off Pathway (Use only if patient is Off Pathway) Outcome: Resolved/Met Goal: Activity/Safety Outcome: Resolved/Met Goal: Consults, if ordered Outcome: Resolved/Met Goal: Diagnostic Test/Procedures Outcome: Resolved/Met Goal: Nutrition/Diet Outcome: Resolved/Met Goal: Medications Outcome: Resolved/Met Goal: Respiratory Outcome: Resolved/Met Goal: Treatments/Interventions/Procedures Outcome: Resolved/Met Goal: Psychosocial 
Outcome: Resolved/Met Goal: *Labs/Tests Within Defined Limits in Preparation for Surgery Outcome: Resolved/Met Goal: *Optimal pain control at patient's stated goal 
Outcome: Resolved/Met Goal: *Hemodynamically stable Outcome: Resolved/Met Problem: Diabetes Self-Management Goal: *Disease process and treatment process Description: Define diabetes and identify own type of diabetes; list 3 options for treating diabetes. Outcome: Progressing Towards Goal 
Goal: *Incorporating nutritional management into lifestyle Description: Describe effect of type, amount and timing of food on blood glucose; list 3 methods for planning meals. Outcome: Progressing Towards Goal 
Goal: *Incorporating physical activity into lifestyle Description: State effect of exercise on blood glucose levels. Outcome: Progressing Towards Goal 
Goal: *Developing strategies to promote health/change behavior Description: Define the ABC's of diabetes; identify appropriate screenings, schedule and personal plan for screenings. Outcome: Progressing Towards Goal 
Goal: *Using medications safely Description: State effect of diabetes medications on diabetes; name diabetes medication taking, action and side effects. Outcome: Progressing Towards Goal 
Goal: *Monitoring blood glucose, interpreting and using results Description: Identify recommended blood glucose targets  and personal targets. Outcome: Progressing Towards Goal 
Goal: *Prevention, detection, treatment of acute complications Description: List symptoms of hyper- and hypoglycemia; describe how to treat low blood sugar and actions for lowering  high blood glucose level. Outcome: Progressing Towards Goal 
Goal: *Prevention, detection and treatment of chronic complications Description: Define the natural course of diabetes and describe the relationship of blood glucose levels to long term complications of diabetes. Outcome: Progressing Towards Goal 
Goal: *Developing strategies to address psychosocial issues Description: Describe feelings about living with diabetes; identify support needed and support network Outcome: Progressing Towards Goal 
Goal: *Insulin pump training Outcome: Progressing Towards Goal 
Goal: *Sick day guidelines Outcome: Progressing Towards Goal 
Goal: *Patient Specific Goal (EDIT GOAL, INSERT TEXT) Outcome: Progressing Towards Goal

## 2020-05-10 NOTE — PROGRESS NOTES
Dr. Kelley Ing notified via perfect serve of this evening's blood glucose of 59. Prn D50 12.5 g was given and brought blood glucose up to 116. Orders given to hold all insulin.

## 2020-05-10 NOTE — PROGRESS NOTES
Called rapid last night for hypothermia. On warming blanket and a little better this am 
covid is negative. A bit confused this morning 
hgb stable Patient Vitals for the past 24 hrs: 
 Temp Pulse Resp BP SpO2  
05/10/20 0826     98 % 05/10/20 0749 95.3 °F (35.2 °C) 61 18 164/72 100 % 05/10/20 0418 95 °F (35 °C) (!) 56 18 121/58 96 % 05/10/20 0301     98 % 05/09/20 2248 97.4 °F (36.3 °C) (!) 57 18 132/59 100 % 05/09/20 2121     98 % 05/09/20 1854 97.4 °F (36.3 °C) (!) 58 18 160/78 99 % 05/09/20 1519 95 °F (35 °C) (!) 56 18 145/72 98 % 05/09/20 1506     96 % 05/09/20 1108 95.1 °F (35.1 °C) (!) 57 18 139/64 100 % Dressing clean and dry Musculoskeletal: Lay's sign negative in bilateral lower extremities. Calves soft, supple, non-tender upon palpation or with passive stretch. Continue medical management 
snf when medically ready 
wbat Asa for dvt

## 2020-05-10 NOTE — PROGRESS NOTES
telemed: Unable to obtain temperature axillary or oral. Requesting order for rectal temperature. plan: chart reviewed, rectal temp ordered

## 2020-05-11 NOTE — PROGRESS NOTES
Spiritual Care Assessment/Progress Note [address] 
 
 
NAME: Mohsen Avila      MRN: [id number] AGE: 69 y.o. SEX: female Yarsani Affiliation: Unknown Language: [place] 5/11/2020     Total Time (in minutes): 10 Spiritual Assessment begun in [facility] through conversation with: 
  
    []Patient        [] Family    [] Friend(s) Reason for Consult: Initial/Spiritual assessment, patient floor Spiritual beliefs: (Please include comment if needed) 
   [] Identifies with a sharif tradition:     
   [] Supported by a sharif community:        
   [] Claims no spiritual orientation:       
   [] Seeking spiritual identity:            
   [] Adheres to an individual form of spirituality:       
   [x] Not able to assess:                   
 
    
Identified resources for coping:  
   [] Prayer                           
   [] Music                  [] Guided Imagery 
   [] Family/friends                 [] Pet visits [] Devotional reading                         [x] Unknown 
   [] Other:                                   
 
 
Interventions offered during this visit: (See comments for more details) Patient Interventions: Initial visit Plan of Care: 
 
 [x] Support spiritual and/or cultural needs  
 [] Support AMD and/or advance care planning process    
 [] Support grieving process 
 [] Coordinate Rites and/or Rituals  
 [] Coordination with community clergy [] No spiritual needs identified at this time 
 [] Detailed Plan of Care below (See Comments)  [] Make referral to Music Therapy 
[] Make referral to Pet Therapy    
[] Make referral to Addiction services 
[] Make referral to [facility] 
[] Make referral to Spiritual Care Partner 
[] No future visits requested       
[x] Follow up visits as needed Comments:  Attempted Initial Spiritual Assessment in Ortho unit. Unable to assess patients needs. No family present at this time.    Left note from [facility]. Chaplains will follow as able and/or as needed. ARY Breaux, [facility], Staff  [facility]  Paging Service  [phone])

## 2020-05-11 NOTE — PROGRESS NOTES
Bedside and Verbal shift change report given to Allan Bates RN (oncoming nurse) by Sherrod Dandy (offgoing nurse). Report included the following information SBAR, Kardex, Intake/Output, MAR and Recent Results.

## 2020-05-11 NOTE — PROGRESS NOTES
Problem: Self Care Deficits Care Plan (Adult) Goal: *Acute Goals and Plan of Care (Insert Text) Description:  
 
FUNCTIONAL STATUS PRIOR TO ADMISSION: Patient resides in [address] [place] [address], with some indication in chart that patient was able to ambulate, but has had multiple falls. Her ADL status is unknown at this time due to patient being unable to report. HOME SUPPORT: Resides in LTC. Occupational Therapy Goals Initiated 5/5/2020 1. Patient will perform grooming seated EOB with moderate assistance  within 7 day(s). 2.  Patient will perform upper body dressing with maximal assistance within 7 day(s). 3.  Patient will perform lower body dressing with maximal assistance within 7 day(s). 4.  Patient will perform toilet/BSC transfers with moderate assistance  within 7 day(s). 5.  Patient will perform all aspects of toileting with maximal assistance within 7 day(s). 6.  Patient will perform sponge bathing with maximal assistance within 7 day(s). 5/11/2020 1724 by Enrique Vu OT Outcome: Progressing Towards Goal 
OCCUPATIONAL THERAPY TREATMENT Patient: Wicho Zarate (69 y.o. female) Date: 5/11/2020 Diagnosis: Closed right hip fracture ([address] Jesenia Oliver Closed right hip fracture ([address] Procedure(s) (LRB): 
RIGHT FEMUR INSERTION INTRA MEDULLARY NAIL (Right) 7 Days Post-Op Precautions:   
Chart, occupational therapy assessment, plan of care, and goals were reviewed. ASSESSMENT Patient continues with skilled OT services and is slowly progressing towards goals. Patient continues to have decreased to no command following (pt with occasional inconsistent command following initially, decreasing over course of session), impaired balance, limited activity tolerance, and poor safety awareness. This session, pt requiring significant assistance for bed mobility and transfer to sit at EOB.  Total assist required to doff/don socks - pt provided with multimodal one-step cues when seated EOB but no pt response noted. Pt initially with fair static sitting balance, but noted to have increased lean with extended sitting - pt appearing fatigued, closing eyes, and becoming more difficult to arouse. Pt returned to bed, left resting comfortably in chair position at end of session. Pending pt mentation and ability to participate in skilled therapy next session, will assess pt appropriateness for continued OT services next session. Current Level of Function Impacting Discharge (ADLs): mod-max assist x2 persons; total assist for lower body dressing Other factors to consider for discharge: poor command following; highly distractible; high fall risk; impaired balance PLAN : 
Patient continues to benefit from skilled intervention to address the above impairments. Continue treatment per established plan of care. to address goals. Recommend with staff: chair position for all meals Recommendation for discharge: (in order for the patient to meet his/her long term goals) Therapy up to 5 days/week in SNF setting This discharge recommendation: 
Has been made in collaboration with the attending provider and/or case management IF patient discharges home will need the following DME: TBD SUBJECTIVE:  
Patient stated Nirav\" when asked her last name. OBJECTIVE DATA SUMMARY:  
Cognitive/Behavioral Status: 
Neurologic State: Drowsy; Restless Orientation Level: Oriented to person;Oriented to place; Disoriented to situation;Disoriented to time Cognition: Decreased command following;Decreased attention/concentration; Impulsive; Impaired decision making Perception: Appears intact Perseveration: No perseveration noted Safety/Judgement: Decreased insight into deficits; Decreased awareness of need for safety; Fall prevention Functional Mobility and Transfers for ADLs: 
Bed Mobility: 
Rolling:  Moderate assistance;Minimum assistance;Assist x2 
 Supine to Sit: Moderate assistance;Minimum assistance;Assist x2 Sit to Supine: Maximum assistance;Assist x2 Scooting: Moderate assistance Transfers: 
Sit to Stand: Moderate assistance;Assist x2 Stand to Sit: Moderate assistance;Assist x2 Bed to Chair: (deferred due to patient confusion) Balance: 
Sitting: Impaired Sitting - Static: Good (unsupported) Sitting - Dynamic: Fair (occasional) Standing: Impaired Standing - Static: Constant support;Poor Standing - Dynamic : Constant support;Poor ADL Intervention: Lower Body Dressing Assistance Dressing Assistance: Total assistance(dependent - no response to multimodal cues and repeated simple commands) Socks: Total assistance (dependent) Position Performed: Seated edge of bed Cues: Verbal cues provided;Visual cues provided; Tactile cues provided;Physical assistance Cognitive Retraining Safety/Judgement: Decreased insight into deficits; Decreased awareness of need for safety; Fall prevention Pain: 
Pt shook head \"no\" when asked about pain -some grimacing noted when standing but pt resting comfortably when repositioned in bed. Activity Tolerance:  
Fair Please refer to the flowsheet for vital signs taken during this treatment. After treatment patient left in no apparent distress:  
Supine in bed, Call bell within reach, Bed / chair alarm activated, Side rails x 3, bed in chair position, soft-restraint in place as prior to session COMMUNICATION/COLLABORATION:  
The patients plan of care was discussed with: Physical therapist and Registered nurse. Carolee Earl OT Time Calculation: 17 mins

## 2020-05-11 NOTE — PROGRESS NOTES
Dr Claudia Dela Cruz notified of rectal temp of 94.5. Gave orders via telephone for medium setting bear hugger. Also notified of low blood sugars of 64, and 88. Instructed to hold sliding scale and with meal dose of 3 units.

## 2020-05-11 NOTE — PROGRESS NOTES
Problem: Mobility Impaired (Adult and Pediatric) Goal: *Acute Goals and Plan of Care (Insert Text) Description: FUNCTIONAL STATUS PRIOR TO ADMISSION: Patient is a poor historian. Hx obtained from chart review only. Patient was modified independent using a rolling walker for functional mobility. She was dependent for self care. The patient was oriented to self and could recognize her daughter. HOME SUPPORT PRIOR TO ADMISSION: The patient lived in [address] [place] [address] at [facility]. Physical Therapy Goals Initiated 5/5/2020 1. Patient will move from supine to sit and sit to supine  in bed with moderate assistance  within 7 day(s). 2.  Patient will transfer from bed to chair and chair to bed with moderate assistance  using the least restrictive device within 7 day(s). 3.  Patient will perform sit to stand with moderate assistance  within 7 day(s). 4.  Patient will ambulate with moderate assistance  for 25 feet with the least restrictive device within 7 day(s). 5.  Patient will follow >50% of 1 step commands for functional tasks within 7 day(s). Outcome: Not Progressing Towards Goal 
 PHYSICAL THERAPY TREATMENT Patient: Irlanda Louis (69 y.o. female) Date: 5/11/2020 Diagnosis: Closed right hip fracture ([address] Cathvernonn Franciea Closed right hip fracture ([address] Procedure(s) (LRB): 
RIGHT FEMUR INSERTION INTRA MEDULLARY NAIL (Right) 7 Days Post-Op Precautions:   
Chart, physical therapy assessment, plan of care and goals were reviewed. ASSESSMENT Patient continues with skilled PT services and is not progressing towards goals. Poor attention to tasks with patient intermittently awake and falling asleep. Provided gentle aarom exercises to BLE with intermittent participation. Transferred supine to sit with mod/min a x 2; sitting balance was good statically - patient started falling asleep in sitting leaning progressively more fwd.  Stood from edge of bed with mod a x 2 using RW for support. Standing balance was poor with patient needing mod/max a to maintain balance. Attempted to step, but patient was unable without manual assistance due to poor weight shifting and direction following. Returned to bed with max a x 2 and repositioned in chair position. Reapplied roll belt and activated bed alarm when the session ended. Current Level of Function Impacting Discharge (mobility/balance): mod/max a x 2 Other factors to consider for discharge: dementia, high fall risk, weakness, poor standing balance PLAN : 
Patient continues to benefit from skilled intervention to address the above impairments. Continue treatment per established plan of care. to address goals. Recommendation for discharge: (in order for the patient to meet his/her long term goals) Therapy up to 5 days/week in SNF setting This discharge recommendation: 
Has been made in collaboration with the attending provider and/or case management IF patient discharges home will need the following DME: to be determined (TBD) SUBJECTIVE:  
Patient stated - she barely spoke OBJECTIVE DATA SUMMARY:  
Critical Behavior: 
Neurologic State: Drowsy, Restless Orientation Level: Oriented to person, Oriented to place, Disoriented to situation, Disoriented to time Cognition: Decreased command following, Decreased attention/concentration, Impulsive, Impaired decision making Safety/Judgement: Decreased insight into deficits, Decreased awareness of need for safety, Fall prevention Functional Mobility Training: 
Bed Mobility: 
Rolling: Moderate assistance;Minimum assistance;Assist x2 Supine to Sit: Moderate assistance;Minimum assistance;Assist x2 Sit to Supine: Maximum assistance;Assist x2 Scooting: Moderate assistance Transfers: 
Sit to Stand: Moderate assistance;Assist x2 Stand to Sit: Moderate assistance Bed to Chair: (deferred due to patient confusion) Balance: Sitting: Impaired Sitting - Static: Good (unsupported) Sitting - Dynamic: Fair (occasional) Standing: Impaired Standing - Static: Constant support;Poor Standing - Dynamic : Constant support;Poor Ambulation/Gait Training: 
  
  
  
  
  
  
Right Side Weight Bearing: As tolerated Left Side Weight Bearing: Full Base of Support: (deferred -  sidestepped only 2 feet) Therapeutic Exercises:  
Aarom: ankle pumps, heel slides, hip abd, glut sets, quad sets Pain Rating: 
Grimaced with wb in standing. Resting comfortably in bed when the session ended. Activity Tolerance:  
Fair, SpO2 stable on RA, and requires frequent rest breaks Please refer to the flowsheet for vital signs taken during this treatment. After treatment patient left in no apparent distress:  
Supine in bed, Call bell within reach, Bed / chair alarm activated, Side rails x 3, and positioned in chair position COMMUNICATION/COLLABORATION:  
The patients plan of care was discussed with: Occupational therapist and Registered nurse. Oneal Jones, PT Time Calculation: 19 mins

## 2020-05-11 NOTE — PROGRESS NOTES
Ortho / Neurosurgery NP Note POD# 7  s/p RIGHT FEMUR INSERTION INTRA MEDULLARY NAIL Off isolation - COVID test negative x2 Pt resting in bed, drowsy. Received haldol this morning for aggitation Assessment greatly limited due to recent haldol and AMS. Hx Dementia Not answering questions, will open eyes on command, limited command following Rolling back in forth in bed frequently, and sitting up - roll belt in place Pt hypoglycemic and hypothermic this morning - IM managing. Bear hugger in place. Now normothermic. Room air. Remote tele - AFib, HR in 62s HTN - prn hydralazine and labetalol per IM Visit Vitals /84 (BP 1 Location: Left arm, BP Patient Position: At rest) Pulse 62 Temp 97.4 °F (36.3 °C) Resp 16 Ht 5' 3\" (1.6 m) Wt 70.1 kg (154 lb 8.7 oz) SpO2 100% BMI 27.38 kg/m² Voiding status: George reinserted for retention Output (mL) Urine Voided: 200 ml (05/07/20 2125) Last Bowel Movement Date: 05/10/20 (05/10/20 1917) Unmeasurable Output Urine Occurrence(s): 1 (05/07/20 0845) Stool Occurrence(s): 1 (05/10/20 0207) Straight Cath Straight Cath: Nurse performed cath (05/07/20 0534) Number of Attempts: 1 (05/07/20 0534) Catheter Size: 16 FR (05/07/20 0534) Time Catheter Inserted: 8576 (05/07/20 0534) Time Catheter Removed: 0530 (05/07/20 0534) Urine: 350 mL (05/07/20 0534) Labs Lab Results Component Value Date/Time HGB 8.7 (L) 05/11/2020 04:07 AM  
  
Lab Results Component Value Date/Time INR 1.1 05/03/2020 02:27 AM  
  
Lab Results Component Value Date/Time Sodium 146 (H) 05/11/2020 04:07 AM  
 Potassium 3.3 (L) 05/11/2020 04:07 AM  
 Chloride 115 (H) 05/11/2020 04:07 AM  
 CO2 24 05/11/2020 04:07 AM  
 Glucose 66 05/11/2020 04:07 AM  
 BUN 22 (H) 05/11/2020 04:07 AM  
 Creatinine 0.61 05/11/2020 04:07 AM  
 Calcium 8.8 05/11/2020 04:07 AM  
 
Recent Glucose Results:  
Lab Results Component Value Date/Time GLU 66 05/11/2020 04:07 AM  
  (H) 05/10/2020 12:27 PM  
 GLUCPOC 84 05/11/2020 07:36 AM  
 GLUCPOC 64 (L) 05/11/2020 07:14 AM  
 GLUCPOC 148 (H) 05/10/2020 09:24 PM  
 
CXR (5/5) - bilateral interstitial infiltrates Body mass index is 27.38 kg/m². : A BMI > 30 is classified as obesity and > 40 is classified as morbid obesity. Audible upper airway congestions on entering room. Anterior upper lungs very coarse, rhonchi Posterior upper/mid/lower lungs coarse with scattered wheezing, diminished in bases Very weak non-productive. Optifoam dressing is c/d/i Calves soft and supple; No pain with passive stretch Assessment limited due to cognition; moving all extremities Abd soft, +BS, BM yesterday SCDs for mechanical DVT proph while in bed PLAN: 
1) PT BID, OT - WBAT 2) Aspirin 325 mg PO BID for DVT Prophylaxis 3) GI Prophylaxis - Protonix 4) Dementia - fall prevention/safety, bed alarm. Haldol PRN. 5) Urinary Retention - UTI POA, ampillicin course completed. Gil reinserted on 5/8 for retention 6) Leukocytosis - WBC 15.5. Blood and urine cultures sent 5/10, so far negative. CXR massiel pleural effusions. IM started on Zosyn today for PNA. 7) Readniess for discharge: 
   [x] Vital Signs stable  
 [x] Hgb stable  
 [] + Voiding - gil for retention  
 [x] Wound intact, drainage minimal  
 [x] Diet - Speech following;  \"Continue Puree diet/ thin liquids. Single straw sips ok STRICT upright PO with any PO!! Feeding asssit - CHECK FOR POCKETING 
     CRUSH MEDS\" [x] Adequate pain control on oral medication alone Plans to return to SNF when medically stable.  
  
 
Devendra Toth NP

## 2020-05-11 NOTE — PROGRESS NOTES
Transition of Care [facility]  
  
Per chart review, Pt is a resident at [facility]. Pt is presenting agitation. CM will continue to follow for discharge planning needs.  
  
  
  
BARBIE Cortes, CM [facility]

## 2020-05-11 NOTE — PROGRESS NOTES
Hospitalist Progress Note NAME: Irlanda Louis :  1950 MRN:  [id number] [facility]im Hospital Summary: 69 y.o. female whom presented on 2020 with dementia, DM, HTN, hyperlipidemia, CAD, who presents from a nursing home with right hip pain. She doesn't provide any history. She had multiple falls yesterday and was noted to have a fever and a cough. X-rays were taken there and reportedly showed a right hip fracture Assessment / Plan: 
Acute metabolic encephalopathy likely related to underlying dementia with delirium 
-Cont measures for prevention of delirium. Open blinds in a.m. Reorient. 
-Cont as needed Haldol for agitation.   
-Start Seroquel for now due to continued agitation continue George catheter for now and follow nurse driven protocol Hypothermia rule out sepsis 
-Check stat CBC. Urine analysis sent. Check blood cultures. Check chest x-ray. Temperature is now improved. -start emp zosyn for sepsis and possible PNA Enterococcus UTI 
-Urine cx growing Enterococcus and now on Ampicillin until  Right femoral intertrochanteric fracture s/p ORIF 
-post op care per ortho. 
-On asa for dvt px and now off Plavix per cards recommendations. Per nursing, she is able to take p.o. aspirin 
-Hb is 8.0 this morning COVID 19 rule  out  
-SARS-CoV-2 negative x2. Chest x-ray shows bilateral infiltrates. Patient is on 2 L nasal cannula. ? Diastolic CHF 
-Bnp is high and cxr shows edema 
-start lasix 
-start albuterol nebs due to congestion and wheezing HTN uncontrolled 
-On prn labetalol and hydralazine. 
-Hold IV metoprolol and can continue p.o. blood pressure medications 
-Cont metoprolol and Norvasc 10 mg daily Urinary retention 
-George placed this afternoon and follow nurse driven protocol Persistent A-fib CAD Hyperlipidemia 
-Cont statin Type 2 DM uncontrolled - A1C 9.0 
-Cont lispro SSI.   Increase lantus to 13  units and add lispro 3 units tid as sugars are high Hx of CVA 
-Cont asa in place of plavix Dementia  
- baseline; knows herself and place, able to tell daughter's name Lactic acidosis; resolved peak 4.7 -> 1.7 Estimated body mass index is 28.43 kg/m² as calculated from the following: 
  Height as of this encounter: 5' 3\" (1.6 m). Weight as of this encounter: 72.8 kg (160 lb 7.9 oz). Code status: DNR Prophylaxis: SCD's/ [place] [facility] Recommended Disposition: SNF once Agitation is better. Subjective: Chief Complaint / Reason for Physician Visit She continues to have intermittent agitation. She is now hypothermic Objective: VITALS:  
Last 24hrs VS reviewed since prior progress note. Most recent are: 
Patient Vitals for the past 24 hrs: 
 Temp Pulse Resp BP SpO2  
05/10/20 1955     98 % 05/10/20 1917 97.6 °F (36.4 °C) (!) 52 18 169/77 100 % 05/10/20 1519 96 °F (35.6 °C) 69 18 138/83 97 % 05/10/20 1445     96 % 05/10/20 1139 97.6 °F (36.4 °C) 61 18 175/76 94 % 05/10/20 0826     98 % 05/10/20 0749 95.3 °F (35.2 °C) 61 18 164/72 100 % 05/10/20 0418 95 °F (35 °C) (!) 56 18 121/58 96 % 05/10/20 0301     98 % 05/09/20 2248 97.4 °F (36.3 °C) (!) 57 18 132/59 100 % 05/09/20 2121     98 % Intake/Output Summary (Last 24 hours) at 5/10/2020 2105 Last data filed at 5/10/2020 7755 Gross per 24 hour Intake  Output 400 ml Net -400 ml PHYSICAL EXAM: 
General: Alert and awake EENT:  EOMI. Anicteric sclerae. MMM Resp:  CTA bilaterally, no wheezing or rales. No accessory muscle use CV:             irregular  rhythm,  No edema GI:  Soft, Non distended, Non tender. +Bowel sounds Neurologic:  Alert and awake Psych:   confused Skin:  Incision is clean and dry Reviewed most current lab test results and cultures  YES Reviewed most current radiology test results   YES Review and summation of old records today    NO Reviewed patient's current orders and MAR    YES 
 PMH/SH reviewed - no change compared to H&P 
________________________________________________________________________ Care Plan discussed with: 
  Comments Patient y Family RN y   
Care Manager Consultant Multidiciplinary team rounds were held today with , nursing, pharmacist and clinical coordinator. Patient's plan of care was discussed; medications were reviewed and discharge planning was addressed. ________________________________________________________________________ Trang Mondragon MD  
 
Procedures: see electronic medical records for all procedures/Xrays and details which were not copied into this note but were reviewed prior to creation of Plan. LABS: 
I reviewed today's most current labs and imaging studies. Pertinent labs include: 
Recent Labs 05/10/20 
1227 WBC 13.4* HGB 8.7* HCT 26.1*  
* Recent Labs 05/10/20 
1227 *  
K 3.3*  
* CO2 25 * BUN 29* CREA 0.71 CA 9.0 ALB 2.6* TBILI 0.4 SGOT 20 ALT 37 Signed: )Trang Mondragon MD

## 2020-05-11 NOTE — PROGRESS NOTES
Hospitalist Progress Note NAME: Gerber Escalera :  1950 MRN:  [id number] Interim Hospital Summary: 69 y.o. female whom presented on 2020 with dementia, DM, HTN, hyperlipidemia, CAD, who presents from a nursing home with right hip pain. She doesn't provide any history. She had multiple falls yesterday and was noted to have a fever and a cough. X-rays were taken there and reportedly showed a right hip fracture. She underwent open reduction and plate fixation. Postoperatively she developed acute metabolic encephalopathy and also hypothermia. Assessment / Plan: 
Acute metabolic encephalopathy likely related to underlying dementia with delirium 
-Cont measures for prevention of delirium. Open blinds in a.m. Reorient. 
-Cont as needed Haldol for agitation.   
-Start Seroquel for now due to continued agitation  
-Remove George to minimize delirium Hypothermia Sepsis possibly due to pneumonia 
-Patient reports cough, has leukocytosis and new effusion on chest x-ray 
-Blood and urine cultures pending 
-Start empiric Zosyn. Follow culture results. Start Bruce soheila. Enterococcus UTI 
-Urine cx growing Enterococcus and now on Ampicillin until  Right femoral intertrochanteric fracture s/p ORIF 
-post op care per ortho. 
-On asa for dvt px and now off Plavix per cards recommendations. Per nursing, she is able to take p.o. aspirin 
-Hb is 8.7 this morning COVID 19 rule  out  
-SARS-CoV-2 negative x2. Chest x-ray shows bilateral infiltrates. Patient is on 2 L nasal cannula. ? Diastolic CHF 
-Bnp is high and cxr shows edema. Echo shows EF of 65 to 70% with mild mitral valve nonspecific thickening. 
-start lasix 
-start albuterol nebs due to congestion and wheezing HTN uncontrolled 
-On prn labetalol and hydralazine. 
-Continue Norvasc, metoprolol Urinary retention 
-Continue bladder scan with straight cath. Remove George. Persistent A-fib CAD Hyperlipidemia -Cont statin Type 2 DM uncontrolled with hypoglycemia 
- A1C 9.0 
-Decrease Lantus to 5 units daily. Hold lispro. Continue sliding scale. Hx of CVA 
-Cont asa in place of plavix Dementia  
- baseline; knows herself and place, able to tell daughter's name Lactic acidosis; resolved peak 4.7 -> 1.7 Estimated body mass index is 27.38 kg/m² as calculated from the following: 
  Height as of this encounter: 5' 3\" (1.6 m). Weight as of this encounter: 70.1 kg (154 lb 8.7 oz). Code status: DNR Prophylaxis: SCD's/ [facility] Recommended Disposition: SNF once Agitation is better and sepsis is resolved in the next 2 to 3 days Subjective: Chief Complaint / Reason for Physician Visit Hypothermic this a.m. Hypoglycemic as well. She was agitated last night and received Haldol. Now drowsy but wakes up to commands. Objective: VITALS:  
Last 24hrs VS reviewed since prior progress note. Most recent are: 
Patient Vitals for the past 24 hrs: 
 Temp Pulse Resp BP SpO2  
05/11/20 1509 96.5 °F (35.8 °C) (!) 57 16 135/68 97 % 05/11/20 1101 96.5 °F (35.8 °C) (!) 55 16 126/62 100 % 05/11/20 0925 97.4 °F (36.3 °C)      
05/11/20 0733 (!) 94.5 °F (34.7 °C) 62 16 158/84 100 % 05/11/20 0449  60  153/74   
05/11/20 0322 97.6 °F (36.4 °C) 60 18 171/77 100 % 05/11/20 0237     96 % 05/11/20 0112  63 18 (!) 144/95 94 % 05/10/20 2344 97.6 °F (36.4 °C) 60 18 182/87 100 % 05/10/20 1955     98 % 05/10/20 1917 97.6 °F (36.4 °C) (!) 52 18 169/77 100 % Intake/Output Summary (Last 24 hours) at 5/11/2020 1545 Last data filed at 5/11/2020 1512 Gross per 24 hour Intake  Output 950 ml Net -950 ml PHYSICAL EXAM: 
General: Alert and awake EENT:  EOMI. Anicteric sclerae. MMM Resp:  CTA bilaterally, no wheezing or rales. No accessory muscle use CV:             irregular  rhythm,  No edema GI:  Soft, Non distended, Non tender. +Bowel sounds Neurologic:  Alert and awake Psych:   confused Skin:  Incision is clean and dry Reviewed most current lab test results and cultures  YES Reviewed most current radiology test results   YES Review and summation of old records today    NO Reviewed patient's current orders and MAR    YES 
PMH/SH reviewed - no change compared to H&P 
________________________________________________________________________ Care Plan discussed with: 
  Comments Patient y Family RN y   
Care Manager Consultant Multidiciplinary team rounds were held today with , nursing, pharmacist and clinical coordinator. Patient's plan of care was discussed; medications were reviewed and discharge planning was addressed. ________________________________________________________________________ Mechelle Almanza MD  
 
Procedures: see electronic medical records for all procedures/Xrays and details which were not copied into this note but were reviewed prior to creation of Plan. LABS: 
I reviewed today's most current labs and imaging studies. Pertinent labs include: 
Recent Labs 05/11/20 
0407 05/10/20 
1227 WBC 15.5* 13.4* HGB 8.7* 8.7* HCT 26.7* 26.1*  
* 678* Recent Labs 05/11/20 
0407 05/10/20 
1227 * 146*  
K 3.3* 3.3*  
* 115* CO2 24 25 GLU 66 126* BUN 22* 29* CREA 0.61 0.71 CA 8.8 9.0 ALB  --  2.6* TBILI  --  0.4 SGOT  --  20 ALT  --  37 Signed: )Mechelle Almanza MD

## 2020-05-11 NOTE — PROGRESS NOTES
ADULT PROTOCOL: JET AEROSOL  REASSESSMENT Patient  Fay Duggan     69 y.o.   female     5/10/2020  9:48 PM 
 
Breath Sounds Pre Procedure: Right Breath Sounds: Coarse, Diminished Left Breath Sounds: Coarse, Diminished Breath Sounds Post Procedure: Right Breath Sounds: Coarse Left Breath Sounds: Coarse Breathing pattern: Pre procedure Breathing Pattern: Regular Post procedure Breathing Pattern: Regular Heart Rate: Pre procedure Pulse: 64 
         Post procedure Pulse: 63 Resp Rate: Pre procedure Respirations: 18 Post procedure Respirations: 20 Peak Flow: Pre bronchodilator Post bronchodilator FVC/FEV1:  n/a Incentive Spirometry:    
     
 
Cough: Pre procedure Cough: Non-productive Post procedure Cough: Non-productive Suctioned: NO Sputum: Pre procedure Post procedure Oxygen: O2 Device: Room air   RA Changed: NO SpO2: Pre procedure SpO2: 98 %   without oxygen Post procedure SpO2: 99 %  without oxygen Nebulizer Therapy: Current medications Aerosolized Medications: Albuterol Changed: NO Smoking History: n/a Problem List:  
Patient Active Problem List  
Diagnosis Code  Closed [facility] fracture [facility] S72.001A  Coronary artery disease involving native coronary artery I25.10  Diabetes mellitus type 2, controlled ([address] E11.9  Hypertension I10  Dementia ([address] F03.90  Hyperlipemia E78.5 Respiratory Therapist: Errol Cortes RT

## 2020-05-11 NOTE — PROGRESS NOTES
Bedside and Verbal shift change report given to Suzy Rubinstein (oncoming nurse) by Elizabeth Posada (offgoing nurse). Report included the following information SBAR, Kardex, Intake/Output, MAR and Recent Results.

## 2020-05-12 NOTE — PROGRESS NOTES
Hospitalist Progress Note NAME: Usama Monique :  1950 MRN:  [id number] Interim [facility] Summary: 69 y.o. female whom presented on 2020 with dementia, DM, HTN, hyperlipidemia, CAD, who presents from a nursing home with right hip pain. She doesn't provide any history. She had multiple falls yesterday and was noted to have a fever and a cough. X-rays were taken there and reportedly showed a right hip fracture. She underwent open reduction and plate fixation. Postoperatively she developed acute metabolic encephalopathy and also hypothermia. Assessment / Plan: 
Acute metabolic encephalopathy POA 
likely related to underlying dementia with delirium 
- Cont measures for prevention of delirium. Open blinds in a.m. - Cont as needed Haldol for agitation.   
- Start Seroquel for now due to continued agitation - Remove George to minimize delirium 
- Ready for discharge, apparently needs SARS-CoV-2 testing before discharge Will order Hypothermia 94.5 Sepsis 5/10 with hypothermia 94.5, rising WBC count 15.5, normal lactate 1.6 
- Patient reports cough, has leukocytosis and new effusion on chest x-ray - Blood and urine cultures negative so far - UA 0-4 WBC, 2+ bacteria 
- day 2 zosyn - CXR Unchanged cardiomegaly and edema Enterococcus UTI 
-Urine cx growing Enterococcus and now on Ampicillin until  Right femoral intertrochanteric fracture s/p ORIF 
-post op care per ortho. 
-On asa for dvt px and now off Plavix per cards recommendations. Per nursing, she is able to take p.o. aspirin 
-8.7 this morning COVID 19 rule  out  
-SARS-CoV-2 negative x2. Chest x-ray shows bilateral infiltrates. Patient is on 2 L nasal cannula. ? Diastolic CHF 
-Bnp is high and cxr shows edema. Echo shows EF of 65 to 70% with mild mitral valve nonspecific thickening. 
-Continue lasix 
-start albuterol nebs due to congestion and wheezing HTN uncontrolled -On prn labetalol and hydralazine. 
-Continue Norvasc, metoprolol Urinary retention 
-Continue bladder scan with straight cath. Remove George. Persistent A-fib CAD Hyperlipidemia 
-Cont statin Type 2 DM uncontrolled with hypoglycemia 
- A1C 9.0 
-Decrease Lantus to 5 units daily. Hold lispro. Continue sliding scale. - BS Hx of CVA 
-Cont asa in place of plavix Dementia  
- baseline; knows herself and place, able to tell daughter's name Lactic acidosis; resolved peak 4.7 -> 1.7 Estimated body mass index is 27.76 kg/m² as calculated from the following: 
  Height as of this encounter: 5' 3\" (1.6 m). Weight as of this encounter: 71.1 kg (156 lb 11.2 oz). Code status: DNR Prophylaxis: SCD's/ [place] Recommended Disposition: SNF once Agitation is better and sepsis is resolved in the next 2 to 3 days Subjective: Chief Complaint / Reason for Physician Visit Being feed, no complaints per NS Not verbal for me Not able to provide history or ROS Objective: VITALS:  
Last 24hrs VS reviewed since prior progress note. Most recent are: 
Patient Vitals for the past 24 hrs: 
 Temp Pulse Resp BP SpO2  
05/12/20 1150 97.9 °F (36.6 °C) (!) 58 16 158/69 97 % 05/12/20 0830 96 °F (35.6 °C) 76 16 152/68 99 % 05/12/20 0301 97.8 °F (36.6 °C) 60 18 113/63 94 % 05/11/20 2328 97.4 °F (36.3 °C) 60 18 132/68 96 % 05/11/20 1509 96.5 °F (35.8 °C) (!) 57 16 135/68 97 % Intake/Output Summary (Last 24 hours) at 5/12/2020 1335 Last data filed at 5/12/2020 2289 Gross per 24 hour Intake 200 ml Output 500 ml Net -300 ml PHYSICAL EXAM: 
General: Alert and awake EENT:  EOMI. Anicteric sclerae. MMM Resp:  CTA bilaterally, no wheezing or rales. No accessory muscle use CV:             irregular  rhythm,  No edema GI:  Soft, Non distended, Non tender. +Bowel sounds Neurologic:  Alert and awake Psych:   confused Skin:  Incision is clean and dry Reviewed most current lab test results and cultures  YES Reviewed most current radiology test results   YES Review and summation of old records today    NO Reviewed patient's current orders and MAR    YES 
PMH/SH reviewed - no change compared to H&P 
________________________________________________________________________ Care Plan discussed with: 
  Comments Patient y Family RN y   
Care Manager Consultant Multidiciplinary team rounds were held today with , nursing, pharmacist and clinical coordinator. Patient's plan of care was discussed; medications were reviewed and discharge planning was addressed. ________________________________________________________________________ Petra Canseco MD  
 
Procedures: see electronic medical records for all procedures/Xrays and details which were not copied into this note but were reviewed prior to creation of Plan. LABS: 
I reviewed today's most current labs and imaging studies. Pertinent labs include: 
Recent Labs 05/12/20 
2588 05/11/20 
0407 05/10/20 
1227 WBC 15.3* 15.5* 13.4* HGB 8.8* 8.7* 8.7* HCT 27.0* 26.7* 26.1*  
* 690* 678* Recent Labs 05/12/20 
3499 05/11/20 
0407 05/10/20 
1227  146* 146*  
K 4.0 3.3* 3.3*  
* 115* 115* CO2 25 24 25  66 126* BUN 22* 22* 29* CREA 0.76 0.61 0.71 CA 8.7 8.8 9.0 ALB  --   --  2.6* TBILI  --   --  0.4 SGOT  --   --  20 ALT  --   --  37 Signed: )Petra Canseco MD

## 2020-05-12 NOTE — PROGRESS NOTES
Bedside and Verbal shift change report given to Chinedu Arguelles (oncoming nurse) by Jaya Mcgill (offgoing nurse). Report included the following information SBAR, Kardex, Intake/Output, MAR and Recent Results.

## 2020-05-12 NOTE — PROGRESS NOTES
Transition of Care Return to [address] [place] [address] sent referral to [facility] to provide update on Pt's status. Pt is currently a LTC resident at [place]. CM was informed by Ebony Crabtree with admissions: [phone] that she would need to verify if they are accepting established Pt's back to facility at this time. Facility is requesting a negative test prior to discharge. CM notified ortho NP. Vito Blakely, BS, [facility]

## 2020-05-12 NOTE — PROGRESS NOTES
1530-Report taken from [facility]. Care assumed, assessment per flowsheet. 1915-Bedside shift change report given to DAQUAN Weber (oncoming nurse) by Michael Pearson (offgoing nurse). Report included the following information SBAR, Kardex, Intake/Output, MAR and Recent Results.

## 2020-05-12 NOTE — PROGRESS NOTES
Speech path Attempted to treat the patient but she was too lethargic. Will follow up tomorrow.  
Latasha Mccollum, SLP

## 2020-05-12 NOTE — PROGRESS NOTES
RAPID RESPONSE TEAM- Follow Up Rounded on patient due to recent rapid response on 5/10 for hypothermia. Spoke with RN. No acute concerns at this time. VSS. MEWS 1. Patient is in bed, NAD. No RRT interventions indicated at this time. Please call back if needed. Geraldyne Mons RN Claudetta Perl Vitals w/ MEWS Score (last day) Date/Time MEWS Score Pulse Resp Temp BP Level of Consciousness SpO2  
 05/12/20 0301    60  18  97.8 °F (36.6 °C)  113/63    94 % 05/11/20 2328  1  60  18  97.4 °F (36.3 °C)  132/68  Alert  96 % 05/11/20 1509  1  (!) 57  16  96.5 °F (35.8 °C)  135/68  Alert  97 % 05/11/20 1101    (!) 55  16  96.5 °F (35.8 °C)  126/62    100 % 05/11/20 0925        97.4 °F (36.3 °C)        
 05/11/20 0733  3  62  16  (!) 94.5 °F (34.7 °C)  158/84  Alert  100 % 05/11/20 0449    60      153/74  Alert    
 05/11/20 0322  1  60  18  97.6 °F (36.4 °C)  171/77  Alert  100 % 05/11/20 0237              96 % 05/11/20 0112    63  18    (!) 144/95  Alert  94 %

## 2020-05-13 NOTE — PROGRESS NOTES
Bedside and Verbal shift change report given to Hi Corral (oncoming nurse) by Hilario Byrd (offgoing nurse). Report included the following information SBAR, Kardex, Intake/Output, MAR and Recent Results.

## 2020-05-13 NOTE — PROGRESS NOTES
Ortho / Neurosurgery NP Note POD# 9  s/p RIGHT FEMUR INSERTION INTRA MEDULLARY NAIL On isolation again with third COVID test pending (done yesterday 5/12) - COVID test negative x 2 previously Pt resting in bed, just returned from Ultrasound,  
Breakfast tray at bedside, but currently NPO awaiting abdominal US results. Hx Dementia. Awake and alert today, following simple commands. Roll belt and bed alarm in place. She pulled own gil out last night and it was reinserted given days of previous urinary retention. Remote tele - AFib, HR in 70s, no ectopy noted HTN - prn hydralazine and labetalol per IM but has not received any since 5/10 Visit Vitals /77 (BP 1 Location: Left arm, BP Patient Position: At rest) Pulse 65 Temp 97.4 °F (36.3 °C) Resp 18 Ht 5' 3\" (1.6 m) Wt 71.1 kg (156 lb 11.2 oz) SpO2 97% BMI 27.76 kg/m² Voiding status: Gil reinserted for retention Output (mL) Urine Voided: 200 ml (05/07/20 2125) Last Bowel Movement Date: 05/10/20 (05/12/20 2043) Unmeasurable Output Urine Occurrence(s): 1 (05/07/20 0845) Stool Occurrence(s): 1 (05/10/20 0207) Straight Cath Straight Cath: Nurse performed cath (05/07/20 0534) Number of Attempts: 1 (05/07/20 0534) Catheter Size: 16 FR (05/07/20 0534) Time Catheter Inserted: 6549 (05/07/20 0534) Time Catheter Removed: 0530 (05/07/20 0534) Urine: 350 mL (05/07/20 0534) Labs Lab Results Component Value Date/Time HGB 7.7 (L) 05/13/2020 01:18 AM  
  
Lab Results Component Value Date/Time INR 1.1 05/03/2020 02:27 AM  
  
Lab Results Component Value Date/Time Sodium 146 (H) 05/13/2020 01:18 AM  
 Potassium 3.6 05/13/2020 01:18 AM  
 Chloride 115 (H) 05/13/2020 01:18 AM  
 CO2 26 05/13/2020 01:18 AM  
 Glucose 52 (L) 05/13/2020 01:18 AM  
 BUN 28 (H) 05/13/2020 01:18 AM  
 Creatinine 0.75 05/13/2020 01:18 AM  
 Calcium 8.8 05/13/2020 01:18 AM  
 
Recent Glucose Results:  
Lab Results Component Value Date/Time GLU 52 (L) 05/13/2020 01:18 AM  
 GLUCPOC 134 (H) 05/13/2020 07:35 AM  
 GLUCPOC 211 (H) 05/12/2020 09:16 PM  
 GLUCPOC 223 (H) 05/12/2020 04:18 PM  
 
CXR (5/5) - bilateral interstitial infiltrates Body mass index is 27.76 kg/m². : A BMI > 30 is classified as obesity and > 40 is classified as morbid obesity. A&O to self. Optifoam dressing is c/d/i Calves soft and supple; No pain with passive stretch Assessment limited due to cognition; moving all extremities. PF/DF/EHL intact Abd soft, +BS, LBM 5/10 SCDs for mechanical DVT proph while in bed PLAN: 
1) PT BID, OT - WBAT 2) Aspirin 325 mg PO BID for DVT Prophylaxis 3) GI Prophylaxis - Protonix 4) Dementia - fall prevention/safety, bed alarm, roll belt. Haldol PRN. 5) Urinary Retention - UTI POA, ampillicin course completed. George reinserted on 5/8 for retention, pulled out by patient and reinserted on 5/12. 6) Leukocytosis - WBC 15.5 (5/11). Blood and urine cultures sent 5/10, so far negative. CXR massiel pleural effusions. IM started on Zosyn today for PNA. No other active signs of infection. Normothermic today. 7) Readniess for discharge: 
   [x] Vital Signs stable  
 [x] Hgb stable  
 [x] George for retention  
 [x] Wound intact, drainage minimal  
 [x] Diet currently NPO pending US- Speech following;  \"Continue Puree diet/ thin liquids. Single straw sips ok STRICT upright PO with any PO!! Feeding asssit - CHECK FOR POCKETING 
     CRUSH MEDS\" [x] Adequate pain control on oral medication alone Will need SNF at discharge - patient from [facility]. Pending 3rd COVID test. 
Pending Abd US for elevated LFTs Stable for discharge from ortho standpoint.   
  
 
Vermell Hatchet, AI

## 2020-05-13 NOTE — PROGRESS NOTES
Nutrition Assessment: 
 
INTERVENTIONS/RECOMMENDATIONS:  
· Continue current diet · Increase glucerna shakes to TID · Please document % meals and supplements consumed in flowsheet I/O's under intake ASSESSMENT:  
Chart reviewed. Pt sleeping during visit attempt. Lunch tray was in room with little consumed from it. There were two empty glucerna bottles on over-bed table. Flowsheet documents appetite and fair. Glucerna increased to TID. Diet Order: Consistent carb, Puree 
% Eaten:   
Patient Vitals for the past 72 hrs: 
 % Diet Eaten 05/12/20 1500 30 % 05/11/20 1700 25 % Pertinent Medications: [x]Reviewed: os-ruperto, lasix, lantus, humalog, miralax, KCl, pericolace, PPI Pertinent Labs: [x]Reviewed: Na 146, BG , elevated LFTs Food Allergies: [x]NKFA  []Other Last BM:  5/10 Edema:  n/a    []RUE   []LUE   []RLE   []LLE Pressure Ulcer:  n/a    [] Stage I   [] Stage II   [] Stage III   [] Stage IV Anthropometrics: Height: 5' 3\" (160 cm) Weight: 71.1 kg (156 lb 11.2 oz) IBW (%IBW):   ( ) UBW (%UBW):   (  %) BMI: Body mass index is 27.76 kg/m². This BMI is indicative of: 
[]Underweight   []Normal   [x]Overweight   [] Obesity   [] Extreme Obesity (BMI>40) Last Weight Metrics: 
Weight Loss Metrics 5/12/2020 Today's Wt 156 lb 11.2 oz  
BMI 27.76 kg/m2 Estimated Nutrition Needs (Based on): 1425 Kcals/day(BMR: 1100 x 1.3) , 70 g(1.2 g/kg) Protein Carbohydrate: At Least 130 g/day  Fluids: 1425 mL/day or per primary team 
 
NUTRITION DIAGNOSES:  
Problem:  Increased nutrient needs Etiology: related to fracture and post op healing Signs/Symptoms: as evidenced by s/p Intramedullary fixation right hip fracture Previous Nutrition Dx:  [] Resolved  [] Unresolved           [x] Progressing NUTRITION INTERVENTIONS: 
Meals/Snacks: General/healthful diet   Supplements: Commercial supplement GOAL:  
consume >50% of meals and ONS 
 
 NUTRITION MONITORING AND EVALUATION Food/Nutrient Intake Outcomes: Total energy intake Physical Signs/Symptoms Outcomes: Weight/weight change, Electrolyte and renal profile, Glucose profile, GI 
 
Previous Goal Met: 
 [] Met              [x] Progressing Towards Goal              [] Not Progressing Towards Goal  
Previous Recommendations: 
 [x] Implemented          [] Not Implemented          [] Not Applicable LEARNING NEEDS (Diet, Food/Nutrient-Drug Interaction):  
 [x] None Identified 
 [] Identified and Education Provided/Documented 
 [] Identified and Pt declined/was not appropriate Cultural, [place], OR Ethnic Dietary Needs:  
 [x] None Identified 
 [] Identified and Addressed 
 
 [x] Interdisciplinary Care Plan Reviewed/Documented  
 [x] Discharge Planning: Consistent carb diet with consistency per SLP [] Participated in Interdisciplinary Rounds NUTRITION RISK:  
 [] High              [x] Moderate           []  Low  []  Minimal/Uncompromised Fannie Parker RDN Pager [phone] Weekend Pager [phone]

## 2020-05-13 NOTE — PROGRESS NOTES
Hospitalist Progress Note NAME: Eulogio Vanegas :  1950 MRN:  [id number] Interim Hospital Summary: 69 y.o. female whom presented on 2020 with dementia, DM, HTN, hyperlipidemia, CAD, who presents from a nursing home with right hip pain. She doesn't provide any history. She had multiple falls yesterday and was noted to have a fever and a cough. X-rays were taken there and reportedly showed a right hip fracture. She underwent open reduction and plate fixation. Postoperatively she developed acute metabolic encephalopathy and also hypothermia. Assessment / Plan: 
Acute metabolic encephalopathy POA 
likely related to underlying dementia with delirium 
- Cont measures for prevention of delirium. Open blinds in a.m. - Cont as needed Haldol for agitation.   
- Start Seroquel for now due to continued agitation - Remove George to minimize delirium 
- Ready for discharge, apparently needs SARS-CoV-2 testing before discharge Will order Hypothermia 94.5 Sepsis 5/10 with hypothermia 94.5, rising WBC count 15.5, normal lactate 1.6 Abnormal LFTs with increased alk phos - Patient reports cough, has leukocytosis and new effusion on chest x-ray - Blood and urine cultures negative so far - UA 0-4 WBC, 2+ bacteria 
- day 3 zosyn, change to - CXR Unchanged cardiomegaly and edema - Abdominal US with Sludge and small stones layer dependently. No wall thickening,dilation, or pericholecystic fluid. COMMON DUCT: 0.4 cm in diameter. The duct is normal caliber. 
- check GGT and fractionated alk phos, ? Alk phos from bone with fracture Enterococcus UTI POA 
-Urine cx growing Enterococcus s/p Ampicillin Right femoral intertrochanteric fracture s/p ORIF 
-post op care per ortho. 
-On asa for dvt px and now off Plavix per cards recommendations. Per nursing, she is able to take p.o. aspirin COVID 19 rule  out -SARS-CoV-2 negative x2. Chest x-ray shows bilateral infiltrates. - Patient is on 2 L nasal cannula. ? Diastolic CHF 
- BNP is high and cxr shows edema. Echo shows EF of 65 to 70% with mild mitral valve nonspecific thickening. 
-Continue lasix 
-start albuterol nebs due to congestion and wheezing HTN uncontrolled 
-On prn labetalol and hydralazine. 
-Continue Norvasc, metoprolol Urinary retention 
-Continue bladder scan with straight cath. Remove George. Persistent A-fib CAD Hyperlipidemia 
-Cont statin Type 2 DM uncontrolled with hypoglycemia 
- A1C 9.0 
-Decrease Lantus to 5 units daily. Hold lispro. Continue sliding scale. - BS Hx of CVA 
-Cont asa in place of plavix Dementia  
- baseline; knows herself and place, able to tell daughter's name Lactic acidosis; resolved peak 4.7 -> 1.7 Estimated body mass index is 27.76 kg/m² as calculated from the following: 
  Height as of this encounter: 5' 3\" (1.6 m). Weight as of this encounter: 71.1 kg (156 lb 11.2 oz). Code status: DNR Prophylaxis: SCD's/ [place] Recommended Disposition: SNF once Agitation is better and sepsis is resolved in the next 2 to 3 days Subjective: Chief Complaint / Reason for Physician Visit Being feed, no complaints per NS Not verbal for me Not able to provide history or ROS Objective: VITALS:  
Last 24hrs VS reviewed since prior progress note. Most recent are: 
Patient Vitals for the past 24 hrs: 
 Temp Pulse Resp BP SpO2  
05/13/20 1254 97.4 °F (36.3 °C) (!) 56 18 146/73 99 % 05/13/20 0800 97.4 °F (36.3 °C) 65 18 159/77 97 % 05/13/20 0400  62 16 181/83 98 % 05/12/20 1947 97.8 °F (36.6 °C) 63 16 163/74 100 % Intake/Output Summary (Last 24 hours) at 5/13/2020 1557 Last data filed at 5/13/2020 0300 Gross per 24 hour Intake 200 ml Output 500 ml Net -300 ml PHYSICAL EXAM: 
General: Alert and awake EENT:   Anicteric sclerae. MMM Resp: CTA bilaterally, no wheezing or rales. No accessory muscle use CV:             irregular  rhythm,  No edema GI:  Soft, Non distended, Non tender. +Bowel sounds Neurologic:  Alert and awake Psych:   confused Skin:  Incision is clean and dry Reviewed most current lab test results and cultures  YES Reviewed most current radiology test results   YES Review and summation of old records today    NO Reviewed patient's current orders and MAR    YES 
PMH/SH reviewed - no change compared to H&P 
________________________________________________________________________ Care Plan discussed with: 
  Comments Patient y Family RN y   
Care Manager Consultant Multidiciplinary team rounds were held today with , nursing, pharmacist and clinical coordinator. Patient's plan of care was discussed; medications were reviewed and discharge planning was addressed. ________________________________________________________________________ Jackelyn Hunter MD  
 
Procedures: see electronic medical records for all procedures/Xrays and details which were not copied into this note but were reviewed prior to creation of Plan. LABS: 
I reviewed today's most current labs and imaging studies. Pertinent labs include: 
Recent Labs 05/13/20 0118 05/12/20 
7749 05/11/20 
0407 WBC 11.9* 15.3* 15.5* HGB 7.7* 8.8* 8.7* HCT 24.7* 27.0* 26.7*  
* 730* 690* Recent Labs 05/13/20 0118 05/12/20 
6933 05/11/20 
0407 * 145 146*  
K 3.6 4.0 3.3*  
* 114* 115* CO2 26 25 24 GLU 52* 100 66 BUN 28* 22* 22* CREA 0.75 0.76 0.61  
CA 8.8 8.7 8.8 ALB 2.7*  --   --   
TBILI 0.5  --   --   
SGOT 71*  --   --   
*  --   --   
 
 
Signed: )Jackelyn Hunter MD

## 2020-05-14 NOTE — PROGRESS NOTES
Problem: Self Care Deficits Care Plan (Adult) Goal: *Acute Goals and Plan of Care (Insert Text) Description:  
 
FUNCTIONAL STATUS PRIOR TO ADMISSION: Patient resides in [address] [place] [address], with some indication in chart that patient was able to ambulate, but has had multiple falls. Her ADL status is unknown at this time due to patient being unable to report. HOME SUPPORT: Resides in LTC. Occupational Therapy Goals Initiated 5/5/2020 1. Patient will perform grooming seated EOB with moderate assistance  within 7 day(s). 2.  Patient will perform upper body dressing with maximal assistance within 7 day(s). 3.  Patient will perform lower body dressing with maximal assistance within 7 day(s). 4.  Patient will perform toilet/BSC transfers with moderate assistance  within 7 day(s). 5.  Patient will perform all aspects of toileting with maximal assistance within 7 day(s). 6.  Patient will perform sponge bathing with maximal assistance within 7 day(s). Outcome: Resolved/Not Met OCCUPATIONAL THERAPY TREATMENT/DISCHARGE Patient: Marianna Vale (69 y.o. female) Date: 5/14/2020 Diagnosis: Closed right hip fracture ([address] Thedore Sever Closed right hip fracture ([address] Procedure(s) (LRB): 
RIGHT FEMUR INSERTION INTRA MEDULLARY NAIL (Right) 10 Days Post-Op Precautions:   
Chart, occupational therapy assessment, plan of care, and goals were reviewed. ASSESSMENT Patient continues with skilled OT services and has not progressed towards goals. She remains greatly limited by her significant cognitive impairments which include an inability to consistently follow simple 1 step commands for active participation in therapy. Patient was essentially max A of 2 to total for all functional mobility and is total A for all ADLs. Patient has been followed by OT on a trial basis this admit and based on her inability to effectively participate in therapy she is no longer appropriate for OT services. Current Level of Function (ADLs/self-care): Patient is max A of 2 to total for all functional mobility and is total A for all ADLs. PLAN : 
Rationale for discharge: Patient not participating in therapy Recommendation for discharge: (in order for the patient to meet his/her long term goals) Long Term Care. SUBJECTIVE:  
Patient non-verbal this session.  OBJECTIVE DATA SUMMARY:  
Cognitive/Behavioral Status: 
Neurologic State: Alert;Confused; Restless Orientation Level: Disoriented X4 Cognition: No command following Safety/Judgement: Lack of insight into deficits; Decreased awareness of environment Functional Mobility and Transfers for ADLs: 
Bed Mobility: 
Rolling: Maximum assistance Supine to Sit: Maximum assistance Sit to Supine: Maximum assistance;Assist x2 Scooting: Total assistance Transfers: 
Sit to Stand: Maximum assistance;Assist x2 Balance: 
Sitting: Impaired Sitting - Static: Fair (occasional) Sitting - Dynamic: Poor (constant support) Standing: Impaired; With support Standing - Static: Poor ADL Intervention: 
Patient not following commands for active participation Activity Tolerance:  
Poor Please refer to the flowsheet for vital signs taken during this treatment. After treatment patient left in no apparent distress:  
Supine in bed, Call bell within reach and Bed / chair alarm activated COMMUNICATION/COLLABORATION:  
The patients plan of care was discussed with: Physical therapist and Registered nurse. Oni Herndon, OTR/L Time Calculation: 15 mins

## 2020-05-14 NOTE — PROGRESS NOTES
ADULT PROTOCOL: JET AEROSOL  REASSESSMENT Patient  Annie Ibanez     69 y.o.   female     5/14/2020  10:21 AM 
 
Breath Sounds Pre Procedure: Right Breath Sounds: Diminished Left Breath Sounds: Diminished Breath Sounds Post Procedure: Right Breath Sounds: Diminished Left Breath Sounds: Diminished Breathing pattern: Pre procedure Breathing Pattern: Regular, Shallow Post procedure Breathing Pattern: Regular Heart Rate: Pre procedure Pulse: 54 
         Post procedure Pulse: 56 Resp Rate: Pre procedure Respirations: 16 Post procedure Respirations: 16 
 
     
 
Cough: Pre procedure Cough: Non-productive, Congested Post procedure Cough: Non-productive, Congested Oxygen: O2 Device: Nasal cannula   2 lpm added Changed: YES SpO2: Pre procedure SpO2: 92 %   without oxygen Post procedure SpO2: (!) 89 %  without oxygen Nebulizer Therapy: Current medications Aerosolized Medications: Albuterol Changed: NO 
 
 
 
Problem List:  
Patient Active Problem List  
Diagnosis Code  Closed right hip fracture ([facility] S72.001A  Coronary artery disease involving native coronary artery I25.10  Diabetes mellitus type 2, controlled ([facility] E11.9  Hypertension I10  Dementia ([facility] F03.90  Hyperlipemia E78.5 Respiratory Therapist: Jairon Aden RT

## 2020-05-14 NOTE — PROGRESS NOTES
1500 - Report rec'd from [place], [address].   
 
19:00 - MRI checklist completed via phone with David Herrera, daughter. Report given to DAQUAN Keller.

## 2020-05-14 NOTE — PROGRESS NOTES
Bedside and Verbal shift change report given to Trinity (oncoming nurse) by Kash Mak (offgoing nurse). Report included the following information SBAR, Kardex, Intake/Output, MAR and Recent Results.

## 2020-05-14 NOTE — PROGRESS NOTES
Problem: Mobility Impaired (Adult and Pediatric) Goal: *Acute Goals and Plan of Care (Insert Text) Description: FUNCTIONAL STATUS PRIOR TO ADMISSION: Patient is a poor historian. Hx obtained from chart review only. Patient was modified independent using a rolling walker for functional mobility. She was dependent for self care. The patient was oriented to self and could recognize her daughter. HOME SUPPORT PRIOR TO ADMISSION: The patient lived in [address] [place] [address] at [facility]. Physical Therapy Goals Initiated 5/5/2020 - remain appropriate 5/14/20 1. Patient will move from supine to sit and sit to supine  in bed with moderate assistance  within 7 day(s). 2.  Patient will transfer from bed to chair and chair to bed with moderate assistance  using the least restrictive device within 7 day(s). 3.  Patient will perform sit to stand with moderate assistance  within 7 day(s). 4.  Patient will ambulate with moderate assistance  for 25 feet with the least restrictive device within 7 day(s). 5.  Patient will follow >50% of 1 step commands for functional tasks within 7 day(s). Outcome: Progressing Towards Goal 
 PHYSICAL THERAPY TREATMENT: WEEKLY REASSESSMENT Patient: Charissa Hartman (69 y.o. female) Date: 5/14/2020 Primary Diagnosis: Closed right hip fracture ([facility] [S72.001A] Procedure(s) (LRB): 
RIGHT FEMUR INSERTION INTRA MEDULLARY NAIL (Right) 10 Days Post-Op Precautions: WBAT    
 
 
ASSESSMENT Patient continues with skilled PT services and is not progressing towards goals. Pt continues to present with significant cognitive deficits (baseline dementia), resulting in complete lack of command following and significant limitations in active participation in skilled intervention. Pt followed 0% of simple, one step commands throughout. Command following did not improve with tactile or manual cues to facilitate initiation.  Overall, pt required maxAx2 for bed mobility and sit<>stand transfer attempts. Pt only able to maintain standing position w/ RW and maxAx2 briefly before returning herself to seated position EOB (flexed posture throughout static stance). Recommend pt return to familiar environment of memory care unit at [address] [place] [address]. Patient's progression toward goals since last assessment: pt is making slow progress, limited largely by cognitive status/baseline dementia Current Level of Function Impacting Discharge (mobility/balance): maxAx2 for bed mobility, maxA x2 for sit<>Stand transfer attempts, unable to ambulate Functional Outcome Measure: The patient scored 5/100 on the Barthel Index outcome measure which is indicative of significant impairments in ADLs and functional mobility. Other factors to consider for discharge: dementia, lives in memory care unit PLAN : 
Goals have been updated based on progression since last assessment. Patient continues to benefit from skilled intervention to address the above impairments. Recommendations and Planned Interventions: bed mobility training, transfer training, gait training, therapeutic exercises, patient and family training/education, and therapeutic activities Frequency/Duration: Patient will be followed by physical therapy:  2 times a week to address goals. Recommendation for discharge: (in order for the patient to meet his/her long term goals) Return to familiar environment of LTC facility (memory care unit) This discharge recommendation: 
Has been made in collaboration with the attending provider and/or case management IF patient discharges home will need the following DME: to be determined (TBD) SUBJECTIVE:  
Patient non verbal throughout OBJECTIVE DATA SUMMARY:  
HISTORY:   
Past Medical History:  
Diagnosis Date CAD (coronary artery disease)  Coronary artery disease involving native coronary artery 5/4/2020 5/2019 at [facility] cardiac cath with  to mid-distal LAD, med management Dementia ([facility] Diabetes ([facility] Diabetes mellitus type 2, controlled ([facility] 2020 Hyperlipemia Hypertension Hypertension 2020 History reviewed. No pertinent surgical history. Personal factors and/or comorbidities impacting plan of care: dementia Home Situation Home Environment: Long term care(Resident at [facility]) One/Two Story Residence: One story EXAMINATION/PRESENTATION/DECISION MAKING:  
Critical Behavior: 
Neurologic State: Alert, Confused, Restless Orientation Level: Disoriented X4 Cognition: No command following Safety/Judgement: Lack of insight into deficits, Decreased awareness of environment Hearing: 
  
Skin:  intact Edema: none noted Range Of Motion: 
  
  
  
  
  
  
  
  
Strength: Tone & Sensation:  
  
  
  
  
  
  
  
  
  
   
Coordination: 
  
Vision:  
  
Functional Mobility: 
Bed Mobility: 
Rolling: Maximum assistance Supine to Sit: Maximum assistance Sit to Supine: Maximum assistance;Assist x2 Scooting: Total assistance Transfers: 
Sit to Stand: Maximum assistance;Assist x2 Stand to Sit: Maximum assistance;Assist x2 Balance:  
Sitting: Impaired Sitting - Static: Fair (occasional) Sitting - Dynamic: Poor (constant support) Standing: Impaired; With support Standing - Static: Poor Ambulation/Gait Training: 
  
  
  
  
  
  
  
  
  
  
  
  
  
Ambulation did not occur. Functional Measure: 
Barthel Index: 
 
Bathin Bladder: 0 Bowels: 0 Groomin Dressin Feedin Mobility: 0 Stairs: 0 Toilet Use: 0 Transfer (Bed to Chair and Back): 5 Total: 5/100 The Barthel ADL Index: Guidelines 1. The index should be used as a record of what a patient does, not as a record of what a patient could do. 2. The main aim is to establish degree of independence from any help, physical or verbal, however minor and for whatever reason. 3. The need for supervision renders the patient not independent. 4. A patient's performance should be established using the best available evidence. Asking the patient, friends/relatives and nurses are the usual sources, but direct observation and common sense are also important. However direct testing is not needed. 5. Usually the patient's performance over the preceding 24-48 hours is important, but occasionally longer periods will be relevant. 6. Middle categories imply that the patient supplies over 50 per cent of the effort. 7. Use of aids to be independent is allowed. Obed Duque., Barthel, DMarceloW. (3642). Functional evaluation: the Barthel Index. [address] (14)2. Adina Riding elin ORLY Macdonald, Sandra Francisco., Bisi Mo., [place], [address] (1999). Measuring the change indisability after inpatient rehabilitation; comparison of the responsiveness of the Barthel Index and Functional [place] Measure. Journal of Neurology, Neurosurgery, and Psychiatry, 66(4), 464-573. ADDIE Olivo, TAMARA Romero, & Jennifer Boudreaux, MMarceloA. (2004.) Assessment of post-stroke quality of life in cost-effectiveness studies: The usefulness of the Barthel Index and the EuroQoL-5D. [facility], 13, 443-42 Pain Rating: 
Unable to rate pain, non verbal throughout Activity Tolerance:  
Poor Please refer to the flowsheet for vital signs taken during this treatment. After treatment patient left in no apparent distress:  
Supine in bed, Heels elevated for pressure relief, Call bell within reach, Bed / chair alarm activated, and Side rails x 3 
 
COMMUNICATION/EDUCATION:  
The patients plan of care was discussed with: Occupational therapist and Registered nurse. Fall prevention education was provided and the patient/caregiver indicated understanding., Patient/family have participated as able in goal setting and plan of care. , and Patient/family agree to work toward stated goals and plan of care. Thank you for this referral. 
Sweta Esteban, PT, DPT Time Calculation: 17 mins

## 2020-05-14 NOTE — PROGRESS NOTES
Speech pathology SLP has been following Ms. Shana Upton and has previously recommended a puree diet/ thin liquids. Patient with waxing and waning mentation which is directly impacting swallow safety. Patient has good days and tolerates purees/thin liquids without difficulty and others such as today where she pockets and is unsafe. Attempted to follow up with patient but she was not arousable. Patient is not appropriate for diet advancement. Recommend continuing on puree diet/ thin liquids, feeding when fully alert and appropriate; otherwise holding. Little for SLP to add at this point given tolerance when alert and accepting. Suspect she will continue to wax and wane.  
 
Fatimah Mata M.S. CCC-SLP

## 2020-05-14 NOTE — PROGRESS NOTES
Patient blood glucose at bedtime is 363. 5 units of Lantus is ordered and Humalog sliding scale order only goes to 350.  Requested order for Humalog through tele hospitalist.

## 2020-05-14 NOTE — ROUTINE PROCESS
-Please complete MRI History and Safety Screening Form for this patient using [employer] only under Orders Requiring a Screening Form: 
 

## 2020-05-14 NOTE — PROGRESS NOTES
Hospitalist Progress Note NAME: Brayan Hilliard :  1950 MRN:  [id number] Interim Hospital Summary: 69 y.o. female whom presented on 2020 with dementia, DM, HTN, hyperlipidemia, CAD, who presents from a nursing home with right hip pain. She doesn't provide any history. She had multiple falls yesterday and was noted to have a fever and a cough. X-rays were taken there and reportedly showed a right hip fracture. She underwent open reduction and plate fixation. Postoperatively she developed acute metabolic encephalopathy and also hypothermia. Assessment / Plan: 
 
Sepsis 5/10 with hypothermia 94.5, rising WBC count 15.5, normal lactate 1.6 ? HCAP 5/10/2020 Abnormal LFTs with increased alk phos, AST, ALT, GGT Cholelithiasis, ? Choledocholithiasis - Increasing WBC count, cough, hypothermia 5/10 post op - CXR with right effusion, ? Underlying ASD, treated for HCAP with zosyn - Blood and urine cultures negative so far - UA 0-4 WBC, 2+ bacteria 
- since 5/10 worsening LFTs, Alk phos 662, AST 63,  
- US liver with mild intrahepatic ductal dilatation, gallstones CBD 0.4, No wall thickening,dilation, or pericholecystic fluid. 
-  suggests cholestasis 
- raises concern that biliary system may have been cause of the sepsis 
- day 3 zosyn, change to cefepime and flagyl - Differential of the abnormal LFTs Sepsis hepatopathy ? Cholestasis/eevated alk phos due to zosyn ? Choledocholithiasis ? Statin hold lipitor for now - D/W Dr Simmons Dopp, they will see in Am 
- try to get MRCP to image ducts better, not sure pt will tolerate D/W daughters, will give ativan(risks/benefits discussed) - check hepatitis panel - If not able to get MRCP, consider CT abdomen - IF has stone in duct, d/w daughters, will need to consider ERCP, ? Cholecystitis - Serial labs Acute metabolic encephalopathy POA Dementia with delirium - Cont measures for prevention of delirium. Open blinds in a.m. - Cont as needed Haldol for agitation.   
- Start Seroquel for now due to continued agitation - Remove George to minimize delirium 
- spoke with daughters by phone, baseline lives at [facility] 
- confused but talkative, daughters note she is not talking as much in hospital  
        baseline; knows herself and place, able to tell daughter's nam 
 
Right femoral intertrochanteric fracture s/p ORIF 
- increasing falls at LTC at [place] 
-post op care per ortho. 
-On asa for dvt px and now off Plavix per cards recommendations. Per nursing, she is able to take p.o. aspirin Type 2 DM uncontrolled with hypoglycemia 
- A1C 9.0 
-Decrease Lantus to 5 units daily. Hold lispro. Continue sliding scale. - BS yesterday 134, 191, 260, 395 Today 133, 210, 187 
- may need to add back premeal insulin Acute diastolic CHF POA Essential HTN POA Hyperlipidemia POA Persistent A-fib CAD 
-Continue Norvasc, metoprolol 
- hold lipitor with abnormal LFTs 
- BNP 4772 and cxr shows edema.   
- Echo shows EF of 65 to 70% with mild mitral valve nonspecific thickening. 
- Continue lasix 
- start albuterol nebs due to congestion and wheezing Lactic acidosis POA Lactate 4.2 resolved with IVF COVID 19 rule  out  
- SARS-CoV-2 negative x 3. Chest x-ray shows bilateral infiltrates. - Patient is on 2 L nasal cannula. - will need negative SARS-CoV-2 Urinary retention 
-Continue bladder scan with straight cath. - S/P George. Enterococcus UTI POA 
-Urine cx growing Enterococcus s/p Ampicillin Hx of CVA 
-Cont asa Overweight POA Body mass index is 26.63 kg/m². Code status: DNR Prophylaxis: SCD's/ Shakira Castillo Recommended Disposition: SNF, pts daughters do not want her go back to [place] Subjective: Chief Complaint / Reason for Physician Visit f/u Opens eyes, not talking 
 no complaints per NS, no vomiting Not able to provide history or ROS 
LFTs worsening Spoke with daughters about sepsis, abnormal LFTs, gallstones They do not want patient to go back to [facility] Objective: VITALS:  
Last 24hrs VS reviewed since prior progress note. Most recent are: 
Patient Vitals for the past 24 hrs: 
 Temp Pulse Resp BP SpO2  
05/14/20 1713  (!) 56     
05/14/20 1510 97.1 °F (36.2 °C) (!) 56 16 155/70 95 % 05/14/20 1200 97.5 °F (36.4 °C) (!) 57 18 134/70 98 % 05/14/20 1158 97.5 °F (36.4 °C) (!) 47 18 130/62 94 % 05/14/20 1059 97.5 °F (36.4 °C)      
05/14/20 0943 96.9 °F (36.1 °C)      
05/14/20 0848     93 % 05/14/20 0845     (!) 89 % 05/14/20 0834     92 % 05/14/20 0741 96.5 °F (35.8 °C) 60 18 159/65 99 % 05/14/20 0245 97.5 °F (36.4 °C) 89 18 156/61 100 % 05/13/20 2310 97.4 °F (36.3 °C) (!) 55 18 166/75 93 % 05/13/20 1943 97.5 °F (36.4 °C) (!) 55 18 140/90 96 % Intake/Output Summary (Last 24 hours) at 5/14/2020 1815 Last data filed at 5/14/2020 1637 Gross per 24 hour Intake 870 ml Output 1275 ml Net -405 ml PHYSICAL EXAM: 
General: Alert and awake, not verbal, not verbal 
EENT:   Anicteric sclerae. MMM Resp:  Rhonchi bilaterally, no wheezing or rales. No accessory muscle use CV:             irregular  rhythm,  No edema GI:  Soft, Non distended, No clear tenderness. +Bowel sounds Neurologic:  Alert and awake, not verbal or consistently following commands Psych:   confused Skin:  Incision is clean and dry Reviewed most current lab test results and cultures  YES Reviewed most current radiology test results   YES Review and summation of old records today    NO Reviewed patient's current orders and MAR    YES 
PMH/SH reviewed - no change compared to H&P 
________________________________________________________________________ Care Plan discussed with: 
  Comments Patient y Family  y Daughters by phone RN y   
Care Manager Consultant  y Dr King Multidiciplinary team rounds were held today with , nursing, pharmacist and clinical coordinator. Patient's plan of care was discussed; medications were reviewed and discharge planning was addressed. ________________________________________________________________________ Ian Anderson MD  
 
Procedures: see electronic medical records for all procedures/Xrays and details which were not copied into this note but were reviewed prior to creation of Plan. LABS: 
I reviewed today's most current labs and imaging studies. Pertinent labs include: 
Recent Labs 05/14/20 
3869 05/13/20 
0118 05/12/20 
6063 WBC 10.5 11.9* 15.3* HGB 8.8* 7.7* 8.8* HCT 27.2* 24.7* 27.0*  
* 699* 730* Recent Labs 05/14/20 
3310 05/13/20 
0118 05/12/20 
3385  146* 145  
K 4.2 3.6 4.0  
* 115* 114* CO2 25 26 25 * 52* 100 BUN 27* 28* 22* CREA 0.82 0.75 0.76 CA 8.8 8.8 8.7 ALB 2.7* 2.7*  --   
TBILI 0.5 0.5  --   
SGOT 63* 71*  --   
* 111*  --   
 
 
Signed: )Ian Anderson MD

## 2020-05-14 NOTE — PROGRESS NOTES
Ortho / Neurosurgery NP Note POD# 10  s/p RIGHT FEMUR INSERTION INTRA MEDULLARY NAIL On isolation again with third COVID test negative (5/12) - COVID test negative x 2 previously Pt resting in bed. Hx Dementia. Awake, follows simple commands. Roll belt and bed alarm in place. Hypothermic - bear hugger in place Remote tele - currently NSR. Yanick Real earlier in upper HR 38-40s HTN - prn hydralazine last received 5/13 Visit Vitals /65 (BP 1 Location: Left arm) Pulse 60 Temp 97.5 °F (36.4 °C) Resp 18 Ht 5' 3\" (1.6 m) Wt 68.2 kg (150 lb 5.7 oz) SpO2 92% BMI 26.63 kg/m² Voiding status: George reinserted for retention Output (mL) Urine Voided: 200 ml (05/07/20 2125) Last Bowel Movement Date: 05/10/20 (05/13/20 1943) Unmeasurable Output Urine Occurrence(s): 1 (05/07/20 0845) Stool Occurrence(s): 1 (05/10/20 0207) Straight Cath Straight Cath: Nurse performed cath (05/07/20 0534) Number of Attempts: 1 (05/07/20 0534) Catheter Size: 16 FR (05/07/20 0534) Time Catheter Inserted: 0087 (05/07/20 0534) Time Catheter Removed: 0530 (05/07/20 0534) Urine: 350 mL (05/07/20 0534) Labs Lab Results Component Value Date/Time HGB 8.8 (L) 05/14/2020 05:53 AM  
  
Lab Results Component Value Date/Time INR 1.1 05/03/2020 02:27 AM  
  
Lab Results Component Value Date/Time Sodium 142 05/14/2020 05:53 AM  
 Potassium 4.2 05/14/2020 05:53 AM  
 Chloride 113 (H) 05/14/2020 05:53 AM  
 CO2 25 05/14/2020 05:53 AM  
 Glucose 121 (H) 05/14/2020 05:53 AM  
 BUN 27 (H) 05/14/2020 05:53 AM  
 Creatinine 0.82 05/14/2020 05:53 AM  
 Calcium 8.8 05/14/2020 05:53 AM  
 
Recent Glucose Results:  
Lab Results Component Value Date/Time  (H) 05/14/2020 05:53 AM  
 GLUCPOC 133 (H) 05/14/2020 07:46 AM  
 GLUCPOC 363 (H) 05/13/2020 09:28 PM  
 GLUCPOC 395 (H) 05/13/2020 09:25 PM  
 
CXR (5/10) - Unchanged cardiomegaly and edema. Unchanged mild right pleural effusion.  New 
 mild left pleural effusion. Body mass index is 26.63 kg/m². : A BMI > 30 is classified as obesity and > 40 is classified as morbid obesity. A&O to self only Anterior and posterior left upper lung rhonci - nursing paged RT for prn albuterol Right clear, diminished in bases bilaterally Optifoam dressing is c/d/i Calves soft and supple; No pain with passive stretch Assessment limited due to cognition; moving all extremities. Abd soft, +BS, LBM 5/10 SCDs for mechanical DVT proph while in bed PLAN: 
1) PT BID, OT - WBAT 2) Aspirin 325 mg PO BID for DVT Prophylaxis 3) GI Prophylaxis - Protonix 4) Dementia - fall prevention/safety, bed alarm, roll belt. Haldol PRN. 5) Urinary Retention - UTI POA, ampillicin course completed. George reinserted on 5/8 for retention, pulled out by patient and reinserted on 5/12. 6) Leukocytosis - WBC 10.5 (5/14). Blood and urine cultures sent 5/10, so far negative. CXR massiel pleural effusions. Zosyn for PNA per IM. No other active signs of infection. 7) Readniess for discharge: 
   [x] Vital Signs stable  
 [x] Hgb stable  
 [x] George for retention  
 [x] Wound intact, drainage minimal  
 [x] Diet - Speech following \"Continue Puree diet/ thin liquids. Single straw sips ok STRICT upright PO with any PO!! Feeding asssit - CHECK FOR POCKETING 
     CRUSH MEDS\" [x] Adequate pain control on oral medication alone Plans to return to [address] [place] [address] once medically stable. Stable for discharge from ortho standpoint.   
  
 
Nadine Cole NP

## 2020-05-15 NOTE — PROGRESS NOTES
Hospitalist Progress Note NAME: Mallory Griffin :  1950 MRN:  [id number] Interim Hospital Summary: 69 y.o. female whom presented on 2020 with dementia, DM, HTN, hyperlipidemia, CAD, who presents from a nursing home with right hip pain. She doesn't provide any history. She had multiple falls yesterday and was noted to have a fever and a cough. X-rays were taken there and reportedly showed a right hip fracture. She underwent open reduction and plate fixation. Postoperatively she developed acute metabolic encephalopathy and also hypothermia. Assessment / Plan: 
 
Sepsis 5/10 with hypothermia 94.5, rising WBC count 15.5, normal lactate 1.6 ? HCAP 5/10/2020 Abnormal LFTs with increased alk phos, AST, ALT, GGT Cholelithiasis, ? Choledocholithiasis - Increasing WBC count, cough, hypothermia 5/10 post op - CXR with right effusion, ? Underlying ASD, treated for HCAP with zosyn - Blood and urine cultures negative so far - UA 0-4 WBC, 2+ bacteria 
- since 5/10 worsening LFTs, Alk phos 662, AST 63,  
- US liver with mild intrahepatic ductal dilatation, gallstones CBD 0.4, No wall thickening,dilation, or pericholecystic fluid. 
-  suggests cholestasis 
- raises concern that biliary system may have been cause of the sepsis 
- day 3 zosyn, change to cefepime and flagyl - Differential of the abnormal LFTs Sepsis hepatopathy ? Cholestasis/eevated alk phos due to zosyn ? Choledocholithiasis ? Statin hold lipitor for now - D/W Dr Eyad Boyer, they will see in Am 
- try to get MRCP to image ducts better, not sure pt will tolerate D/W daughters, will give ativan(risks/benefits discussed) - check hepatitis panel 
- for MRCP 
- IF has stone in duct, d/w daughters, will need to consider ERCP, ? Cholecystitis - Serial labs Acute metabolic encephalopathy POA Dementia with delirium - Cont measures for prevention of delirium. Open blinds in a.m. - Cont as needed Haldol for agitation.   
- Start Seroquel for now due to continued agitation - Remove George to minimize delirium 
- spoke with daughters by phone, baseline lives at [place] 
- confused but talkative, daughters note she is not talking as much in hospital  
        baseline; knows herself and place, able to tell daughter's nam 
 
Right femoral intertrochanteric fracture s/p ORIF 
- increasing falls at LTC at [facility] 
-post op care per ortho. 
-On asa for dvt px and now off Plavix per cards recommendations. Per nursing, she is able to take p.o. aspirin Type 2 DM uncontrolled with hypoglycemia 
- A1C 9.0 
-Decrease Lantus to 5 units daily. Hold lispro. Continue sliding scale. - BS yesterday 134, 191, 260, 395 Today 133, 210, 187 
- may need to add back premeal insulin Acute diastolic CHF POA Essential HTN POA Hyperlipidemia POA Persistent A-fib CAD 
-Continue Norvasc, metoprolol 
- hold lipitor with abnormal LFTs 
- BNP 4772 and cxr shows edema.   
- Echo shows EF of 65 to 70% with mild mitral valve nonspecific thickening. 
- Continue lasix 
- start albuterol nebs due to congestion and wheezing Lactic acidosis POA Lactate 4.2 resolved with IVF COVID 19 rule  out  
- SARS-CoV-2 negative x 3. Chest x-ray shows bilateral infiltrates. - Patient is on 2 L nasal cannula. - will need negative SARS-CoV-2 Urinary retention 
-Continue bladder scan with straight cath. - S/P George. Enterococcus UTI POA 
-Urine cx growing Enterococcus s/p Ampicillin Hx of CVA 
-Cont asa Overweight POA Body mass index is 26.63 kg/m². Code status: DNR Prophylaxis: SCD's/ [place] Caller Recommended Disposition: SNF, pts daughters do not want her go back to [facility] Subjective: Chief Complaint / Reason for Physician Visit f/u She wants to eat 
 no complaints per NS, no vomiting Not able to provide history or ROS Objective: VITALS:  
Last 24hrs VS reviewed since prior progress note. Most recent are: 
Patient Vitals for the past 24 hrs: 
 Temp Pulse Resp BP SpO2  
05/15/20 1104 97.8 °F (36.6 °C) 71 18 141/67 100 % 05/15/20 0749 97.7 °F (36.5 °C) 84 18 170/75 99 % 05/14/20 2352 97.9 °F (36.6 °C) (!) 59 18 135/72 97 % 05/14/20 2025 97.7 °F (36.5 °C) (!) 58 20 153/77 98 % 05/14/20 1713  (!) 56     
05/14/20 1510 97.1 °F (36.2 °C) (!) 56 16 155/70 95 % Intake/Output Summary (Last 24 hours) at 5/15/2020 1351 Last data filed at 5/15/2020 1032 Gross per 24 hour Intake 390 ml Output 2025 ml Net -1635 ml PHYSICAL EXAM: 
General: Alert and awake, not verbal, not verbal 
EENT:   Anicteric sclerae. MMM Resp:  Rhonchi bilaterally, no wheezing or rales. No accessory muscle use CV:             irregular  rhythm,  No edema GI:  Soft, Non distended, No clear tenderness. +Bowel sounds Neurologic:  Alert and awake, not verbal or consistently following commands Psych:   confused Skin:  Incision is clean and dry Reviewed most current lab test results and cultures  YES Reviewed most current radiology test results   YES Review and summation of old records today    NO Reviewed patient's current orders and MAR    YES 
PMH/ reviewed - no change compared to H&P 
________________________________________________________________________ Care Plan discussed with: 
  Comments Patient y Family RN y   
Care Manager Consultant  y Dr Ilir Hartmann Multidiciplinary team rounds were held today with , nursing, pharmacist and clinical coordinator. Patient's plan of care was discussed; medications were reviewed and discharge planning was addressed. ________________________________________________________________________ John Bender MD  
 
Procedures: see electronic medical records for all procedures/Xrays and details which were not copied into this note but were reviewed prior to creation of Plan. LABS: 
I reviewed today's most current labs and imaging studies. Pertinent labs include: 
Recent Labs 05/15/20 
5296 05/14/20 
9230 05/13/20 
0118 WBC 12.1* 10.5 11.9* HGB 9.2* 8.8* 7.7* HCT 28.9* 27.2* 24.7*  
* 739* 699* Recent Labs 05/15/20 
5611 05/14/20 
9241 05/13/20 
0118  142 146*  
K 3.7 4.2 3.6 * 113* 115* CO2 25 25 26 * 121* 52*  
BUN 25* 27* 28* CREA 0.93 0.82 0.75 CA 8.9 8.8 8.8 ALB 2.8* 2.7* 2.7* TBILI 0.7 0.5 0.5 SGOT 38* 63* 71* * 131* 111* Signed: )Kristin Pearson MD

## 2020-05-15 NOTE — ROUTINE PROCESS
Patient returned from MRI. Attempted to page GI to resume diet but no answer. Reaching out to hospitalist about patient's diet.

## 2020-05-15 NOTE — CONSULTS
Gastroenterology Consult Referring Physician: Dr. Derrek Ralph Consult Date: 5/15/2020 Subjective: Chief Complaint: elevated LFT's 
 
History of Present Illness: Savannah Wiseman is a 69 y.o. female with dementia, DM, HTN, hyperlipidemia, CAD who is seen in consultation for elevated LFTs. Admitted 5/2/2020 from a nursing home with right hip pain. She doesn't provide any history. She had multiple falls  and was noted to have a fever and a cough. X-rays were taken there and reportedly showed a right hip fracture. She underwent open reduction and plate fixation. Jeaneen Kussmaul was ruled out. Postoperatively she developed acute metabolic encephalopathy and also hypothermia and has been treated for sepsis with Zosyn for possible HCAP. Since 5/10 worsening LFTs, Alk phos , AST 63, ALT 1, normal Bili. . US liver with mild intrahepatic ductal dilatation, gallstones, CBD 0.4, No wall thickening,dilation, or pericholecystic fluid. Subtle findings suggesting acute hepatitis: \"Starry fritz\" appearance of the liver, mild intrahepatic biliary ductal dilation, trace perihepatic ascites, and right pleural effusion. Zosyn was changed to Flagyl and Cefepime and MRCP has been ordered. Patient seems to be tolerating a regular diet and denies abd pain, nausea, vomiting, itching. She is afebrile. WBC 12.1. She denies FH of liver disease and gallbladder disease. Acute hepatitis panel is negative. Lactic acid is normal.  She has been receiving acetaminophen 650 mg every 6 hrs. BNP 4772 and cxr shows edema.   
- Echo shows EF of 65 to 70% with mild mitral valve nonspecific thickening Past Medical History:  
Diagnosis Date  CAD (coronary artery disease)  Coronary artery disease involving native coronary artery 5/4/2020 5/2019 at [facility] cardiac cath with  to mid-distal LAD, med management  Dementia ([facility] [address]  Diabetes ([facility] [address]  Diabetes mellitus type 2, controlled ([facility] [address] 5/4/2020  Hyperlipemia  Hypertension  Hypertension 5/4/2020 History reviewed. No pertinent surgical history. History reviewed. No pertinent family history. Social History Tobacco Use  Smoking status: Unknown If Ever Smoked  Smokeless tobacco: Never Used Substance Use Topics  Alcohol use: Not Currently No Known Allergies Current Facility-Administered Medications Medication Dose Route Frequency  cefepime (MAXIPIME) 1 g in 0.9% sodium chloride (MBP/ADV) 50 mL  1 g IntraVENous Q8H  
 metroNIDAZOLE (FLAGYL) IVPB premix 500 mg  500 mg IntraVENous Q12H  
 heparin (porcine) injection 5,000 Units  5,000 Units SubCUTAneous Q8H  
 LORazepam (ATIVAN) injection 1 mg  1 mg IntraVENous ON CALL  pantoprazole (PROTONIX) tablet 40 mg  40 mg Oral DAILY  albuterol (PROVENTIL VENTOLIN) nebulizer solution 2.5 mg  2.5 mg Nebulization Q6H PRN  
 furosemide (LASIX) tablet 40 mg  40 mg Oral DAILY  insulin glargine (LANTUS) injection 5 Units  5 Units SubCUTAneous QHS  potassium chloride SR (KLOR-CON 10) tablet 20 mEq  20 mEq Oral DAILY  QUEtiapine (SEROquel) tablet 25 mg  25 mg Oral QHS  amLODIPine (NORVASC) tablet 10 mg  10 mg Oral DAILY  hydrALAZINE (APRESOLINE) 20 mg/mL injection 10 mg  10 mg IntraVENous Q6H PRN  
 haloperidol lactate (HALDOL) injection 2 mg  2 mg IntraMUSCular Q6H PRN  
 sodium chloride (NS) flush 5-40 mL  5-40 mL IntraVENous Q8H  
 sodium chloride (NS) flush 5-40 mL  5-40 mL IntraVENous PRN  
 oxyCODONE IR (ROXICODONE) tablet 2.5 mg  2.5 mg Oral Q4H PRN  
 oxyCODONE IR (ROXICODONE) tablet 5 mg  5 mg Oral Q4H PRN  
 calcium-vitamin D (OS-JERE) 500 mg-200 unit tablet  1 Tab Oral TID WITH MEALS  polyethylene glycol (MIRALAX) packet 17 g  17 g Oral DAILY  bisacodyL (DULCOLAX) suppository 10 mg  10 mg Rectal DAILY PRN  
 aspirin delayed-release tablet 325 mg  325 mg Oral BID  
  ondansetron (ZOFRAN) injection 4 mg  4 mg IntraVENous Q4H PRN  
 sodium chloride (NS) flush 5-40 mL  5-40 mL IntraVENous Q8H  
 naloxone (NARCAN) injection 0.4 mg  0.4 mg IntraVENous PRN  
 senna-docusate (PERICOLACE) 8.6-50 mg per tablet 2 Tab  2 Tab Oral BID  labetaloL (NORMODYNE;TRANDATE) injection 20 mg  20 mg IntraVENous Q6H PRN  
 metoprolol tartrate (LOPRESSOR) tablet 75 mg  75 mg Oral BID  
 glucose chewable tablet 16 g  4 Tab Oral PRN  
 dextrose (D50W) injection syrg 12.5-25 g  25-50 mL IntraVENous PRN  
 glucagon (GLUCAGEN) injection 1 mg  1 mg IntraMUSCular PRN  
 insulin lispro (HUMALOG) injection   SubCUTAneous AC&HS  acetaminophen (TYLENOL) tablet 650 mg  650 mg Oral Q6H PRN Or  
 acetaminophen (TYLENOL) suppository 650 mg  650 mg Rectal Q6H PRN Review of Systems: 
Unable to obtain due to mental status. Objective:  
 
Physical Exam: 
Visit Vitals /75 (BP 1 Location: Left arm, BP Patient Position: At rest) Pulse 84 Temp 97.7 °F (36.5 °C) Resp 18 Ht 5' 3\" (1.6 m) Wt 68.2 kg (150 lb 5.7 oz) SpO2 99% BMI 26.63 kg/m² Gen: elderly black female in NAD Skin:  Extremities and face reveal no rashes. No cannon erythema. No telangiectasias on the chest wall. HEENT: Sclerae anicteric. Cardiovascular: Regular rate and rhythm. + murmurs. Respiratory:  Comfortable breathing with no accessory muscle use. course breath sounds with no wheezes, rales, or rhonchi. GI:  Abdomen nondistended, soft, and nontender. Normal active bowel sounds. No enlargement of the liver or spleen. No masses palpable. Rectal:  Deferred Musculoskeletal:  No pitting edema of the lower legs. Neurological:  Patient can tell me her name and that she is in a hospital.  She does not know the year and states that Sharonda Gonzales is the president. Psychiatric:  Mood appears appropriate. Lymphatic:  No cervical or supraclavicular adenopathy. Lab/Data Review: 
Lab Results Component Value Date/Time WBC 12.1 (H) 05/15/2020 04:18 AM  
 HGB 9.2 (L) 05/15/2020 04:18 AM  
 HCT 28.9 (L) 05/15/2020 04:18 AM  
 PLATELET 429 (H) 40/19/3539 04:18 AM  
 MCV 92.0 05/15/2020 04:18 AM  
 
Lab Results Component Value Date/Time Sodium 141 05/15/2020 04:18 AM  
 Potassium 3.7 05/15/2020 04:18 AM  
 Chloride 110 (H) 05/15/2020 04:18 AM  
 CO2 25 05/15/2020 04:18 AM  
 Anion gap 6 05/15/2020 04:18 AM  
 Glucose 200 (H) 05/15/2020 04:18 AM  
 BUN 25 (H) 05/15/2020 04:18 AM  
 Creatinine 0.93 05/15/2020 04:18 AM  
 BUN/Creatinine ratio 27 (H) 05/15/2020 04:18 AM  
 GFR est AA >60 05/15/2020 04:18 AM  
 GFR est non-AA 59 (L) 05/15/2020 04:18 AM  
 Calcium 8.9 05/15/2020 04:18 AM  
 Bilirubin, total 0.7 05/15/2020 04:18 AM  
 AST (SGOT) 38 (H) 05/15/2020 04:18 AM  
 Alk. phosphatase 569 (H) 05/15/2020 04:18 AM  
 Protein, total 6.7 05/15/2020 04:18 AM  
 Albumin 2.8 (L) 05/15/2020 04:18 AM  
 Globulin 3.9 05/15/2020 04:18 AM  
 A-G Ratio 0.7 (L) 05/15/2020 04:18 AM  
 ALT (SGPT) 104 (H) 05/15/2020 04:18 AM  
 
US Results (most recent): 
Results from Hospital Encounter encounter on 05/02/20 US ABD LTD Narrative ULTRASOUND OF THE RIGHT UPPER QUADRANT INDICATION: Sepsis, abnormal LFTs 
 
COMPARISON: CT chest 5/2/2020. TECHNIQUE: 
Sonography of the right upper quadrant was performed. FINDINGS: 
 
LIVER: The peripheral bile ducts are subtly echogenic Zoanne Poag fritz\"), a 
nonspecific finding that can be seen in the setting of acute hepatitis or 
intrahepatic biliary ductal dilation. There is trace perihepatic ascites and a 
right pleural effusion. Mild intrahepatic biliary ductal dilation is better seen 
on cine images of the right kidney. No focal hepatic mass is shown. GALLBLADDER: Sludge and small stones layer dependently. No wall thickening, 
dilation, or pericholecystic fluid. COMMON DUCT: 0.4 cm in diameter. The duct is normal caliber. PANCREAS: The visualized portions of the pancreas are normal.  
RIGHT KIDNEY: 10.2 cm in length. No hydronephrosis, shadowing calculus, or 
contour-deforming renal mass. Oval, circumscribed, right adrenal nodules were fluid dense on CT, consistent 
with lipid rich adenomas. Impression IMPRESSION:  
1. Subtle findings suggesting acute hepatitis: \"Starry fritz\" appearance of the 
liver, mild intrahepatic biliary ductal dilation, trace perihepatic ascites, and 
right pleural effusion. 2. Right adrenal nodules are lipid rich adenomas (benign). 3. Sludge and small gallstones. No acute cholecystitis. Assessment/Plan:  
 
Principal Problem: 
  Closed right hip fracture ([facility] [address] (5/3/2020) Active Problems: 
  Coronary artery disease involving native coronary artery (5/4/2020) Overview: 5/2019 at [facility] cardiac cath with  to mid-distal  
    LAD, med management Diabetes mellitus type 2, controlled ([address] (5/4/2020) Hypertension (5/4/2020) Dementia ([facility] [address] () Hyperlipemia () Agree with MRCP. I will make patient NPO. As she ate breakfast, this will likely be delayed until this afternoon. She seems well without pain or tenderness though, not consistent with biliary sepsis. Continue Flagyl and Cefepime empirically as we await further test results. Zosyn can cause increased serum alkaline phosphatase and transaminases. I also stopped her scheduled Tylenol. I ordered liver serologies to include viruses such as HSV, CMV and EBV.    
 
LA Pardo 
05/15/20 
8:38 AM

## 2020-05-15 NOTE — PROGRESS NOTES
Ortho / Neurosurgery NP Note POD# 11  s/p RIGHT FEMUR INSERTION INTRA MEDULLARY NAIL Three COVID test negative including last (5/12) - negative Pt resting in bed. Asleep, arouses easily. Only complaint is that she feels cold. Denies pain. Hx Dementia. Awake, follows simple commands. Roll belt and bed alarm in place. Pt states she feels cold. Remote tele - currently NSR. Megan Ohm earlier in upper HR 38-40s HTN - prn hydralazine last received 5/13 Pulse Ox 99% wearing NC 2LPM 
 
Visit Vitals /75 (BP 1 Location: Left arm, BP Patient Position: At rest) Pulse 84 Temp 97.7 °F (36.5 °C) Resp 18 Ht 5' 3\" (1.6 m) Wt 68.2 kg (150 lb 5.7 oz) SpO2 99% BMI 26.63 kg/m² Voiding status: George reinserted for retention Output (mL) Urine Voided: 200 ml (05/07/20 2125) Last Bowel Movement Date: 05/10/20 (05/14/20 2010) Unmeasurable Output Urine Occurrence(s): 1 (05/07/20 0845) Stool Occurrence(s): 1 (05/10/20 0207) Straight Cath Straight Cath: Nurse performed cath (05/07/20 0534) Number of Attempts: 1 (05/07/20 0534) Catheter Size: 16 FR (05/07/20 0534) Time Catheter Inserted: 0058 (05/07/20 0534) Time Catheter Removed: 0530 (05/07/20 0534) Urine: 350 mL (05/07/20 0534) Labs Lab Results Component Value Date/Time HGB 9.2 (L) 05/15/2020 04:18 AM  
  
Lab Results Component Value Date/Time INR 1.1 05/03/2020 02:27 AM  
  
Lab Results Component Value Date/Time Sodium 141 05/15/2020 04:18 AM  
 Potassium 3.7 05/15/2020 04:18 AM  
 Chloride 110 (H) 05/15/2020 04:18 AM  
 CO2 25 05/15/2020 04:18 AM  
 Glucose 200 (H) 05/15/2020 04:18 AM  
 BUN 25 (H) 05/15/2020 04:18 AM  
 Creatinine 0.93 05/15/2020 04:18 AM  
 Calcium 8.9 05/15/2020 04:18 AM  
 
Recent Glucose Results:  
Lab Results Component Value Date/Time   (H) 05/15/2020 04:18 AM  
 GLUCPOC 212 (H) 05/15/2020 07:19 AM  
 GLUCPOC 269 (H) 05/14/2020 10:25 PM  
 GLUCPOC 187 (H) 05/14/2020 04:03 PM  
 
 CXR (5/10) - Unchanged cardiomegaly and edema. Unchanged mild right pleural effusion. New 
mild left pleural effusion. Body mass index is 26.63 kg/m². : A BMI > 30 is classified as obesity and > 40 is classified as morbid obesity. A&O to self only Still with audible upper resp congestions, no apparent work or shortness of breath Optifoam dressing is c/d/i Calves soft and supple; No pain with passive stretch Assessment limited due to cognition; moving all extremities. Abd soft, +BS, LBM 5/10 SCDs for mechanical DVT proph while in bed PLAN: 
1) PT BID, OT - WBAT 2) Aspirin 325 mg PO BID for DVT Prophylaxis 3) GI Prophylaxis - Protonix 4) Dementia - fall prevention/safety, bed alarm, roll belt. Haldol PRN. 5) Urinary Retention - UTI POA, ampillicin course completed. George reinserted on 5/8 for retention, pulled out by patient and reinserted on 5/12. 6) Leukocytosis - WBC 10.5 (5/14). Blood and urine cultures sent 5/10, so far negative. CXR massiel pleural effusions. Zosyn for PNA per IM. No other active signs of infection. 7) Readniess for discharge: 
   [x] Vital Signs stable  
 [x] Hgb stable  
 [x] George for retention  
 [x] Wound intact, drainage minimal  
 [x] Diet - Speech following \"Continue Puree diet/ thin liquids. Single straw sips ok STRICT upright PO with any PO!! Feeding asssit - CHECK FOR POCKETING 
     CRUSH MEDS\" [x] Adequate pain control on oral medication alone Plans to return to [address] [place] [address] once medically stable. Stable for discharge from ortho standpoint.   
  
 
Elisha Mast NP

## 2020-05-15 NOTE — PROGRESS NOTES
Bedside and Verbal shift change report given to Hi Corral (oncoming nurse) by Simone Meraz (offgoing nurse). Report included the following information SBAR, Kardex, Intake/Output, MAR and Recent Results.

## 2020-05-16 NOTE — PROGRESS NOTES
Orthopedic End of Shift Note Bedside shift change report given to Tonja Bacon RN (oncoming nurse) by Darwin Osorio (offgoing nurse). Report included the following information SBAR, Kardex, OR Summary, Procedure Summary, Intake/Output, MAR, Accordion, Recent Results and Cardiac Rhythm afib. POD# Significant issues during shift: none Issues for Physician to address: none Activity This Shift 
(check all that apply) [] chair 
[] dangle 
 [] bathroom 
[] bedside commode [] hallway [x] bedrest  
Nausea/Vomiting [] yes [x] no    
Voiding Status [] void [x] George [] I&O Cath Bowel Movements [x] yes [] no Foot Pumps or SCD [] yes [x] no Ice Pack [] yes    [x] no Incentive Spirometer [] yes [] no Volume:     
Telemetry Monitoring   [x] yes [] no Rhythm:afib Supplemental O2 [] yes [x] no Sat off O2:   97%

## 2020-05-16 NOTE — PROGRESS NOTES
Orthopedic NP Progress Note Post Op day: 12 Days Post-Op May 16, 2020 10:30 AM  
 
Cameron Davila Attending Physician: Treatment Team: Attending Provider: Aubree Gallegos MD; Consulting Provider: [place] Paiz NP; Consulting Provider: Hermes Moreno MD; Hospitalist: Frieda Nguyen NP; Consulting Provider: Torres Pat MD; Utilization Review: Roberto Anaya RN; Care Manager: Joyce Lagos; Utilization Review: Tunde Abdullahi Consulting Provider: Edgar Rodriguez MD; Consulting Provider: Mary Loredo MD  
 
Vital Signs:   
Patient Vitals for the past 8 hrs: 
 BP Temp Pulse Resp SpO2  
05/16/20 0755 185/72 97.8 °F (36.6 °C) 65 16 100 % 05/16/20 0404 147/66 98.1 °F (36.7 °C) 60 16 100 % BMI (calculated): 26.5 (05/15/20 1616) Intake/Output: 
No intake/output data recorded. 05/14 1901 - 05/16 0700 In: 270 [P.O.:120; I.V.:150] Out: 2300 [[id number]] Pain Control:  
Pain Assessment Pain Scale 1: Numeric (0 - 10) Pain Intensity 1: 0 Pain Onset 1: post op Pain Location 1: Hip Pain Orientation 1: Right Pain Description 1: Aching Pain Intervention(s) 1: Ice 
 
LAB:   
Recent Labs 05/15/20 
7884 HCT 28.9* HGB 9.2* Lab Results Component Value Date/Time Sodium 141 05/15/2020 04:18 AM  
 Potassium 3.7 05/15/2020 04:18 AM  
 Chloride 110 (H) 05/15/2020 04:18 AM  
 CO2 25 05/15/2020 04:18 AM  
 Glucose 200 (H) 05/15/2020 04:18 AM  
 BUN 25 (H) 05/15/2020 04:18 AM  
 Creatinine 0.93 05/15/2020 04:18 AM  
 Calcium 8.9 05/15/2020 04:18 AM  
 
 
Subjective: Cameron Davila is a 69 y.o. female s/p a  Procedure(s): RIGHT FEMUR INSERTION INTRA MEDULLARY NAIL Procedure(s): RIGHT FEMUR INSERTION INTRA MEDULLARY NAIL. Tolerating diet. Pt resting in bed Objective: General:  cooperative, no distress. Neuro/Vascular: CNS Intact. Sensation stable. Brisk cap refill, 2+ pulses UE/LE Musculoskeletal:  FROM UE/LE. Skin: Incision - clean, dry and intact. No significant erythema or swelling. Dressing: clean, dry, and intact George -y Drain - n 
  
 
PT/OT:  
Gait:  Gait Base of Support: (deferred -  sidestepped only 2 feet) Speed/Malika: Pace decreased (<100 feet/min), Shuffled, Slow Step Length: Left shortened, Right shortened Stance: Right decreased Gait Abnormalities: Antalgic, Decreased step clearance, Step to gait Ambulation - Level of Assistance: Moderate assistance, Assist x2 Distance (ft): 6 Feet (ft) Assistive Device: Gait belt, Walker, rolling Assessment:  
 s/p Procedure(s): RIGHT FEMUR INSERTION INTRA MEDULLARY NAIL Principal Problem: 
  Closed right hip fracture ([facility] [address] (5/3/2020) Active Problems: 
  Coronary artery disease involving native coronary artery (5/4/2020) Overview: 5/2019 at [facility] cardiac cath with  to mid-distal  
    LAD, med management Diabetes mellitus type 2, controlled ([facility] [address] (5/4/2020) Hypertension (5/4/2020) Dementia ([facility] [address] () Hyperlipemia () Plan:  
-  Continue PT/OT - WBAT  
-  Continue established methods of pain control 
-  VTE Prophylaxes - TEDS &/or SCDs with ASA BID 
-  GI Prophylaxis - protonix  
-  UTI (POA) - ABX completed Discharge To:  Plan to return to [facility], cleared for DC from [facility] Signed By: Ayanna Baker NP Orthopedic Nurse Practitioner

## 2020-05-16 NOTE — PROGRESS NOTES
Bedside and Verbal shift change report given to DAQUAN Gonsales  (oncoming nurse) by Sonido Crowley  (offgoing nurse). Report included the following information SBAR, Kardex, Procedure Summary, Intake/Output, MAR and Accordion.

## 2020-05-16 NOTE — PROGRESS NOTES
Hospitalist Progress Note NAME: Eulogio Vanegas :  1950 MRN:  [id number] Interim Hospital Summary: 69 y.o. female whom presented on 2020 with dementia, DM, HTN, hyperlipidemia, CAD, who presents from a nursing home with right hip pain. She doesn't provide any history. She had multiple falls yesterday and was noted to have a fever and a cough. X-rays were taken there and reportedly showed a right hip fracture. She underwent open reduction and plate fixation. Postoperatively she developed acute metabolic encephalopathy and also hypothermia. Assessment / Plan: 
 
Sepsis 5/10 with hypothermia 94.5, rising WBC count 15.5, normal lactate 1.6 ? HCAP 5/10/2020 Abnormal LFTs with increased alk phos, AST, ALT, GGT Cholelithiasis, ? Choledocholithiasis - Increasing WBC count, cough, hypothermia 5/10 post op - CXR with right effusion, ? Underlying ASD, treated for HCAP with zosyn - Blood and urine cultures negative so far - UA 0-4 WBC, 2+ bacteria 
- since 5/10 worsening LFTs, Alk phos 662, AST 63,  
- US liver with mild intrahepatic ductal dilatation, gallstones CBD 0.4, No wall thickening,dilation, or pericholecystic fluid. 
-  suggests cholestasis 
- raises concern that biliary system may have been cause of the sepsis 
- day 3 zosyn, change to cefepime and flagyl - Differential of the abnormal LFTs Sepsis hepatopathy ? Cholestasis/eevated alk phos due to zosyn ? Choledocholithiasis ? Statin hold lipitor for now - D/W Dr Alejandra Lane, they will see in Am 
- try to get MRCP to image ducts better, not sure pt will tolerate D/W daughters, will give ativan(risks/benefits discussed) - check hepatitis panel - MRCP 05/15/2020 1. Study compromised by patient motion. 2. No biliary dilatation. 3. Right adrenal adenoma. - Serial labs Right adrenal adenoma. 
-follow as outpatient  
-incidental finding on MRCP 
 
 Acute metabolic encephalopathy POA,resolving Dementia with delirium 
- Cont measures for prevention of delirium. Open blinds in a.m. - Cont as needed Haldol for agitation.   
- Start Seroquel for now due to continued agitation - Remove George to minimize delirium 
- spoke with daughters by phone, baseline lives at [place] 
- confused but talkative, daughters note she is not talking as much in hospital  
        baseline; knows herself and place, able to tell daughter's nam 
 
Right femoral intertrochanteric fracture s/p ORIF 
- increasing falls at LTC at [place] 
-post op care per ortho. 
-On asa for dvt px and now off Plavix per cards recommendations. Per nursing, she is able to take p.o. aspirin Type 2 DM uncontrolled with hypoglycemia 
- A1C 9.0 
-Decrease Lantus to 5 units daily. Hold lispro. Continue sliding scale. - BS yesterday 134, 191, 260, 395 Today 133, 210, 187 
- may need to add back premeal insulin Acute diastolic CHF POA Essential HTN POA Hyperlipidemia POA Persistent A-fib CAD 
-Continue Norvasc, metoprolol 
- hold lipitor with abnormal LFTs 
- BNP 4772 and cxr shows edema.   
- Echo shows EF of 65 to 70% with mild mitral valve nonspecific thickening. 
- Continue lasix 
- start albuterol nebs due to congestion and wheezing Lactic acidosis POA Lactate 4.2 resolved with IVF COVID 19 rule  out  
- SARS-CoV-2 negative x 3. Chest x-ray shows bilateral infiltrates. - Patient is on 2 L nasal cannula. - will need negative SARS-CoV-2 Urinary retention 
-Continue bladder scan with straight cath. - S/P George. Enterococcus UTI POA 
-Urine cx growing Enterococcus s/p Ampicillin Hx of CVA 
-Cont asa Overweight POA Body mass index is 26.47 kg/m². Code status: DNR Prophylaxis: SCD's/ Cynthia Mcelroy Recommended Disposition: SNF, pts daughters do not want her go back to [facility] Subjective: Chief Complaint / Reason for Physician Visit f/u  
 \"I am so tired and sleepy\" no complaints per NS, no vomiting Not able to provide history or ROS Objective: VITALS:  
Last 24hrs VS reviewed since prior progress note. Most recent are: 
Patient Vitals for the past 24 hrs: 
 Temp Pulse Resp BP SpO2  
05/16/20 1137 97.6 °F (36.4 °C) (!) 58 18 162/72 95 % 05/16/20 0755 97.8 °F (36.6 °C) 65 16 185/72 100 % 05/16/20 0404 98.1 °F (36.7 °C) 60 16 147/66 100 % 05/15/20 2345 97.9 °F (36.6 °C) 60 16 163/72 92 % 05/15/20 2028 97.2 °F (36.2 °C) 60 16 137/69 95 % 05/15/20 1616 98 °F (36.7 °C) 81 16 170/66 94 % Intake/Output Summary (Last 24 hours) at 5/16/2020 1159 Last data filed at 5/16/2020 5094 Gross per 24 hour Intake 270 ml Output 1100 ml Net -830 ml PHYSICAL EXAM: 
General: Alert and awake, not verbal, not verbal 
EENT:   Anicteric sclerae. MMM Resp:  Rhonchi bilaterally, no wheezing or rales. No accessory muscle use CV:             irregular  rhythm,  No edema GI:  Soft, Non distended, No clear tenderness. +Bowel sounds Neurologic:  Alert and awake, not verbal or consistently following commands Psych:   confused Skin:  Incision is clean and dry Reviewed most current lab test results and cultures  YES Reviewed most current radiology test results   YES Review and summation of old records today    NO Reviewed patient's current orders and MAR    YES 
PMH/ reviewed - no change compared to H&P 
________________________________________________________________________ Care Plan discussed with: 
  Comments Patient y Family RN y   
Care Manager Consultant Multidiciplinary team rounds were held today with , nursing, pharmacist and clinical coordinator. Patient's plan of care was discussed; medications were reviewed and discharge planning was addressed. ________________________________________________________________________ Angela Patterson MD  
 
 Procedures: see electronic medical records for all procedures/Xrays and details which were not copied into this note but were reviewed prior to creation of Plan. LABS: 
I reviewed today's most current labs and imaging studies. Pertinent labs include: 
Recent Labs 05/15/20 
7511 05/14/20 
2880 WBC 12.1* 10.5 HGB 9.2* 8.8* HCT 28.9* 27.2*  
* 739* Recent Labs 05/15/20 
3013 05/14/20 
6618  142  
K 3.7 4.2 * 113* CO2 25 25 * 121* BUN 25* 27* CREA 0.93 0.82 CA 8.9 8.8 ALB 2.8* 2.7* TBILI 0.7 0.5 SGOT 38* 63* * 131* Signed: )Meño An MD

## 2020-05-16 NOTE — ROUTINE PROCESS
Bedside and Verbal shift change report given to Charissa Boyd RN (oncoming nurse) by Latanya Bone RN (offgoing nurse).  Report included the following information SBAR, Kardex, Intake/Output, MAR, Recent Results and Med Rec Status.

## 2020-05-16 NOTE — PROGRESS NOTES
GI PROGRESS NOTE 
 
NAME:             Irlanda Louis :              1950 MRN:              [id number] Admit Date:     2020 Todays Date:  2020 Subjective:  
     
  Denies abdominal pain. Medications-reviewed Current Facility-Administered Medications Medication Dose Route Frequency  cefepime (MAXIPIME) 1 g in 0.9% sodium chloride (MBP/ADV) 50 mL  1 g IntraVENous Q8H  
 metroNIDAZOLE (FLAGYL) IVPB premix 500 mg  500 mg IntraVENous Q12H  
 heparin (porcine) injection 5,000 Units  5,000 Units SubCUTAneous Q8H  
 LORazepam (ATIVAN) injection 1 mg  1 mg IntraVENous ON CALL  pantoprazole (PROTONIX) tablet 40 mg  40 mg Oral DAILY  furosemide (LASIX) tablet 40 mg  40 mg Oral DAILY  insulin glargine (LANTUS) injection 5 Units  5 Units SubCUTAneous QHS  QUEtiapine (SEROquel) tablet 25 mg  25 mg Oral QHS  amLODIPine (NORVASC) tablet 10 mg  10 mg Oral DAILY  hydrALAZINE (APRESOLINE) 20 mg/mL injection 10 mg  10 mg IntraVENous Q6H PRN  
 haloperidol lactate (HALDOL) injection 2 mg  2 mg IntraMUSCular Q6H PRN  
 sodium chloride (NS) flush 5-40 mL  5-40 mL IntraVENous Q8H  
 sodium chloride (NS) flush 5-40 mL  5-40 mL IntraVENous PRN  
 oxyCODONE IR (ROXICODONE) tablet 2.5 mg  2.5 mg Oral Q4H PRN  
 oxyCODONE IR (ROXICODONE) tablet 5 mg  5 mg Oral Q4H PRN  
 calcium-vitamin D (OS-JERE) 500 mg-200 unit tablet  1 Tab Oral TID WITH MEALS  polyethylene glycol (MIRALAX) packet 17 g  17 g Oral DAILY  bisacodyL (DULCOLAX) suppository 10 mg  10 mg Rectal DAILY PRN  
 aspirin delayed-release tablet 325 mg  325 mg Oral BID  ondansetron (ZOFRAN) injection 4 mg  4 mg IntraVENous Q4H PRN  
 sodium chloride (NS) flush 5-40 mL  5-40 mL IntraVENous Q8H  
 naloxone (NARCAN) injection 0.4 mg  0.4 mg IntraVENous PRN  
 senna-docusate (PERICOLACE) 8.6-50 mg per tablet 2 Tab  2 Tab Oral BID  labetaloL (NORMODYNE;TRANDATE) injection 20 mg  20 mg IntraVENous Q6H PRN  
  metoprolol tartrate (LOPRESSOR) tablet 75 mg  75 mg Oral BID  
 glucose chewable tablet 16 g  4 Tab Oral PRN  
 dextrose (D50W) injection syrg 12.5-25 g  25-50 mL IntraVENous PRN  
 glucagon (GLUCAGEN) injection 1 mg  1 mg IntraMUSCular PRN  
 insulin lispro (HUMALOG) injection   SubCUTAneous AC&HS  acetaminophen (TYLENOL) tablet 650 mg  650 mg Oral Q6H PRN Or  
 acetaminophen (TYLENOL) suppository 650 mg  650 mg Rectal Q6H PRN Objective:  
 
Patient Vitals for the past 8 hrs: 
 BP Temp Pulse Resp SpO2  
05/16/20 1137 162/72 97.6 °F (36.4 °C) (!) 58 18 95 % 05/16/20 0755 185/72 97.8 °F (36.6 °C) 65 16 100 % No intake/output data recorded. 05/14 1901 - 05/16 0700 In: 270 [P.O.:120; I.V.:150] Out: 2300 [XNABZ:9298] MRCP:IMPRESSION:   
  
1. Study compromised by patient motion. 2. No biliary dilatation. 3. Right adrenal adenoma. 
  
 
LABS: 
Recent Labs 05/15/20 
3294 05/14/20 
1846 WBC 12.1* 10.5 HGB 9.2* 8.8* HCT 28.9* 27.2*  
* 739* Recent Labs 05/16/20 
1134 05/15/20 
0418 05/14/20 
5473  141 142  
K 3.9 3.7 4.2 * 110* 113* CO2 28 25 25 BUN 17 25* 27* CREA 0.86 0.93 0.82 * 200* 121* CA 8.3* 8.9 8.8 Recent Labs 05/16/20 
1134 05/15/20 
0418 05/14/20 
9330 SGOT 43* 38* 63* * 569* 622* TBILI 0.3 0.7 0.5 TP 6.1* 6.7 6.6 ALB 2.5* 2.8* 2.7*  
GLOB 3.6 3.9 3.9  
GGT  --   --  412* ALT 74 104* 131* No results for input(s): INR, PTP, APTT, INREXT in the last 72 hours. Recent Labs 05/15/20 
1005 05/15/20 
8745 TIBC  --  256 PSAT  --  21  
FERR 115  -- No results found for: FOL, RBCF Assessment: LFT elevation (Transaminitis)--MRCP shows no biliary ductal dilation Sepsis Jade Madera S/P repair of hip fx 
Gallstones on U/S Plan:  
 Serial LFT's 
Serologies pendind AMA pending

## 2020-05-17 NOTE — PROGRESS NOTES
Bedside and Verbal shift change report given to DAQUAN Gonsales  (oncoming nurse) by Lachelle Major  (offgoing nurse). Report included the following information SBAR, Kardex, Procedure Summary, Intake/Output, MAR, Accordion and Recent Results.

## 2020-05-17 NOTE — PROGRESS NOTES
Marily RN notified tele hospitalist of inability to obtain oral or axillary temperature. Tele hospitalist ordered rectal temp which came back at 94.5. Dr. Carolyn Spencer notified and was made aware. Verbal order given for warming blanket. Warming blanket applied and will recheck temp in 1 hour.

## 2020-05-17 NOTE — PROGRESS NOTES
RAPID RESPONSE TEAM- Follow Up Rounded on patient earlier this morning due to recent rapid response. Patient is in bed, alert, NAD. No RRT interventions indicated at this time. Please call back if needed. Edmar Victor BSN, RN, CFRN, CCRN, ERROL, CPEN, Reena Mehta [address] [facility] Vitals w/ MEWS Score (last day) Date/Time MEWS Score Pulse Resp Temp BP Level of Consciousness SpO2  
 05/17/20 0744  1  64  18  97.7 °F (36.5 °C)  142/65  Alert  98 % 05/17/20 0642  1  (!) 58  17  96.6 °F (35.9 °C)  141/64  Alert  98 % 05/17/20 0532        95 °F (35 °C)        
 05/17/20 0424  3  (!) 53  18  (!) 94.5 °F (34.7 °C)  150/70  Alert  99 % 05/17/20 0346    (!) 55  18    154/78  Alert  99 % 05/17/20 0025          120/72      
 05/16/20 2328  1  (!) 59  16  97.9 °F (36.6 °C)  146/72  Alert  97 % 05/16/20 2258          189/73      
 05/16/20 2215    (!) 57  18    (!) 188/117  Alert  95 % 05/16/20 1950  1  60  18  98.1 °F (36.7 °C)  186/81  Alert  97 % 05/16/20 1526  1  66  18  98 °F (36.7 °C)  169/82  Alert  95 % 05/16/20 1137  1  (!) 58  18  97.6 °F (36.4 °C)  162/72  Alert  95 % 05/16/20 0755  1  65  16  97.8 °F (36.6 °C)  185/72  Alert  100 % 05/16/20 0404  1  60  16  98.1 °F (36.7 °C)  147/66  Alert  100 %

## 2020-05-17 NOTE — PROGRESS NOTES
Orthopedic NP Progress Note Post Op day: 13 Days Post-Op May 17, 2020 12:47 PM  
 
Savannah Wiseman Attending Physician: Treatment Team: Attending Provider: Lily Han MD; Consulting Provider: Carmen Hale NP; Consulting Provider: Ankur Lange MD; Hospitalist: Suraj Nava NP; Consulting Provider: Tavo Chen MD; Utilization Review: Tex Estevez RN; Care Manager: Theron Morris; Utilization Review: Deb Thomas Consulting Provider: Jessica Cornejo MD; Consulting Provider: Dylon Calderon MD  
 
Vital Signs:   
Patient Vitals for the past 8 hrs: 
 BP Temp Pulse Resp SpO2  
05/17/20 1158 132/62 97.3 °F (36.3 °C) 72 18 90 % 05/17/20 0744 142/65 97.7 °F (36.5 °C) 64 18 98 % 05/17/20 0642 141/64 96.6 °F (35.9 °C) (!) 58 17 98 % 05/17/20 0532  95 °F (35 °C)    BMI (calculated): 26.5 (05/15/20 1616) Intake/Output: 
No intake/output data recorded. 05/15 1901 - 05/17 0700 In: -  
Out: 2100 [Urine:2100] Pain Control:  
Pain Assessment Pain Scale 1: Numeric (0 - 10) Pain Intensity 1: 0 Pain Onset 1: post op Pain Location 1: Hip Pain Orientation 1: Right Pain Description 1: Aching Pain Intervention(s) 1: Ice 
 
LAB:   
Recent Labs 05/15/20 
4569 HCT 28.9* HGB 9.2* Lab Results Component Value Date/Time Sodium 137 05/17/2020 04:18 AM  
 Potassium 5.0 05/17/2020 04:18 AM  
 Chloride 106 05/17/2020 04:18 AM  
 CO2 24 05/17/2020 04:18 AM  
 Glucose 204 (H) 05/17/2020 04:18 AM  
 BUN 22 (H) 05/17/2020 04:18 AM  
 Creatinine 0.67 05/17/2020 04:18 AM  
 Calcium 9.6 05/17/2020 04:18 AM  
 
 
Subjective: Savannah Wiseman is a 69 y.o. female s/p a  Procedure(s): RIGHT FEMUR INSERTION INTRA MEDULLARY NAIL Procedure(s): RIGHT FEMUR INSERTION INTRA MEDULLARY NAIL. resting in bed Objective: General:  cooperative, no distress. Neuro/Vascular: CNS Intact. Sensation stable. Brisk cap refill, 2+ pulses UE/LE Musculoskeletal:  FROM UE/LE. Skin: Incision - clean, dry and intact. No significant erythema or swelling. Dressing: clean, dry, and intact 
  
George -y  
  
 
PT/OT:  
Gait:  Gait Base of Support: (deferred -  sidestepped only 2 feet) Speed/Malika: Pace decreased (<100 feet/min), Shuffled, Slow Step Length: Left shortened, Right shortened Stance: Right decreased Gait Abnormalities: Antalgic, Decreased step clearance, Step to gait Ambulation - Level of Assistance: Moderate assistance, Assist x2 Distance (ft): 6 Feet (ft) Assistive Device: Gait belt, Walker, rolling Assessment:  
 s/p Procedure(s): RIGHT FEMUR INSERTION INTRA MEDULLARY NAIL Principal Problem: 
  Closed right hip fracture ([facility] [address] (5/3/2020) Active Problems: 
  Coronary artery disease involving native coronary artery (5/4/2020) Overview: 5/2019 at [facility] cardiac cath with  to mid-distal  
    LAD, med management Diabetes mellitus type 2, controlled ([facility] [address] (5/4/2020) Hypertension (5/4/2020) Dementia ([facility] [address] () Hyperlipemia () Plan:  
-  Continue PT/OT - WBAT  
-  Continue established methods of pain control 
-  VTE Prophylaxes - TEDS &/or SCDs with ASA BID 
-  GI Prophylaxis - protonix  
-  UTI (POA) - ABX completed  
  
  
Discharge To:  Plan to return to [address], cleared for DC from [facility] Signed By: Gregory Cooney NP Orthopedic Nurse Practitioner

## 2020-05-17 NOTE — PROGRESS NOTES
Rapid response was called as patient complained of \"heart hurting\" after she had a large bowel movement. Patient is severely demented on baseline and according to the nursing staff she had a large bowel movement after being constipated for 5 to 6 days in after the bowel movement she was complaining of \"heart hurting. On questioning patient points towards her abdomen. Chest is nontender, abdomen is nontender. No guarding or rigidity noticed. Chest is clear to auscultation. Acute ST segment T wave changes. Give 1 mg of IV morphine for pain. Trend cardiac enzymes

## 2020-05-17 NOTE — PROGRESS NOTES
GI PROGRESS NOTE 
 
NAME:             Fay Duggan :              1950 MRN:              [id number] Admit Date:     2020 Todays Date:  2020 Subjective:  
     
  Denies abdominal pain. Medications-reviewed Current Facility-Administered Medications Medication Dose Route Frequency  morphine 2 mg/mL injection  cefepime (MAXIPIME) 1 g in 0.9% sodium chloride (MBP/ADV) 50 mL  1 g IntraVENous Q8H  
 metroNIDAZOLE (FLAGYL) IVPB premix 500 mg  500 mg IntraVENous Q12H  
 heparin (porcine) injection 5,000 Units  5,000 Units SubCUTAneous Q8H  
 LORazepam (ATIVAN) injection 1 mg  1 mg IntraVENous ON CALL  pantoprazole (PROTONIX) tablet 40 mg  40 mg Oral DAILY  furosemide (LASIX) tablet 40 mg  40 mg Oral DAILY  insulin glargine (LANTUS) injection 5 Units  5 Units SubCUTAneous QHS  QUEtiapine (SEROquel) tablet 25 mg  25 mg Oral QHS  amLODIPine (NORVASC) tablet 10 mg  10 mg Oral DAILY  hydrALAZINE (APRESOLINE) 20 mg/mL injection 10 mg  10 mg IntraVENous Q6H PRN  
 haloperidol lactate (HALDOL) injection 2 mg  2 mg IntraMUSCular Q6H PRN  
 sodium chloride (NS) flush 5-40 mL  5-40 mL IntraVENous Q8H  
 sodium chloride (NS) flush 5-40 mL  5-40 mL IntraVENous PRN  
 oxyCODONE IR (ROXICODONE) tablet 2.5 mg  2.5 mg Oral Q4H PRN  
 oxyCODONE IR (ROXICODONE) tablet 5 mg  5 mg Oral Q4H PRN  
 calcium-vitamin D (OS-JERE) 500 mg-200 unit tablet  1 Tab Oral TID WITH MEALS  polyethylene glycol (MIRALAX) packet 17 g  17 g Oral DAILY  bisacodyL (DULCOLAX) suppository 10 mg  10 mg Rectal DAILY PRN  
 aspirin delayed-release tablet 325 mg  325 mg Oral BID  ondansetron (ZOFRAN) injection 4 mg  4 mg IntraVENous Q4H PRN  
 sodium chloride (NS) flush 5-40 mL  5-40 mL IntraVENous Q8H  
 naloxone (NARCAN) injection 0.4 mg  0.4 mg IntraVENous PRN  
 senna-docusate (PERICOLACE) 8.6-50 mg per tablet 2 Tab  2 Tab Oral BID  
  labetaloL (NORMODYNE;TRANDATE) injection 20 mg  20 mg IntraVENous Q6H PRN  
 metoprolol tartrate (LOPRESSOR) tablet 75 mg  75 mg Oral BID  
 glucose chewable tablet 16 g  4 Tab Oral PRN  
 dextrose (D50W) injection syrg 12.5-25 g  25-50 mL IntraVENous PRN  
 glucagon (GLUCAGEN) injection 1 mg  1 mg IntraMUSCular PRN  
 insulin lispro (HUMALOG) injection   SubCUTAneous AC&HS  acetaminophen (TYLENOL) tablet 650 mg  650 mg Oral Q6H PRN Or  
 acetaminophen (TYLENOL) suppository 650 mg  650 mg Rectal Q6H PRN Objective:  
 
Patient Vitals for the past 8 hrs: 
 BP Temp Pulse Resp SpO2  
05/17/20 0744 142/65 97.7 °F (36.5 °C) 64 18 98 % 05/17/20 0642 141/64 96.6 °F (35.9 °C) (!) 58 17 98 % 05/17/20 0532  95 °F (35 °C)     
05/17/20 0424 150/70 (!) 94.5 °F (34.7 °C) (!) 53 18 99 % 05/17/20 0346 154/78  (!) 55 18 99 % No intake/output data recorded. 05/15 1901 - 05/17 0700 In: -  
Out: 2100 [Urine:2100] MRCP:IMPRESSION:   
  
1. Study compromised by patient motion. 2. No biliary dilatation. 3. Right adrenal adenoma. 
  
 
LABS: 
Recent Labs 05/15/20 
8365 WBC 12.1* HGB 9.2* HCT 28.9*  
* Recent Labs 05/17/20 
5814 05/16/20 
1134 05/15/20 
0418  144 141  
K 5.0 3.9 3.7  110* 110* CO2 24 28 25 BUN 22* 17 25* CREA 0.67 0.86 0.93 * 220* 200* CA 9.6 8.3* 8.9 Recent Labs 05/17/20 
6972 05/16/20 
1134 05/15/20 
0418 SGOT 61* 43* 38* * 466* 569* TBILI 0.5 0.3 0.7 TP 7.7 6.1* 6.7 ALB 3.4* 2.5* 2.8*  
GLOB 4.3* 3.6 3.9 * 74 104* No results for input(s): INR, PTP, APTT, INREXT, INREXT in the last 72 hours. Recent Labs 05/15/20 
1005 05/15/20 
0797 TIBC  --  256 PSAT  --  21  
FERR 115  -- No results found for: FOL, RBCF Assessment: LFT elevation (Transaminitis)--MRCP shows no biliary ductal dilation. Looks like drug reaction to me. Was given ampicillin/Ceftriaxone then Zosyn from 5/11/- 5/14/20. Sepsis Brenda Emanuel S/P repair of hip fx 
Gallstones on U/S---asymptomatic Plan:  
 Serial LFT's 
Serologies pending AMA pending

## 2020-05-17 NOTE — PROGRESS NOTES
Hospitalist Progress Note NAME: Fay Duggan :  1950 MRN:  [id number] Interim Hospital Summary: 69 y.o. female whom presented on 2020 with dementia, DM, HTN, hyperlipidemia, CAD, who presents from a nursing home with right hip pain. She doesn't provide any history. She had multiple falls yesterday and was noted to have a fever and a cough. X-rays were taken there and reportedly showed a right hip fracture. She underwent open reduction and plate fixation. Postoperatively she developed acute metabolic encephalopathy and also hypothermia. Assessment / Plan: 
 
Sepsis 5/10 with hypothermia 94.5, rising WBC count 15.5, normal lactate 1.6 ? HCAP 5/10/2020 Abnormal LFTs with increased alk phos, AST, ALT, GGT Cholelithiasis, ? Choledocholithiasis - Increasing WBC count, cough, hypothermia 5/10 post op - CXR with right effusion, ? Underlying ASD, treated for HCAP with zosyn - Blood and urine cultures negative so far - UA 0-4 WBC, 2+ bacteria 
- since 5/10 worsening LFTs, Alk phos 662, AST 63,  
- US liver with mild intrahepatic ductal dilatation, gallstones CBD 0.4, No wall thickening,dilation, or pericholecystic fluid. 
-  suggests cholestasis 
- raises concern that biliary system may have been cause of the sepsis 
- day 3 zosyn, change to cefepime and flagyl - Differential of the abnormal LFTs Sepsis hepatopathy ? Cholestasis/eevated alk phos due to zosyn ? Choledocholithiasis ? Statin hold lipitor for now - D/W Dr Tess Pfeiffer, they will see in Am 
- try to get MRCP to image ducts better, not sure pt will tolerate D/W daughters, will give ativan(risks/benefits discussed) - check hepatitis panel - MRCP 05/15/2020 1. Study compromised by patient motion. 2. No biliary dilatation. 3. Right adrenal adenoma. - Serial labs Atypical CP 1st degree AV block Hypothermia 
-overnight 
-resolved 
-Trop 0.08-f/o Trop q 6 hr 
 -hold BB and CCB 
-send TSH 
-repeat EKG Right adrenal adenoma. 
-follow as outpatient  
-incidental finding on MRCP Acute metabolic encephalopathy POA,resolving Dementia with delirium 
- Cont measures for prevention of delirium. Open blinds in a.m. - Cont as needed Haldol for agitation.   
- Start Seroquel for now due to continued agitation - Remove George to minimize delirium 
- spoke with daughters by phone, baseline lives at [facility] 
- confused but talkative, daughters note she is not talking as much in hospital  
        baseline; knows herself and place, able to tell daughter's nam 
 
Right femoral intertrochanteric fracture s/p ORIF 
- increasing falls at LTC at [facility] 
-post op care per ortho. 
-On asa for dvt px and now off Plavix per cards recommendations. Per nursing, she is able to take p.o. aspirin Type 2 DM uncontrolled with hypoglycemia 
- A1C 9.0 
-Decrease Lantus to 5 units daily. Hold lispro. Continue sliding scale. - BS yesterday 134, 191, 260, 395 Today 133, 210, 187 
- may need to add back premeal insulin Acute diastolic CHF POA Essential HTN POA Hyperlipidemia POA Persistent A-fib CAD 
-hold Norvasc, metoprolol due to above 
- hold lipitor with abnormal LFTs 
- BNP 4772 and cxr shows edema.   
- Echo shows EF of 65 to 70% with mild mitral valve nonspecific thickening. 
- Continue lasix 
- start albuterol nebs due to congestion and wheezing Lactic acidosis POA Lactate 4.2 resolved with IVF COVID 19 rule  out  
- SARS-CoV-2 negative x 3. Chest x-ray shows bilateral infiltrates. - Patient is on 2 L nasal cannula. - will need negative SARS-CoV-2 Urinary retention 
-Continue bladder scan with straight cath. - S/P George. Enterococcus UTI POA 
-Urine cx growing Enterococcus s/p Ampicillin Hx of CVA 
-Cont asa Overweight POA Body mass index is 26.47 kg/m². Code status: DNR Prophylaxis: SCD's/ Adra Giancarlo 
 
 Recommended Disposition: SNF, pts daughters do not want her go back to [facility] Subjective: Chief Complaint / Reason for Physician Visit f/u \"I am ok, I ate very well\" no complaints per NS, no vomiting Objective: VITALS:  
Last 24hrs VS reviewed since prior progress note. Most recent are: 
Patient Vitals for the past 24 hrs: 
 Temp Pulse Resp BP SpO2  
05/17/20 1158 97.3 °F (36.3 °C) 72 18 132/62 90 % 05/17/20 0744 97.7 °F (36.5 °C) 64 18 142/65 98 % 05/17/20 0642 96.6 °F (35.9 °C) (!) 58 17 141/64 98 % 05/17/20 0532 95 °F (35 °C)      
05/17/20 0424 (!) 94.5 °F (34.7 °C) (!) 53 18 150/70 99 % 05/17/20 0346  (!) 55 18 154/78 99 % 05/17/20 0025    120/72   
05/16/20 2328 97.9 °F (36.6 °C) (!) 59 16 146/72 97 % 05/16/20 2258    189/73   
05/16/20 2215  (!) 57 18 (!) 188/117 95 % 05/16/20 1950 98.1 °F (36.7 °C) 60 18 186/81 97 % 05/16/20 1526 98 °F (36.7 °C) 66 18 169/82 95 % Intake/Output Summary (Last 24 hours) at 5/17/2020 1201 Last data filed at 5/17/2020 2727 Gross per 24 hour Intake  Output 1350 ml Net -1350 ml PHYSICAL EXAM: 
General: Alert and awake, not verbal, not verbal 
EENT:   Anicteric sclerae. MMM Resp:  Rhonchi bilaterally, no wheezing or rales. No accessory muscle use CV:             irregular  rhythm,  No edema GI:  Soft, Non distended, No clear tenderness. +Bowel sounds Neurologic:  Alert and awake, not verbal or consistently following commands Psych:   confused Skin:  Incision is clean and dry Reviewed most current lab test results and cultures  YES Reviewed most current radiology test results   YES Review and summation of old records today    NO Reviewed patient's current orders and MAR    YES 
PMH/SH reviewed - no change compared to H&P 
________________________________________________________________________ Care Plan discussed with: 
  Comments Patient y Family RN y   
Care Manager Consultant Multidiciplinary team rounds were held today with , nursing, pharmacist and clinical coordinator. Patient's plan of care was discussed; medications were reviewed and discharge planning was addressed. ________________________________________________________________________ Jenna Villegas MD  
 
Procedures: see electronic medical records for all procedures/Xrays and details which were not copied into this note but were reviewed prior to creation of Plan. LABS: 
I reviewed today's most current labs and imaging studies. Pertinent labs include: 
Recent Labs 05/15/20 
2840 WBC 12.1* HGB 9.2* HCT 28.9*  
* Recent Labs 05/17/20 
6848 05/16/20 
1134 05/15/20 
0418  144 141  
K 5.0 3.9 3.7  110* 110* CO2 24 28 25 * 220* 200* BUN 22* 17 25* CREA 0.67 0.86 0.93  
CA 9.6 8.3* 8.9 ALB 3.4* 2.5* 2.8* TBILI 0.5 0.3 0.7 SGOT 61* 43* 38* * 74 104* Signed: )Jenna Villegas MD

## 2020-05-17 NOTE — PROGRESS NOTES
2210: Patient increasingly anxious and grimacing. Patient is clutching her abdomen and chest stating that her \"heart hurts\". RRT notified. 2214: Dr. Sudhir Conteh and RR Nurse [address] [facility] at bedside. 2220: EKG completed. STAT troponin drawn. 1 mg IV morphine and 4 mg IV Zofran administered. 2259: Troponin came back 0.08. Patient no longer is complaining of pain although is still awake and restless. BP now 189/73, 10mg IV hydralazine administered. 2328: Patient resting quietly in bed. Will continue to monitor.

## 2020-05-17 NOTE — PROGRESS NOTES
4:20 AM 
Unable to obtain oral or axillary temp. Telehospitalist notified. Primary RN Janette Riley at bedside

## 2020-05-17 NOTE — PROGRESS NOTES
RAPID RESPONSE TEAM 
 
Called to floor by charge DAQUAN Calixto regarding patient c/o \"heart hurting. \" Upon arrival to bedside, patient very restless in bed, c/o pain through out abdomen and chest.  Slightly short of breath, grimacing. Rapid Response Chest Pain called overhead. STAT EKG and STAT cardiac enzymes ordered. Dr. Sandra Montana arrived to bedside, reviewed EKG, no ischemic changes per MD.  Orders received for morphine 1 mg IV once. BP elevated, standing PRN orders of hydralazine, recommended primary DAQUAN Lam give PRN medication if BP remains elevated (SBP>160) post morphine administration. Patient Vitals for the past 12 hrs: 
 Temp Pulse Resp BP SpO2  
05/16/20 2215  (!) 57 18 (!) 188/117 95 % 05/16/20 1950 98.1 °F (36.7 °C) 60 18 186/81 97 % 05/16/20 1526 98 °F (36.7 °C) 66 18 169/82 95 % 05/16/20 1137 97.6 °F (36.4 °C) (!) 58 18 162/72 95 % Patient to remain in room at this time. Please call with any needs or concerns. Max Landin Rapid Response DAQUAN Garcia

## 2020-05-18 NOTE — PROGRESS NOTES
Chart reviewed, pt cleared for participation with therapy by RN. Pt supine in bed, asleep, requiring max verbal and tactile cues for arousal. Pt flickered eyes open briefly however unable to maintain level of arousal necessary for safe participation in therapy. Pt confused and mumbling incoherently, not following commands. Therapy session aborted. Will defer however continue to follow.  
 
Demetrius Wade, PT, DPT

## 2020-05-18 NOTE — PROGRESS NOTES
F/U for elevated liver tests S: Ms. Irlanda Louis was seen by me today during rounds. At this time, she is resting  +comfortably. I cannot get a history from her. She is demented. I spoke to RN and to Dr Breann Wick. The patient has n new complaints today. Please see admission consult for details of ROS; there are no other changes today. O: Blood pressure 144/68, pulse 87, temperature 97.4 °F (36.3 °C), resp. rate 18, height 5' 3\" (1.6 m), weight 67.8 kg (149 lb 6.4 oz), SpO2 98 %. Gen: Patient is in no acute distress. There is no jaundice. Lungs: Clear to auscultation bilaterally . Heart:+RRR. Abd: Soft, non tender, non-distended, bowel sounds present. Extremities: Warm. Cross sectional imaging:  MRI:  5.15  IMPRESSION IMPRESSION:   
  
1. Study compromised by patient motion. 2. No biliary dilatation. 3. Right adrenal adenoma. 
  
 
A: Principal Problem: 
  Closed right hip fracture ([address] (5/3/2020) Active Problems: 
  Coronary artery disease involving native coronary artery (5/4/2020) Overview: 5/2019 at [facility] cardiac cath with  to mid-distal  
    LAD, med management Diabetes mellitus type 2, controlled ([address] (5/4/2020) Hypertension (5/4/2020) Dementia ([address] () Hyperlipemia () Comment:  Liver tests better. Suspect that this is drug P:  Fractionate alk phos 5' nucleotidase Await AMA Will follow John Chaudhari MD 
12:33 PM 
5/18/2020

## 2020-05-18 NOTE — PROGRESS NOTES
Hospitalist Progress Note NAME: Wicho Zarate :  1950 MRN:  [id number] Interim Hospital Summary: 69 y.o. female whom presented on 2020 with dementia, DM, HTN, hyperlipidemia, CAD, who presents from a nursing home with right hip pain. She doesn't provide any history. She had multiple falls yesterday and was noted to have a fever and a cough. X-rays were taken there and reportedly showed a right hip fracture. She underwent open reduction and plate fixation. Postoperatively she developed acute metabolic encephalopathy and also hypothermia. Assessment / Plan: 
 
Sepsis 5/10 with hypothermia 94.5, rising WBC count 15.5, normal lactate 1.6 ? HCAP 5/10/2020 Abnormal LFTs with increased alk phos, AST, ALT, GGT Cholelithiasis, ? Choledocholithiasis - Increasing WBC count, cough, hypothermia 5/10 post op - CXR with right effusion, ? Underlying ASD, treated for HCAP with zosyn - Blood and urine cultures negative so far - UA 0-4 WBC, 2+ bacteria 
- 5/10 worsening LFTs, Alk phos 662, AST 63,  then improving again 
- US liver with mild intrahepatic ductal dilatation, gallstones CBD 0.4, No wall thickening,dilation, or pericholecystic fluid. 
-  suggests cholestasis 
- raises concern that biliary system may have been cause of the sepsis 
- day 3 zosyn, change to cefepime and flagyl and she completed total 7 days. Will dc Abx now - Differential of the abnormal LFTs Sepsis hepatopathy ? Cholestasis/eevated alk phos due to zosyn ? Choledocholithiasis ? Statin hold lipitor for now 
- hepatitis panel negative but still AMA pending - MRCP 05/15/2020 1. Study compromised by patient motion. 2. No biliary dilatation. 3. Right adrenal adenoma. - Serial labs Atypical CP 1st degree AV block Hypothermia 
- 
-resolved 
-Trop 0.08-f/o Trop q 6 hr after that came back to <0.05 
-we held BB and CCB .  Resume BB given significant CAD hx but if persistent bradycardia or A-V block then stop BB 
-TSH and FT4 are compatible with secondary Hypothyroidism 
- will start low dose Synthroid 25 mcg given age and CAD hx 
 
Right adrenal adenoma. 
-follow as outpatient  
-incidental finding on MRCP Acute metabolic encephalopathy POA,resolving Dementia with delirium 
- Cont measures for prevention of delirium. Open blinds in a.m. - Cont as needed Haldol for agitation.   
- Start Seroquel for now due to continued agitation - Remove George to minimize delirium 
- baseline lives at [place] 
- confused but talkative, daughters note she is not talking as much in hospital  
        baseline; knows herself and place, able to tell daughter's nam 
 
Right femoral intertrochanteric fracture s/p ORIF 
- increasing falls at LT at [facility] 
-post op care per ortho. 
-On asa for dvt px and now off Plavix per cards recommendations. she is able to take p.o. aspirin Type 2 DM uncontrolled with hypoglycemia 
- A1C 9.0 
-Decrease Lantus to 5 units daily. Hold lispro. Continue sliding scale. - BS yesterday 134, 191, 260, 395 Today 133, 210, 187 
- may need to add back premeal insulin Acute diastolic CHF POA Essential HTN POA Hyperlipidemia POA Persistent A-fib CAD 
-hold Norvasc, resume metoprolol 
- hold lipitor with abnormal LFTs 
- BNP 4772 and cxr shows edema.   
- Echo shows EF of 65 to 70% with mild mitral valve nonspecific thickening. 
- Continue lasix 
- start albuterol nebs due to congestion and wheezing Lactic acidosis POA Lactate 4.2 resolved with IVF COVID 19 rule  out  
- SARS-CoV-2 negative x 3. Chest x-ray shows bilateral infiltrates. - Patient is on 2 L nasal cannula. - will need negative SARS-CoV-2 Urinary retention 
-Continue bladder scan with straight cath. - S/P George. Enterococcus UTI POA 
-Urine cx growing Enterococcus s/p Ampicillin Hx of CVA 
-Cont asa Overweight POA Body mass index is 26.47 kg/m². Code status: DNR Prophylaxis: SCD's/ Sandy Offer Recommended Disposition: SNF, pts daughters do not want her go back to [place] Subjective: Chief Complaint / Reason for Physician Visit f/u \"I am here\" no complaints per NS, no vomiting Objective: VITALS:  
Last 24hrs VS reviewed since prior progress note. Most recent are: 
Patient Vitals for the past 24 hrs: 
 Temp Pulse Resp BP SpO2  
05/18/20 0734 97.4 °F (36.3 °C) 87 18 144/68 98 % 05/18/20 0415 97.3 °F (36.3 °C) 63 17 144/66 97 % 05/17/20 2345 97.6 °F (36.4 °C) 66 16 177/84 98 % 05/17/20 1955 97.7 °F (36.5 °C) 62 18 152/69 95 % 05/17/20 1600 97.3 °F (36.3 °C) 63 16 127/60 92 % 05/17/20 1158 97.3 °F (36.3 °C) 72 18 132/62 90 % Intake/Output Summary (Last 24 hours) at 5/18/2020 1119 Last data filed at 5/17/2020 1758 Gross per 24 hour Intake  Output 500 ml Net -500 ml PHYSICAL EXAM: 
General: Alert and awake, not verbal, not verbal 
EENT:   Anicteric sclerae. MMM Resp:  Rhonchi bilaterally, no wheezing or rales. No accessory muscle use CV:             irregular  rhythm,  No edema GI:  Soft, Non distended, No clear tenderness. +Bowel sounds Neurologic:  Alert and awake, not verbal or consistently following commands Psych:   confused Skin:  Incision is clean and dry Reviewed most current lab test results and cultures  YES Reviewed most current radiology test results   YES Review and summation of old records today    NO Reviewed patient's current orders and MAR    YES 
PMH/SH reviewed - no change compared to H&P 
________________________________________________________________________ Care Plan discussed with: 
  Comments Patient y Family RN y   
Care Manager Consultant Multidiciplinary team rounds were held today with , nursing, pharmacist and clinical coordinator.   Patient's plan of care was discussed; medications were reviewed and discharge planning was addressed. ________________________________________________________________________ Ildefonso Thayer MD  
 
Procedures: see electronic medical records for all procedures/Xrays and details which were not copied into this note but were reviewed prior to creation of Plan. LABS: 
I reviewed today's most current labs and imaging studies. Pertinent labs include: No results for input(s): WBC, HGB, HCT, PLT, HGBEXT, HCTEXT, PLTEXT, HGBEXT, HCTEXT, PLTEXT in the last 72 hours. Recent Labs 05/18/20 
0006 05/17/20 
0418 05/16/20 
1134  137 144  
K 4.1 5.0 3.9  106 110* CO2 24 24 28 * 204* 220* BUN 27* 22* 17  
CREA 0.83 0.67 0.86 CA 9.1 9.6 8.3* ALB 2.9* 3.4* 2.5* TBILI 0.4 0.5 0.3 SGOT 33 61* 43* ALT 78 106* 74 Signed: )Ildefonso Thayer MD

## 2020-05-18 NOTE — PROGRESS NOTES
Nutrition Assessment: 
 
INTERVENTIONS/RECOMMENDATIONS:  
· Diet progression per SLP 
· Continue glucerna TID · Please document % meals and supplements consumed in flowsheet I/O's under intake · May need increase in lantus d/t consistent BG >180, consider diabetes management consult for recs ASSESSMENT:  
Chart reviewed. Unable to obtain history from pt. Appetite is documented as fair. No recent meal or supplement intake documented in flowsheet. There was an empty glucerna bottle on over-bed table, assuming she is consuming TID (660 kcal and 27 g protein). BG consistently >180, currently getting 5 units of lantus and meal correction. Diet Order: Consistent carb, Puree 
% Eaten:   
Patient Vitals for the past 72 hrs: 
 % Diet Eaten 05/15/20 1730 50 % Pertinent Medications: [x]Reviewed: os-ruperto, lasix, lantus, humalog, PPI, miralax, pericolace, Pertinent Labs: [x]Reviewed: >200 Food Allergies: [x]NKFA  []Other Last BM:  5/16 Edema:  n/a    []RUE   []LUE   []RLE   []LLE Pressure Ulcer:   n/a   [] Stage I   [] Stage II   [] Stage III   [] Stage IV Anthropometrics: Height: 5' 3\" (160 cm) Weight: 67.8 kg (149 lb 6.4 oz) IBW (%IBW):   ( ) UBW (%UBW):   (  %) BMI: Body mass index is 26.47 kg/m². This BMI is indicative of: 
[]Underweight   []Normal   [x]Overweight   [] Obesity   [] Extreme Obesity (BMI>40) Last Weight Metrics: 
Weight Loss Metrics 5/15/2020 Today's Wt 149 lb 6.4 oz BMI 26.47 kg/m2 Estimated Nutrition Needs (Based on): 1425 Kcals/day(BMR: 1100 x 1.3) , 70 g(1.2 g/kg) Protein Carbohydrate: At Least 130 g/day  Fluids: 1425 mL/day or per primary team 
 
NUTRITION DIAGNOSES:  
Problem:  Increased nutrient needs Etiology: related to fracture and post op healing Signs/Symptoms: as evidenced by s/p Intramedullary fixation right hip fracture Previous Nutrition Dx:  [] Resolved  [] Unresolved           [x] Progressing NUTRITION INTERVENTIONS: 
 Meals/Snacks: General/healthful diet   Supplements: Commercial supplement GOAL:  
consume >50% of meals and ONS 
 
NUTRITION MONITORING AND EVALUATION Food/Nutrient Intake Outcomes: Total energy intake Physical Signs/Symptoms Outcomes: Weight/weight change, Electrolyte and renal profile, Glucose profile, GI 
 
Previous Goal Met: 
 [] Met              [x] Progressing Towards Goal              [] Not Progressing Towards Goal  
Previous Recommendations: 
 [x] Implemented          [] Not Implemented          [] Not Applicable LEARNING NEEDS (Diet, Food/Nutrient-Drug Interaction):  
 [x] None Identified 
 [] Identified and Education Provided/Documented 
 [] Identified and Pt declined/was not appropriate Cultural, Anabaptism, OR Ethnic Dietary Needs:  
 [x] None Identified 
 [] Identified and Addressed 
 
 [x] Interdisciplinary Care Plan Reviewed/Documented  
 [x] Discharge Planning: Diet consistency per SLP, Consistent carb restrictions, ONS as needed to meet nutrition needs 
 [] Participated in Interdisciplinary Rounds NUTRITION RISK:  
 [] High              [x] Moderate           []  Low  []  Minimal/Uncompromised Karina Rendon RDN Pager [phone] Weekend Pager [phone]

## 2020-05-18 NOTE — PROGRESS NOTES
Ortho / Neurosurgery NP Note POD# 14  s/p RIGHT FEMUR INSERTION INTRA MEDULLARY NAIL Three COVID test negative. Last test completed on 5/12 Pt resting in bed, eyes open, rolling back in forth in bed. Non-verbal this morning but nods no to pain, CP, SOB Appears tired, received Haldol overnight RRT over weekend for CP - troponin and EKG negative Hx Dementia. Awake, follows simple commands. Roll belt and bed alarm in place. Normothermic. Last hypothermic episode 5/17 Temp 94.5 Remote tele - NSR. HTN - prn hydralazine given overnight. Room air Visit Vitals /68 Pulse 87 Temp 97.4 °F (36.3 °C) Resp 18 Ht 5' 3\" (1.6 m) Wt 67.8 kg (149 lb 6.4 oz) SpO2 98% BMI 26.47 kg/m² Voiding status: George reinserted for retention Output (mL) Urine Voided: 200 ml (05/07/20 2125) Last Bowel Movement Date: 05/16/20 (05/17/20 1955) Unmeasurable Output Urine Occurrence(s): 1 (05/07/20 0845) Stool Occurrence(s): 1 (05/10/20 0207) Straight Cath Straight Cath: Nurse performed cath (05/07/20 0534) Number of Attempts: 1 (05/07/20 0534) Catheter Size: 16 FR (05/07/20 0534) Time Catheter Inserted: 0931 (05/07/20 0534) Time Catheter Removed: 0530 (05/07/20 0534) Urine: 350 mL (05/07/20 0534) Labs Lab Results Component Value Date/Time HGB 9.2 (L) 05/15/2020 04:18 AM  
  
Lab Results Component Value Date/Time INR 1.1 05/03/2020 02:27 AM  
  
Lab Results Component Value Date/Time Sodium 138 05/18/2020 12:06 AM  
 Potassium 4.1 05/18/2020 12:06 AM  
 Chloride 108 05/18/2020 12:06 AM  
 CO2 24 05/18/2020 12:06 AM  
 Glucose 237 (H) 05/18/2020 12:06 AM  
 BUN 27 (H) 05/18/2020 12:06 AM  
 Creatinine 0.83 05/18/2020 12:06 AM  
 Calcium 9.1 05/18/2020 12:06 AM  
 
Recent Glucose Results:  
Lab Results Component Value Date/Time   (H) 05/18/2020 12:06 AM  
 GLUCPOC 217 (H) 05/18/2020 07:31 AM  
 GLUCPOC 248 (H) 05/17/2020 09:27 PM  
 [facility] 233 (H) 05/17/2020 09:13 PM  
 
CXR (5/10) - Unchanged cardiomegaly and edema. Unchanged mild right pleural effusion. New 
mild left pleural effusion. Body mass index is 26.47 kg/m². : A BMI > 30 is classified as obesity and > 40 is classified as morbid obesity. A&O to self only Still with audible upper resp congestions, no apparent work or shortness of breath Lungs clear on auscultation, diminished in bases 3 surgical sites with staples open to air, all well approximated, healing well, c/d/i. Most distal incision missing a staple. Will d/c staples today. Calves soft and supple; No pain with passive stretch Assessment limited due to cognition; moving all extremities. Abd soft, +BS, LBM 5/16 SCDs for mechanical DVT proph while in bed PLAN: 
1) PT BID, OT - WBAT 2) Aspirin 325 mg PO BID for DVT Prophylaxis 3) GI Prophylaxis - Protonix 4) Dementia - fall prevention/safety, bed alarm, roll belt. Haldol PRN. 5) Urinary Retention - UTI POA, ampillicin course completed. George reinserted on 5/8 for retention, pulled out by patient and reinserted on 5/12. 6) Leukocytosis - WBC 12.1 (5/15). Blood and urine cultures sent 5/10, negative. CXR massiel pleural effusions. Zosyn completed for PNA per IM. No other active signs of infection. 7) Readniess for discharge: 
   [x] Vital Signs stable  
 [x] Hgb stable  
 [x] George for retention  
 [x] Wound intact, drainage minimal  
 [x] Diet - Speech following \"Continue Puree diet/ thin liquids. Single straw sips ok STRICT upright PO with any PO!! Feeding asssit - CHECK FOR POCKETING 
     CRUSH MEDS\" [x] Adequate pain control on oral medication alone Plans to return to [address] [place] [address] once medically stable. Stable for discharge from ortho standpoint.   
  
 
Li Leonard NP

## 2020-05-18 NOTE — PROGRESS NOTES
Removed staples from patient's right ip per Nadine Lopez NP. Patient tolerated well. Will continue to monitor

## 2020-05-18 NOTE — PROGRESS NOTES
Bedside and Verbal shift change report given to Chiquis De La O (oncoming nurse) by Michelle Gracia (offgoing nurse). Report included the following information SBAR, Kardex, Intake/Output, MAR and Recent Results.

## 2020-05-18 NOTE — PROGRESS NOTES
RAPID RESPONSE TEAM- Follow Up Rounded on patient due to recent rapid response for chest pain. Spoke with primary RN. No acute concerns at this time. Repeat troponin today 0.05. VSS. MEWS 1. Patient is in bed, NAD. No RRT interventions indicated at this time. Please call back if needed. Elikn Wiseman RN [place] Vitals w/ MEWS Score (last day) Date/Time MEWS Score Pulse Resp Temp BP Level of Consciousness SpO2  
 05/17/20 1955  1  62  18  97.7 °F (36.5 °C)  152/69  Alert  95 % 05/17/20 1600  1  63  16  97.3 °F (36.3 °C)  127/60  Alert  92 % 05/17/20 1158  1  72  18  97.3 °F (36.3 °C)  132/62  Alert  90 % 05/17/20 0744  1  64  18  97.7 °F (36.5 °C)  142/65  Alert  98 % 05/17/20 0642  1  (!) 58  17  96.6 °F (35.9 °C)  141/64  Alert  98 % 05/17/20 0532        95 °F (35 °C)        
 05/17/20 0424  3  (!) 53  18  (!) 94.5 °F (34.7 °C)  150/70  Alert  99 % 05/17/20 0346    (!) 55  18    154/78  Alert  99 % 05/17/20 0025          120/72      
 05/16/20 2328  1  (!) 59  16  97.9 °F (36.6 °C)  146/72  Alert  97 % 05/16/20 2258          189/73      
 05/16/20 2215    (!) 57  18    (!) 188/117  Alert  95 % 05/16/20 1950  1  60  18  98.1 °F (36.7 °C)  186/81  Alert  97 % 05/16/20 1526  1  66  18  98 °F (36.7 °C)  169/82  Alert  95 % 05/16/20 1137  1  (!) 58  18  97.6 °F (36.4 °C)  162/72  Alert  95 % 05/16/20 0755  1  65  16  97.8 °F (36.6 °C)  185/72  Alert  100 % 05/16/20 0404  1  60  16  98.1 °F (36.7 °C)  147/66  Alert  100 %

## 2020-05-19 LAB
ANA SER QL: NEGATIVE
HSV1 DNA SPEC QL NAA+PROBE: NEGATIVE
HSV2 DNA SPEC QL NAA+PROBE: NEGATIVE
MITOCHONDRIA M2 IGG SER-ACNC: <20 UNITS (ref 0–20)
SPECIMEN SOURCE: NORMAL

## 2020-05-19 NOTE — PROGRESS NOTES
Quality: Chart reviewed for death and restraints. Restraints (bilateral soft wrist) noted 5/8/20 - 5/9/20. Restraints not in place at time of death and were not a contributing factor in patient death. Documented for tracking according to CMS guidelines at 5/19- 0624.

## 2020-05-19 NOTE — PROGRESS NOTES
Hospitalist death note Called to see patient for death pronouncement DNR/DNI Developed bradycardia and then asystole Unresponsive to verbal or painful stimuli No respiratory effort or breath sounds No pulse or heart sounds Patient examined at 10:45 pm 
I spoke with daughter Ramy Martinez about death by phone, they will come and see the patient Pk Omalley MD

## 2020-05-19 NOTE — ROUTINE PROCESS
Bedside and Verbal shift change report given to Marily RN (oncoming nurse) by Asher Redmond RN (offgoing nurse). Report included the following information SBAR, Kardex, OR Summary, Procedure Summary, Intake/Output and MAR.

## 2020-05-19 NOTE — PROGRESS NOTES
Responded to page from nursing staff on Ortho to notify patient's death. No family present, will be arriving later. Will follow-up as needed. Rev. [facility] Paging Service: [phone]

## 2020-05-19 NOTE — PROGRESS NOTES
Patient found in bed unresponsive, HR 30/min then become asystole on tele, with low BP, no pulse , shallow breathing. Rapid response called, seen & evaluated by nursing supervisor & Dr Adithya Lacy.

## 2020-05-19 NOTE — PROGRESS NOTES
RAPID RESPONSE TEAM-Follow Up Patient is . No follow up indicated. Gutierrez Rodríguez, RN, BSN, CCRN Rapid Response Marlo Ulloa

## 2020-05-20 LAB — 5NT SERPL-CCNC: 19 IU/L (ref 0–10)

## 2020-05-22 NOTE — DISCHARGE SUMMARY
Hospitalist Discharge Summary Patient ID: Brayan Hilliard 
[id number] 
89 y.o. 
1950 5/2/2020 PCP on record: Richard Alicea MD 
 
Admit date: 5/2/2020 Discharge date and time: 5/21/2020 DISCHARGE DIAGNOSIS: 
 
Sepsis 5/10 with hypothermia 94.5, rising WBC count 15.5, normal lactate 1.6 ? HCAP 5/10/2020 Abnormal LFTs with increased alk phos, AST, ALT, GGT Cholelithiasis, ? Choledocholithiasis Atypical CP Hypothermia Acute metabolic encephalopathy POA,resolving Dementia with delirium Right femoral intertrochanteric fracture s/p ORIF Lactic acidosis POA COVID 19 rule  out Enterococcus UTI POA 
 
CONSULTATIONS: 
IP CONSULT TO ORTHOPEDIC SURGERY 
IP CONSULT TO CARDIOLOGY 
IP CONSULT TO GASTROENTEROLOGY Excerpted HPI from H&P of J Carlos العراقي MD: 
 
HPI: 78 y.o lady with dementia, DM, HTN, hyperlipidemia, CAD, who presents from a nursing home with right hip pain. She doesn't provide any history. She had multiple falls yesterday and was noted to have a fever and a cough. X-rays were taken there and reportedly showed a right hip fracture. ______________________________________________________________________ DISCHARGE SUMMARY/HOSPITAL COURSE:  for full details see H&P, daily progress notes, labs, consult notes. The patient is 69years old woman with past medical history diastolic heart failure, hypertension, coronary artery disease, persistent A. fib presented emergency department with right femoral fracture and she had ORIF by orthopedic surgery. He during her hospitalization developed acute metabolic encephalopathy which was most likely secondary to her underlying dementia versus in hospital delirium which was resolving day by day. On admission COVID-19 test was ordered and it was detected x3.   During her hospitalization patient developed sepsis with hypothermia and she was treated for enterococcus UTI and completed antibiotics she also developed elevated LFTs for which she was evaluated by GI and her elevation of LFTs which most likely secondary to antibiotics which was changed after that. She also had MRCP which revealed incidental right adrenal adenoma but no biliary dilation. Patient developed atypical chest pain during her hospitalization with mild elevation of troponin then her chest pain was resolved and troponin came back to less than 0.05. Patient developed asystole and passed away without resuscitation as per her wishes DNR. 
 
 
_______________________________________________________________________ DISCHARGE MEDICATIONS:  
Discharge Medication List as of 2020  2:12 AM  
  
 
__________________________________________________________________ Disposition 
 
 
____________________________________________________________________ Code Status: DNR/DNI 
___________________________________________________________________ Total time in minutes spent coordinating this discharge (includes going over instructions, follow-up, prescriptions, and preparing report for sign off to her PCP) :  35 minutes Signed: 
John Bender MD .

## 2020-05-23 LAB
EBV DNA SPEC QL NAA+PROBE: POSITIVE
SPECIMEN SOURCE: ABNORMAL

## 2020-05-27 LAB
CMV DNA SERPL NAA+PROBE-ACNC: NORMAL IU/ML
CMV DNA SERPL NAA+PROBE-LOG IU: NORMAL LOG10 IU/ML

## 2021-01-06 NOTE — PROGRESS NOTES
Ortho / Neurosurgery NP Note POD# 2  s/p RIGHT FEMUR INSERTION INTRA MEDULLARY NAIL Patient on isolation for CVOID r/o - COVID test negative x2 RN at bedside. Pt sedated in bed. Bilateral wrist restraints and roll belt in place. Patient given haldol for restlessness and interference with medical care Confused at baseline, AO to self this morning per nursing - currently sedated, unable to answer questions. NPO - currently not able to safely swallow due to sedation Afebrile. 2L NC. Remote tele - NSR on monitor in 90s HTN - given hydralazine and labetalol per IM Visit Vitals BP (!) 178/96 Pulse 89 Temp 98 °F (36.7 °C) Resp 20 Ht 5' 3\" (1.6 m) Wt 64.9 kg (143 lb 1.3 oz) SpO2 98% BMI 25.35 kg/m² Voiding status: incontinent around 1:00 am - straight cath x 1 today Output (mL) Last Bowel Movement Date: (unable to report) (05/06/20 0746) Unmeasurable Output Urine Occurrence(s): 1 (05/06/20 0112) Stool Occurrence(s): 0 (05/06/20 0112) Labs Lab Results Component Value Date/Time HGB 8.0 (L) 05/06/2020 03:25 AM  
  
Lab Results Component Value Date/Time INR 1.1 05/03/2020 02:27 AM  
  
Lab Results Component Value Date/Time Sodium 144 05/06/2020 03:25 AM  
 Potassium 3.6 05/06/2020 03:25 AM  
 Chloride 115 (H) 05/06/2020 03:25 AM  
 CO2 18 (L) 05/06/2020 03:25 AM  
 Glucose 226 (H) 05/06/2020 03:25 AM  
 BUN 26 (H) 05/06/2020 03:25 AM  
 Creatinine 0.75 05/06/2020 03:25 AM  
 Calcium 9.1 05/06/2020 03:25 AM  
 
Recent Glucose Results:  
Lab Results Component Value Date/Time  (H) 05/06/2020 03:25 AM  
 GLUCPOC 267 (H) 05/06/2020 07:42 AM  
 GLUCPOC 201 (H) 05/05/2020 08:16 PM  
 GLUCPOC 229 (H) 05/05/2020 04:12 PM  
 
CXR - bilateral interstitial infiltrates Body mass index is 25.35 kg/m². : A BMI > 30 is classified as obesity and > 40 is classified as morbid obesity. Audible upper airway congestions on entering room. Lungs coarse Pt was advised Opsite dressings c.d.i Calves soft and supple; No pain with passive stretch Assessment limited due to cognition and sedation - moves all extremities SCDs for mechanical DVT proph while in bed PLAN: 
1) PT BID, OT - WBAT - hold until sedation wears off 
2) Aspirin 325 mg PO BID for DVT Prophylaxis - NPO  
3) GI Prophylaxis - Protonix 4) Dementia - fall prevention/safety, bed alarm. Haldol given, roll belt and restraints in place. 5) Readniess for discharge: 
   [x] Vital Signs stable  
 [x] Hgb stable  
 [] + Voiding - straight cath x1 
 [x] Wound intact, drainage minimal  
 [x] Tolerating PO intake   
 [] Cleared by PT (OT if applicable) [] Stair training completed (if applicable) [] Independent / Contact Guard Assist (household distance) [] Bed mobility [] Car transfers  
  [] ADLs [x] Adequate pain control on oral medication alone Plans to return to SNF when medically stable.  
  
 
Cassidy Bey NP

## (undated) DEVICE — REM POLYHESIVE ADULT PATIENT RETURN ELECTRODE: Brand: VALLEYLAB

## (undated) DEVICE — STERILE POLYISOPRENE POWDER-FREE SURGICAL GLOVES: Brand: PROTEXIS

## (undated) DEVICE — SOLUTION IRRIG 1000ML H2O STRL BLT

## (undated) DEVICE — TOWEL SURG W17XL27IN STD BLU COT NONFENESTRATED PREWASHED

## (undated) DEVICE — COVER,TABLE,60X90,STERILE: Brand: MEDLINE

## (undated) DEVICE — KIT,1200CC CANISTER,3/16"X6' TUBING: Brand: MEDLINE INDUSTRIES, INC.

## (undated) DEVICE — SUTURE VCRL SZ 2-0 L36IN ABSRB VLT L36MM CT-1 1/2 CIR J345H

## (undated) DEVICE — INFECTION CONTROL KIT SYS

## (undated) DEVICE — STRAP,POSITIONING,KNEE/BODY,FOAM,4X60": Brand: MEDLINE

## (undated) DEVICE — OPTIFOAM GENTLE SA, POSTOP, 4X8: Brand: MEDLINE

## (undated) DEVICE — BIT DRL L330MM DIA4.2MM CALIB 100MM 3 FLUT QUIK CPL

## (undated) DEVICE — HANDLE LT SNAP ON ULT DURABLE LENS FOR TRUMPF ALC DISPOSABLE

## (undated) DEVICE — 3.2MM GUIDE WIRE 400MM

## (undated) DEVICE — PACK,BASIC,SIRUS,V: Brand: MEDLINE

## (undated) DEVICE — ROCKER SWITCH PENCIL BLADE ELECTRODE, HOLSTER: Brand: EDGE

## (undated) DEVICE — SOLUTION IV 1000ML 0.9% SOD CHL

## (undated) DEVICE — SURGICAL PROCEDURE PACK BASIN MAJ SET CUST NO CAUT

## (undated) DEVICE — GARMENT,MEDLINE,DVT,INT,CALF,MED, GEN2: Brand: MEDLINE

## (undated) DEVICE — Device

## (undated) DEVICE — KIT POS FOAM HANA TBL

## (undated) DEVICE — (D)PREP SKN CHLRAPRP APPL 26ML -- CONVERT TO ITEM 371833

## (undated) DEVICE — 6619 2 PTNT ISO SYS INCISE AREA&LT;(&GT;&&LT;)&GT;P: Brand: STERI-DRAPE™ IOBAN™ 2

## (undated) DEVICE — NEEDLE HYPO 18GA L1.5IN PNK S STL HUB POLYPR SHLD REG BVL

## (undated) DEVICE — SUTURE VCRL SZ 0 L27IN ABSRB VLT L36MM CT-1 1/2 CIR J340H